# Patient Record
Sex: FEMALE | Race: WHITE | NOT HISPANIC OR LATINO | Employment: OTHER | URBAN - METROPOLITAN AREA
[De-identification: names, ages, dates, MRNs, and addresses within clinical notes are randomized per-mention and may not be internally consistent; named-entity substitution may affect disease eponyms.]

---

## 2017-02-12 ENCOUNTER — TELEPHONE (OUTPATIENT)
Dept: URGENT CARE | Facility: URGENT CARE | Age: 72
End: 2017-02-12

## 2017-02-12 DIAGNOSIS — Z12.11 SPECIAL SCREENING FOR MALIGNANT NEOPLASMS, COLON: Primary | ICD-10-CM

## 2017-02-12 NOTE — TELEPHONE ENCOUNTER
Due for mammogram (please help her schedule) and fit test (mail to her.)     I believe we have consent to communicate with Jinny Flores please let her know.     Joel Wegener,MD

## 2017-02-21 ENCOUNTER — CARE COORDINATION (OUTPATIENT)
Dept: GERIATRIC MEDICINE | Facility: CLINIC | Age: 72
End: 2017-02-21

## 2017-02-21 NOTE — PROGRESS NOTES
Dr. Wegener,       KEERTHI:  I just found out today that Ban Petersen was admitted to [facility] on 02/18 with influenza A. I spoke with her nurse and Ban is planning on discharging home later today.     Hillary Romano RN  [facility]   [phone]

## 2017-02-22 ENCOUNTER — CARE COORDINATION (OUTPATIENT)
Dept: GERIATRIC MEDICINE | Facility: CLINIC | Age: 72
End: 2017-02-22

## 2017-02-22 NOTE — PROGRESS NOTES
Client was discharged home from the hospital on 02/21/17 with PCA services. CM called client's brother. They do not have a follow up appointment scheduled.   Hillary Romano RN  [facility]   [phone]

## 2017-02-23 ENCOUNTER — TELEPHONE (OUTPATIENT)
Dept: FAMILY MEDICINE | Facility: CLINIC | Age: 72
End: 2017-02-23

## 2017-02-23 NOTE — TELEPHONE ENCOUNTER
See 2/22/17 care coordination encounter.    Triage to contact patient for hospital follow up.    Writer unable to locate discharge summary from hospital.    Per 10/5/16 telephone encounter, it appears patient is non-verbal.    Called patient's home number and her brother, Pavel, answered phone.    Per Pavel:  1. Patient is doing okay, but they are happy with her progress  2. Some chest congestion  3. When patient's caretaker, Sandra, gets there today, will call clinic to schedule hospital follow up appt    Reviewed clinic address and phone number with Pavel.    Informed Pavel anyone who answers clinic phone can schedule hospital follow up appt for patient.    Pavel verbalized understanding and in agreement with plan.      BONITA TorresN, RN

## 2017-02-25 PROCEDURE — G0180 MD CERTIFICATION HHA PATIENT: HCPCS | Performed by: FAMILY MEDICINE

## 2017-03-02 ENCOUNTER — TELEPHONE (OUTPATIENT)
Dept: FAMILY MEDICINE | Facility: CLINIC | Age: 72
End: 2017-03-02

## 2017-03-02 NOTE — TELEPHONE ENCOUNTER
Reason for Call:  Other FYI    Detailed comments: Toño, from [facility] would like to let Dr. Wegener know that Ban is getting physical therapy done 2 times a week for 2 weeks. If you have any questions please call him at [phone], thank you!      Phone Number Patient can be reached at: Home number on file [phone] (home)    Best Time: Anytime    Can we leave a detailed message on this number? Not Applicable    Call taken on 3/2/2017 at 1:18 PM by Ashely Smith

## 2017-04-03 ENCOUNTER — TELEPHONE (OUTPATIENT)
Dept: FAMILY MEDICINE | Facility: CLINIC | Age: 72
End: 2017-04-03

## 2017-04-03 NOTE — TELEPHONE ENCOUNTER
Reason for Call:  Other Update    Detailed comments: Shital jaquez is Kvng's OT with [facility] and wanted to inform Dr Wegener of a recent fall that Kvng had on 3/31 - indicated that her right knee is quite sore and red and that on a scale from 1-10; she says its at a 2. If Dr wegener has some concerns regarding any future visits or about kvng in general please give Shital a call back.    Phone Number Patient can be reached at: Other phone number:  [phone]    Best Time: anytime    Can we leave a detailed message on this number? YES    Call taken on 4/3/2017 at 10:10 AM by Teja Massey

## 2017-04-03 NOTE — TELEPHONE ENCOUNTER
In general Ban is well cared for by family.  I presume this was just a notification and if Shital Adams feels more evaluation needed would have told us.      Joel Wegener,MD

## 2017-04-03 NOTE — TELEPHONE ENCOUNTER
Dr. Wegener do you have any concerns of her recent fall?...Please address if need any. Thanks.    Mabel Roca MA

## 2017-05-04 ENCOUNTER — CARE COORDINATION (OUTPATIENT)
Dept: GERIATRIC MEDICINE | Facility: CLINIC | Age: 72
End: 2017-05-04

## 2017-05-04 NOTE — PROGRESS NOTES
Dr. Wegener,     I was notified today that Ban was admitted to [facility] on 5/2/17 with UTI. I left a message today with the nurse to notify me with discharge plans.  Hillary Romano RN  [facility]   [phone]

## 2017-05-08 ENCOUNTER — CARE COORDINATION (OUTPATIENT)
Dept: GERIATRIC MEDICINE | Facility: CLINIC | Age: 72
End: 2017-05-08

## 2017-05-08 NOTE — PROGRESS NOTES
Per Agustín, arranged STS thru [facility] (phone: [phone]) for the below appt:  Appt Date: 5/09/17 @ 3:00 pm  Clinic Name:  [facility]   time:  2:00 pm    Notified CM and family of  time.      Caty Whitt  Case Management Specialist  [facility]   [phone]

## 2017-05-08 NOTE — PROGRESS NOTES
CM spoke with client's brother today. He stated client is home from the hospital and is doing fair. CM informed client's brother of the appointment she has to see her PCP on 5/9/17. He was not aware of the appointment. He asked if CM could help arrange transportation for the appointment. CM will have CMS contact transportation company to see if they will be able to transport client.   Hillary Romano RN  [facility]   [phone]

## 2017-05-09 ENCOUNTER — OFFICE VISIT (OUTPATIENT)
Dept: FAMILY MEDICINE | Facility: CLINIC | Age: 72
End: 2017-05-09
Payer: MEDICARE

## 2017-05-09 VITALS
TEMPERATURE: 96.3 F | DIASTOLIC BLOOD PRESSURE: 78 MMHG | SYSTOLIC BLOOD PRESSURE: 130 MMHG | OXYGEN SATURATION: 94 % | HEART RATE: 79 BPM

## 2017-05-09 DIAGNOSIS — M47.816 SPONDYLOSIS OF LUMBAR REGION WITHOUT MYELOPATHY OR RADICULOPATHY: ICD-10-CM

## 2017-05-09 DIAGNOSIS — K04.7 TOOTH ABSCESS: Primary | ICD-10-CM

## 2017-05-09 DIAGNOSIS — F40.298 SPECIFIC PHOBIA: ICD-10-CM

## 2017-05-09 DIAGNOSIS — H35.30 MACULAR DEGENERATION (SENILE) OF RETINA: ICD-10-CM

## 2017-05-09 PROCEDURE — 99495 TRANSJ CARE MGMT MOD F2F 14D: CPT | Performed by: FAMILY MEDICINE

## 2017-05-09 RX ORDER — LORAZEPAM 0.5 MG/1
TABLET ORAL
Qty: 30 TABLET | Refills: 1 | Status: SHIPPED | OUTPATIENT
Start: 2017-05-09 | End: 2017-09-26

## 2017-05-09 RX ORDER — ACETAMINOPHEN AND CODEINE PHOSPHATE 300; 30 MG/1; MG/1
TABLET ORAL
Qty: 150 TABLET | Refills: 5 | Status: SHIPPED | OUTPATIENT
Start: 2017-05-09 | End: 2017-09-26
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
## 2017-05-09 NOTE — NURSING NOTE
"Chief Complaint   Patient presents with     Recheck Medication     Epilepsy     [facility] F/U       Initial /78 (BP Location: Left arm, Patient Position: Chair, Cuff Size: Adult Regular)  Pulse 79  Temp 96.3  F (35.7  C) (Tympanic)  SpO2 94% Estimated body mass index is 26.63 kg/(m^2) as calculated from the following:    Height as of 12/28/12: 5' 5\" (1.651 m).    Weight as of 8/5/15: 160 lb (72.6 kg).     Pt was not weight and height check in, pt was in wheel chair    Medication Reconciliation: complete     Doreen Babb MA    "

## 2017-05-09 NOTE — PROGRESS NOTES
[facility] Six-Month Telephone Assessment    6 month telephone assessment completed on  05/08/2017. CM spoke with client's marilyn Flores for the assessment. She stated was aware of client's appointment with PCP on 5/9 and was planning on going with client.     ER visits: Yes -  [facility]  Hospitalizations: Yes -  [facility]  TCU stays: No  Significant health status changes: Client is a little weaker. She was just discharged from the hospital.   Falls/Injuries: Yes: No injuries  ADL/IADL changes: No  Changes in services: No    Caregiver Assessment follow up:  N/A    Goals: See POC in chart for goal progress documentation.    Hillary Romano RN  [facility]   [phone]      Will see client in 6 months for an annual health risk assessment.   Encouraged client to call CM with any questions or concerns in the meantime.

## 2017-05-26 DIAGNOSIS — Z53.9 DIAGNOSIS NOT YET DEFINED: Primary | ICD-10-CM

## 2017-05-26 PROCEDURE — G0180 MD CERTIFICATION HHA PATIENT: HCPCS | Performed by: FAMILY MEDICINE

## 2017-08-16 DIAGNOSIS — G89.4 CHRONIC PAIN SYNDROME: ICD-10-CM

## 2017-08-16 NOTE — TELEPHONE ENCOUNTER
Medication Detail      Disp Refills Start End RAAD   gabapentin (NEURONTIN) 400 MG capsule 180 capsule 3 6/20/2016  No   Sig: Take 1 capsule (400 mg) by mouth 2 times daily       Last Office Visit with [facility] or [facility] prescribing provider: 5/9/17  Future Office visit:       Routing refill request to provider for review/approval because:  Drug not on the [facility], [facility] or [facility] refill protocol or controlled substance

## 2017-08-16 NOTE — TELEPHONE ENCOUNTER
Problem List Complete:  No     PROVIDER TO CONSIDER COMPLETION OF PROBLEM LIST AND OVERVIEW/CONTROLLED SUBSTANCE AGREEMENT    Controlled substance agreement on file: Yes:  Date 8/6/2015.     Processing:  called, faxed, or e-scribed     checked in past 6 months?  No, route to RN - chronic pain - [place]  review 8/16/2017 12:54 PM - no concerns  Last refill 3/30/2017      Thank you,  Patrizia Chavarria RN

## 2017-08-20 RX ORDER — GABAPENTIN 400 MG/1
CAPSULE ORAL
Qty: 180 CAPSULE | Refills: 3 | Status: SHIPPED | OUTPATIENT
Start: 2017-08-20 | End: 2018-04-10

## 2017-08-22 ENCOUNTER — TELEPHONE (OUTPATIENT)
Dept: FAMILY MEDICINE | Facility: CLINIC | Age: 72
End: 2017-08-22

## 2017-08-22 NOTE — LETTER
[facility]   [address] [place] [address]  [phone]    August 22, 2017      Ban Petersen  [address] [place] [address]              [facility] is committed to making sure our patients get the best care, including preventative care.  Part of that commitment includes making sure you are getting your screening tests, like mammograms, colonoscopy, and pap smears on time.  The [facility]  team has noted that you are currently overdue for your mammogram.      If you think you have already had a mammogram in the past 2 years, please let us know so that we can update our records by giving us information such as when and where you had it done and if the results were normal.  You can send us a message on [facility] or call the clinic at [phone] to give us an update.     If you have not had a mammogram, you can call and schedule one at either:      Central Scheduling [phone]  [facility] offering Sliding-scale or free mammograms for the under/uninsured: [phone]     You may have already discussed this with your provider but [facility] has an additional healthcare team specifically designed to help you remember to complete your regular screening tests.  We appologize in advance for any duplicate messages you may receive; we hope you understand that our primary goal is your health.    Your Healthcare Team

## 2017-09-19 ENCOUNTER — CARE COORDINATION (OUTPATIENT)
Dept: GERIATRIC MEDICINE | Facility: CLINIC | Age: 72
End: 2017-09-19

## 2017-09-19 NOTE — PROGRESS NOTES
Call placed to client to schedule a home visit appointment to complete the annual HRA.  A home visit has been scheduled for 09/27/2017 at 3:00.    Hillary Romano RN  [facility]   [phone]

## 2017-09-26 ENCOUNTER — OFFICE VISIT (OUTPATIENT)
Dept: FAMILY MEDICINE | Facility: CLINIC | Age: 72
End: 2017-09-26
Payer: MEDICARE

## 2017-09-26 VITALS
HEART RATE: 74 BPM | SYSTOLIC BLOOD PRESSURE: 102 MMHG | TEMPERATURE: 95.2 F | RESPIRATION RATE: 14 BRPM | DIASTOLIC BLOOD PRESSURE: 68 MMHG

## 2017-09-26 DIAGNOSIS — Z23 NEED FOR PROPHYLACTIC VACCINATION AND INOCULATION AGAINST INFLUENZA: ICD-10-CM

## 2017-09-26 DIAGNOSIS — Z13.6 CARDIOVASCULAR SCREENING; LDL GOAL LESS THAN 160: ICD-10-CM

## 2017-09-26 DIAGNOSIS — M47.816 SPONDYLOSIS OF LUMBAR REGION WITHOUT MYELOPATHY OR RADICULOPATHY: ICD-10-CM

## 2017-09-26 DIAGNOSIS — G89.4 CHRONIC PAIN SYNDROME: ICD-10-CM

## 2017-09-26 DIAGNOSIS — S06.9X6S: Primary | ICD-10-CM

## 2017-09-26 DIAGNOSIS — R13.12 OROPHARYNGEAL DYSPHAGIA: ICD-10-CM

## 2017-09-26 DIAGNOSIS — F40.298 SPECIFIC PHOBIA: ICD-10-CM

## 2017-09-26 DIAGNOSIS — H35.30 MACULAR DEGENERATION (SENILE) OF RETINA: ICD-10-CM

## 2017-09-26 DIAGNOSIS — G81.90 HEMIPARESIS (H): ICD-10-CM

## 2017-09-26 DIAGNOSIS — K58.2 IRRITABLE BOWEL SYNDROME WITH BOTH CONSTIPATION AND DIARRHEA: ICD-10-CM

## 2017-09-26 DIAGNOSIS — G40.822 NONINTRACTABLE EPILEPTIC SPASMS WITHOUT STATUS EPILEPTICUS (H): ICD-10-CM

## 2017-09-26 DIAGNOSIS — F09 COGNITIVE DYSFUNCTION: ICD-10-CM

## 2017-09-26 DIAGNOSIS — R47.01 APHASIA: ICD-10-CM

## 2017-09-26 LAB
ERYTHROCYTE [DISTWIDTH] IN BLOOD BY AUTOMATED COUNT: 12.7 % (ref 10–15)
HCT VFR BLD AUTO: 41.9 % (ref 35–47)
HGB BLD-MCNC: 13.7 G/DL (ref 11.7–15.7)
MCH RBC QN AUTO: 32.5 PG (ref 26.5–33)
MCHC RBC AUTO-ENTMCNC: 32.7 G/DL (ref 31.5–36.5)
MCV RBC AUTO: 100 FL (ref 78–100)
PLATELET # BLD AUTO: 229 10E9/L (ref 150–450)
RBC # BLD AUTO: 4.21 10E12/L (ref 3.8–5.2)
WBC # BLD AUTO: 6.9 10E9/L (ref 4–11)

## 2017-09-26 PROCEDURE — 85027 COMPLETE CBC AUTOMATED: CPT | Performed by: FAMILY MEDICINE

## 2017-09-26 PROCEDURE — 80185 ASSAY OF PHENYTOIN TOTAL: CPT | Performed by: FAMILY MEDICINE

## 2017-09-26 PROCEDURE — 90662 IIV NO PRSV INCREASED AG IM: CPT | Performed by: FAMILY MEDICINE

## 2017-09-26 PROCEDURE — 80061 LIPID PANEL: CPT | Performed by: FAMILY MEDICINE

## 2017-09-26 PROCEDURE — 99215 OFFICE O/P EST HI 40 MIN: CPT | Mod: 25 | Performed by: FAMILY MEDICINE

## 2017-09-26 PROCEDURE — 80053 COMPREHEN METABOLIC PANEL: CPT | Performed by: FAMILY MEDICINE

## 2017-09-26 PROCEDURE — 36415 COLL VENOUS BLD VENIPUNCTURE: CPT | Performed by: FAMILY MEDICINE

## 2017-09-26 PROCEDURE — G0008 ADMIN INFLUENZA VIRUS VAC: HCPCS | Performed by: FAMILY MEDICINE

## 2017-09-26 RX ORDER — ACETAMINOPHEN AND CODEINE PHOSPHATE 300; 30 MG/1; MG/1
TABLET ORAL
Qty: 150 TABLET | Refills: 5 | Status: SHIPPED | OUTPATIENT
Start: 2017-09-26 | End: 2018-04-10

## 2017-09-26 RX ORDER — LORAZEPAM 0.5 MG/1
TABLET ORAL
Qty: 30 TABLET | Refills: 1 | Status: SHIPPED | OUTPATIENT
Start: 2017-09-26 | End: 2018-04-10

## 2017-09-26 ASSESSMENT — PATIENT HEALTH QUESTIONNAIRE - PHQ9: SUM OF ALL RESPONSES TO PHQ QUESTIONS 1-9: 0

## 2017-09-26 NOTE — LETTER
September 28, 2017      Ban [place]  [address]  [place] [facility] [place] [facility] [address]        Dear ,    We are writing to inform you of your test results.    The phenytoin level was on the low side now so it looks safe to re-start the extra 30mg dose as well.  Please let me know if you need another prescription    Resulted Orders   CBC with platelets   Result Value Ref Range    WBC 6.9 4.0 - 11.0 10e9/L    RBC Count 4.21 3.8 - 5.2 10e12/L    Hemoglobin 13.7 11.7 - 15.7 g/dL    Hematocrit 41.9 35.0 - 47.0 %     78 - 100 fl    MCH 32.5 26.5 - 33.0 pg    MCHC 32.7 31.5 - 36.5 g/dL    RDW 12.7 10.0 - 15.0 %    Platelet Count 229 150 - 450 10e9/L   Phenytoin level   Result Value Ref Range    Phenytoin Level 10.1 10 - 20 mg/L       If you have any questions or concerns, please call the clinic at the number listed above.     Sincerely,    Joel Daniel Wegener, MD/nr

## 2017-09-26 NOTE — NURSING NOTE
Ban Petersen      1.  Has the patient received the information for the influenza vaccine? YES    2.  Does the patient have any of the following contraindications?     Allergy to eggs? No     Allergic reaction to previous influenza vaccines? No     Any other problems to previous influenza vaccines? No     Paralyzed by Guillain-[place] syndrome? No     Currently pregnant? NO     Current moderate or severe illness? No     Allergy to contact lens solution? No    3.  The vaccine has been administered in the usual fashion and the patient was instructed to wait 20 minutes before leaving the building in the event of an allergic reaction: YES    Vaccination given by Doreen Babb MA  .  Recorded by Doreen Babb
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
## 2017-09-26 NOTE — NURSING NOTE
"Chief Complaint   Patient presents with     Recheck Medication     Forms     Annual Comprehensive Nursing Home       Initial /68 (BP Location: Left arm, Patient Position: Chair, Cuff Size: Adult Regular)  Pulse 74  Temp 95.2  F (35.1  C) (Tympanic)  Resp 14 Estimated body mass index is 26.63 kg/(m^2) as calculated from the following:    Height as of 12/28/12: 5' 5\" (1.651 m).    Weight as of 8/5/15: 160 lb (72.6 kg).  Medication Reconciliation: complete Doreen Babb MA      "
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
## 2017-09-26 NOTE — LETTER
September 29, 2017      Ban Petersen  [address]  [facility] [address]        Dear ,    We are writing to inform you of your test results.    The 10-year ASCVD risk score (Jim FRYE Jr, et al., 2013) is: 7.2%    Values used to calculate the score:      Age: 72 years      Sex: Female      Is Non- : No      Diabetic: No      Tobacco smoker: No      Systolic Blood Pressure: 102 mmHg      Is BP treated: No      HDL Cholesterol: 54 mg/dL      Total Cholesterol: 234 mg/dL    Wen's cholesterol score was good (see above).  The liver/kidney labs were normal.    Resulted Orders   Comprehensive metabolic panel   Result Value Ref Range    Sodium 143 133 - 144 mmol/L    Potassium 4.6 3.4 - 5.3 mmol/L    Chloride 110 (H) 94 - 109 mmol/L    Carbon Dioxide 22 20 - 32 mmol/L    Anion Gap 11 3 - 14 mmol/L    Glucose 75 70 - 99 mg/dL      Comment:      Fasting specimen    Urea Nitrogen 18 7 - 30 mg/dL    Creatinine 0.60 0.52 - 1.04 mg/dL    GFR Estimate >90 >60 mL/min/1.7m2      Comment:      Non  GFR Calc    GFR Estimate If Black >90 >60 mL/min/1.7m2      Comment:       GFR Calc    Calcium 9.6 8.5 - 10.1 mg/dL    Bilirubin Total 0.2 0.2 - 1.3 mg/dL    Albumin 3.7 3.4 - 5.0 g/dL    Protein Total 7.3 6.8 - 8.8 g/dL    Alkaline Phosphatase 85 40 - 150 U/L    ALT 19 0 - 50 U/L    AST 17 0 - 45 U/L   Lipid panel reflex to direct LDL   Result Value Ref Range    Cholesterol 234 (H) <200 mg/dL      Comment:      Desirable:       <200 mg/dl    Triglycerides 248 (H) <150 mg/dL      Comment:      Borderline high:  150-199 mg/dl  High:             200-499 mg/dl  Very high:       >499 mg/dl  Fasting specimen      HDL Cholesterol 54 >49 mg/dL    LDL Cholesterol Calculated 130 (H) <100 mg/dL      Comment:      Above desirable:  100-129 mg/dl  Borderline High:  130-159 mg/dL  High:             160-189 mg/dL  Very high:       >189 mg/dl      Non HDL Cholesterol 180 (H)  <130 mg/dL      Comment:      Above Desirable:  130-159 mg/dl  Borderline high:  160-189 mg/dl  High:             190-219 mg/dl  Very high:       >219 mg/dl     CBC with platelets   Result Value Ref Range    WBC 6.9 4.0 - 11.0 10e9/L    RBC Count 4.21 3.8 - 5.2 10e12/L    Hemoglobin 13.7 11.7 - 15.7 g/dL    Hematocrit 41.9 35.0 - 47.0 %     78 - 100 fl    MCH 32.5 26.5 - 33.0 pg    MCHC 32.7 31.5 - 36.5 g/dL    RDW 12.7 10.0 - 15.0 %    Platelet Count 229 150 - 450 10e9/L   Phenytoin level   Result Value Ref Range    Phenytoin Level 10.1 10 - 20 mg/L   If you have any questions or concerns, please call the clinic at the number listed above.   Sincerely,  Joel Daniel Wegener, MD/nr
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
## 2017-09-27 ENCOUNTER — CARE COORDINATION (OUTPATIENT)
Dept: GERIATRIC MEDICINE | Facility: CLINIC | Age: 72
End: 2017-09-27

## 2017-09-27 LAB — PHENYTOIN SERPL-MCNC: 10.1 MG/L (ref 10–20)

## 2017-09-28 ENCOUNTER — CARE COORDINATION (OUTPATIENT)
Dept: GERIATRIC MEDICINE | Facility: CLINIC | Age: 72
End: 2017-09-28

## 2017-09-28 LAB
ALBUMIN SERPL-MCNC: 3.7 G/DL (ref 3.4–5)
ALP SERPL-CCNC: 85 U/L (ref 40–150)
ALT SERPL W P-5'-P-CCNC: 19 U/L (ref 0–50)
ANION GAP SERPL CALCULATED.3IONS-SCNC: 11 MMOL/L (ref 3–14)
AST SERPL W P-5'-P-CCNC: 17 U/L (ref 0–45)
BILIRUB SERPL-MCNC: 0.2 MG/DL (ref 0.2–1.3)
BUN SERPL-MCNC: 18 MG/DL (ref 7–30)
CALCIUM SERPL-MCNC: 9.6 MG/DL (ref 8.5–10.1)
CHLORIDE SERPL-SCNC: 110 MMOL/L (ref 94–109)
CHOLEST SERPL-MCNC: 234 MG/DL
CO2 SERPL-SCNC: 22 MMOL/L (ref 20–32)
CREAT SERPL-MCNC: 0.6 MG/DL (ref 0.52–1.04)
GFR SERPL CREATININE-BSD FRML MDRD: >90 ML/MIN/1.7M2
GLUCOSE SERPL-MCNC: 75 MG/DL (ref 70–99)
HDLC SERPL-MCNC: 54 MG/DL
LDLC SERPL CALC-MCNC: 130 MG/DL
NONHDLC SERPL-MCNC: 180 MG/DL
POTASSIUM SERPL-SCNC: 4.6 MMOL/L (ref 3.4–5.3)
PROT SERPL-MCNC: 7.3 G/DL (ref 6.8–8.8)
SODIUM SERPL-SCNC: 143 MMOL/L (ref 133–144)
TRIGL SERPL-MCNC: 248 MG/DL

## 2017-09-28 NOTE — PROGRESS NOTES
Per CC, notified [employer] to increase monthly pad order.  Melita Lange  Case Management Specialist  [facility]  [phone]

## 2017-09-28 NOTE — PROGRESS NOTES
Per Carli at [facility] member's order hasn't shipped since May.  Jinny has not been approving orders.  CC notified.  Carli will ship the 120 pads authorized.  Melita Lange  Case Management Specialist  [facility]  [phone]

## 2017-10-09 ENCOUNTER — CARE COORDINATION (OUTPATIENT)
Dept: GERIATRIC MEDICINE | Facility: CLINIC | Age: 72
End: 2017-10-09

## 2017-10-09 NOTE — PROGRESS NOTES
Received after visit chart from care coordinator.  Completed following tasks: Mailed copy of care plan to client  Updated services in access  [facility]:  Emailed completed PCA assessment to [facility].  Faxed copy of PCA assessment to [facility] Agency and mailed copy to member.  Faxed MD Communication to PCP.   Chart was returned to CM.   Melita Lange  Case Management Specialist  [facility]  [phone]

## 2017-10-19 ENCOUNTER — TELEPHONE (OUTPATIENT)
Dept: FAMILY MEDICINE | Facility: CLINIC | Age: 72
End: 2017-10-19
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
## 2017-10-19 NOTE — TELEPHONE ENCOUNTER
Letter pended.    Consent to communicate with Sandra on file.    Dr. Wegener-Please review and sign if agree.    Thank you!  BONITA TorresN, RN
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
## 2017-10-20 NOTE — TELEPHONE ENCOUNTER
Lm on  letter caller know letter is done and can be picked up at the  and to make sure has photo ID when picking up

## 2017-11-08 DIAGNOSIS — G40.909 SEIZURE DISORDER (H): ICD-10-CM

## 2017-11-10 RX ORDER — LEVETIRACETAM 750 MG/1
TABLET, FILM COATED, EXTENDED RELEASE ORAL
Qty: 90 TABLET | Refills: 0 | Status: SHIPPED | OUTPATIENT
Start: 2017-11-10 | End: 2018-03-02

## 2017-11-21 ENCOUNTER — CARE COORDINATION (OUTPATIENT)
Dept: GERIATRIC MEDICINE | Facility: CLINIC | Age: 72
End: 2017-11-21

## 2017-11-21 NOTE — PROGRESS NOTES
CM received notification that member had health plan product change from [employer] to [employer] effective 11/01/17.  CM contacted client and her niece Sandra and discussed product change and face to face visit was offered. Client and niece declined need for home visit. CM reviewed Health Risk Assessment/LTCC and POC with client and no changes noted. CMS instructed to enter product change in MMIS.    Follow up Plan: CM to f/u with client at either annual HRA visit and/or 6 month telephone assessment.  Contact information shared with client and family, and encouraged client to call with any questions or concerns.   Hillary Romano RN  [facility]   [phone]

## 2017-11-28 ENCOUNTER — CARE COORDINATION (OUTPATIENT)
Dept: GERIATRIC MEDICINE | Facility: CLINIC | Age: 72
End: 2017-11-28

## 2017-11-28 DIAGNOSIS — G40.909 SEIZURE DISORDER (H): ICD-10-CM

## 2017-11-28 RX ORDER — PHENYTOIN SODIUM 200 MG/1
200 CAPSULE, EXTENDED RELEASE ORAL DAILY
Qty: 90 CAPSULE | Refills: 0 | Status: SHIPPED | OUTPATIENT
Start: 2017-11-28 | End: 2018-03-26

## 2017-11-28 NOTE — PROGRESS NOTES
Per CC request, CMS checked with [employer] on pad order.  Per Yuliet, member hasn't received an order since September.  Yuliet would get an order started and reach out to niece to confirm.  Will contact CC or myself if issue getting in touch with niece.  CC notified.  Melita Lange  Case Management Specialist  [facility]  [phone]

## 2017-11-28 NOTE — TELEPHONE ENCOUNTER
Reason for Call:  Medication or medication refill:    Do you use a [facility]?  Name of the pharmacy and phone number for the current request:  Pharmacy: [facility] [id number] - [address] AT [address] [Patient Preferred]  [phone]    Name of the medication requested: Phenytoin Sodium Extended 200 MG CAPS    Other request: pt's niece is calling to refill this med-has been w/o for over a week. Must have been miscommunication with pharmacy. Can this get filled ASAP?    Can we leave a detailed message on this number? YES    Phone number patient can be reached at: Cell number on file:    Telephone Information:   Mobile [phone]       Best Time: anytime    Call taken on 11/28/2017 at 3:48 PM by Belen Kevin

## 2017-12-12 ENCOUNTER — CARE COORDINATION (OUTPATIENT)
Dept: GERIATRIC MEDICINE | Facility: CLINIC | Age: 72
End: 2017-12-12

## 2017-12-12 NOTE — PROGRESS NOTES
CM received a call from Jeffrey at [facility]  ([phone]) regarding client's bill for charges at [facility]. CM called [facility] and spoke with Neris ([phone]) and Brenda regarding the bill and was told the bill should have been paid because it was covered under client's health plan. They were going to mail out an appeal form to client to fill out so the claim would be filed again. CATRACHITO called client's family and notified them that they should be receiving a letter from [facility] soon regarding this and requested they fill out the form for [facility].   Hillary Romano RN  [facility]   [phone]

## 2018-03-02 DIAGNOSIS — G40.909 SEIZURE DISORDER (H): ICD-10-CM

## 2018-03-02 NOTE — TELEPHONE ENCOUNTER
"Requested Prescriptions   Pending Prescriptions Disp Refills     levETIRAcetam (KEPPRA XR) 750 MG 24 hr tablet [Pharmacy Med Name: LEVETIRACETAM ER 750MG TABLETS]  Last Written Prescription Date:  11/10/2017  Last Fill Quantity: 90 tabs,  # refills: 0   Last office visit: 9/26/2017 with prescribing provider:  wegener   Future Office Visit:     90 tablet 0     Sig: TAKE 1 TABLET BY MOUTH DAILY    Anticonvulsants Protocol  Failed    3/2/2018  1:38 PM       Failed - Review Authorizing provider's last note.     Refer to last progress notes: confirm request is for original authorizing provider (cannot be through other providers).         Passed - No active pregnancy on record       Passed - No positive pregnancy test in last 12 months       Passed - Recent (6 mo) or future (30 days) visit within the authorizing provider's specialty    Patient had office visit in the last 6 months or has a visit in the next 30 days with authorizing provider.  See \"Patient Info\" tab in inbasket, or \"Choose Columns\" in Meds & Orders section of the refill encounter.              "

## 2018-03-02 NOTE — TELEPHONE ENCOUNTER
Level not done for keppra.-future lab still in chart  Last office visit 9/17  Please advise based on last not if ok to fill.     Thanks!     Miriam Hou RN

## 2018-03-05 RX ORDER — LEVETIRACETAM 750 MG/1
TABLET, FILM COATED, EXTENDED RELEASE ORAL
Qty: 90 TABLET | Refills: 1 | Status: SHIPPED | OUTPATIENT
Start: 2018-03-05 | End: 2019-09-03

## 2018-03-13 ENCOUNTER — CARE COORDINATION (OUTPATIENT)
Dept: GERIATRIC MEDICINE | Facility: CLINIC | Age: 73
End: 2018-03-13

## 2018-03-13 NOTE — TELEPHONE ENCOUNTER
Spoke to male person at patients home number and he said that we need to give information to person that takes care of all of this so he said to call back after 3:30 to speak with Sandra Rivero she is only there on Tuesday's, Wednesday's, and Thursday's after 3:30 and to let her know about medication being filled and that patient needs to come in and have labs done to have keppra level checked

## 2018-03-14 ENCOUNTER — TELEPHONE (OUTPATIENT)
Dept: FAMILY MEDICINE | Facility: CLINIC | Age: 73
End: 2018-03-14

## 2018-03-14 NOTE — PROGRESS NOTES
[facility] Six-Month Telephone Assessment    6 month telephone assessment completed on 03/13/18. CM spoke with client's marilyn Flores.    ER visits: No  Hospitalizations: No  TCU stays: No  Significant health status changes: Client is having more problems with incontinence. She needs larger pads. Sandra is working with [employer] to get these.   Falls/Injuries: No  ADL/IADL changes: No  Changes in services: No    Caregiver Assessment follow up:  N/A    Goals: See POC in chart for goal progress documentation.  Sandra states client was measured for a wheelchair by [employer] almost a year ago and they still haven't received the wheelchair. She states they are working on getting paperwork signed by PCP. CM called [employer] today and left a message with Vazquez at ext [phone] requesting a call back regarding this and to see if CM could assist with getting the wheelchair for client.     Will see member in 6 months for an annual health risk assessment.   Encouraged member to call CC with any questions or concerns in the meantime.   Hillary Romano RN  [facility]   [phone]

## 2018-03-14 NOTE — TELEPHONE ENCOUNTER
Reason for Call:  Other- new wheel chair from [facility] & changed order for bladder control supplies from [employer]    Detailed comments: Sandra states that they have been trying to get a new wheelchair for pt since last OV (4-5 months ago). [facility] has faxed over forms for Dr. Wegener to fill out but has not heard back. Please follow up. Thank you!    [facility] will be faxing orders for bladder control supplies also.     Phone Number Patient can be reached at: Home number on file [phone] (home)    Best Time: anytime    Can we leave a detailed message on this number? Not Applicable    Call taken on 3/14/2018 at 1:07 PM by Ashely Smith

## 2018-03-26 DIAGNOSIS — G40.909 SEIZURE DISORDER (H): ICD-10-CM

## 2018-03-26 DIAGNOSIS — Z51.81 MEDICATION MONITORING ENCOUNTER: Primary | ICD-10-CM

## 2018-03-26 NOTE — TELEPHONE ENCOUNTER
Reason for Call:  Medication or medication refill:    Do you use a [facility]?  Name of the pharmacy and phone number for the current request:  [facility] on [address] - [phone]    Name of the medication requested: Phenytoin Sodium Extended 200 MG CAPS    Other request: Pt will be out by this Friday.    Can we leave a detailed message on this number? YES    Phone number patient can be reached at: Cell number on file:    Telephone Information:   Mobile [phone]       Best Time: Anytime    Call taken on 3/26/2018 at 1:06 PM by Brittany oByd

## 2018-03-26 NOTE — TELEPHONE ENCOUNTER
"Requested Prescriptions   Pending Prescriptions Disp Refills     Phenytoin Sodium Extended 200 MG CAPS  Last Written Prescription Date:  11/28/2017  Last Fill Quantity: 90 capsule,  # refills: 0   Last Office Visit: 9/26/2017   Future Office Visit:    Next 5 appointments (look out 90 days)     Apr 10, 2018  3:20 PM CDT   Office Visit with Joel Daniel Irwin Wegener, MD   [facility] ([facility])    [address] [place] [id number] [phone]                90 capsule 0     Sig: Take 200 mg by mouth daily    Anticonvulsants Protocol  Failed    3/26/2018  1:17 PM       Failed - Dilantin level within therapeutic range in past 6 months    Recent Labs   Lab Test  09/26/17   1603   DILANTIN  10.1       Dilantin level must be checked 2-4 weeks after dosage change.         Failed - Review Authorizing provider's last note.     Refer to last progress notes: confirm request is for original authorizing provider (cannot be through other providers).       Failed - Normal ALT on file in past 6 months    Recent Labs   Lab Test  09/26/17   1603   ALT  19          Failed - Normal CBC on file in past 6 months    Recent Labs   Lab Test  09/26/17   1603   WBC  6.9   RBC  4.21   HGB  13.7   HCT  41.9   PLT  229          Passed - No active pregnancy on record       Passed - No positive pregnancy test in last 12 months       Passed - Recent (6 mo) or future (30 days) visit within the authorizing provider's specialty    Patient had office visit in the last 6 months or has a visit in the next 30 days with authorizing provider or within the authorizing provider's specialty.  See \"Patient Info\" tab in inbasket, or \"Choose Columns\" in Meds & Orders section of the refill encounter.              "

## 2018-03-29 RX ORDER — PHENYTOIN SODIUM 200 MG/1
200 CAPSULE, EXTENDED RELEASE ORAL DAILY
Qty: 90 CAPSULE | Refills: 0 | Status: SHIPPED | OUTPATIENT
Start: 2018-03-29 | End: 2018-04-10

## 2018-03-29 NOTE — TELEPHONE ENCOUNTER
Routing refill request to provider for review/approval because:  Labs not current:  CBC, ALT and Keppra level    -Writer notes Keppra lab already ordered in chart.  CBC and ALT also pended.    Dr. Wegener-Please sign if agree.    Thank you!  MARAL Sanderson, BSN, RN

## 2018-04-10 ENCOUNTER — OFFICE VISIT (OUTPATIENT)
Dept: FAMILY MEDICINE | Facility: CLINIC | Age: 73
End: 2018-04-10
Payer: COMMERCIAL

## 2018-04-10 VITALS
TEMPERATURE: 97.9 F | DIASTOLIC BLOOD PRESSURE: 72 MMHG | OXYGEN SATURATION: 99 % | SYSTOLIC BLOOD PRESSURE: 120 MMHG | HEART RATE: 69 BPM

## 2018-04-10 DIAGNOSIS — Z23 NEED FOR VACCINATION: ICD-10-CM

## 2018-04-10 DIAGNOSIS — F40.298 SPECIFIC PHOBIA: ICD-10-CM

## 2018-04-10 DIAGNOSIS — G40.909 SEIZURE DISORDER (H): Primary | ICD-10-CM

## 2018-04-10 DIAGNOSIS — M47.816 SPONDYLOSIS OF LUMBAR REGION WITHOUT MYELOPATHY OR RADICULOPATHY: ICD-10-CM

## 2018-04-10 DIAGNOSIS — H35.30 MACULAR DEGENERATION (SENILE) OF RETINA: ICD-10-CM

## 2018-04-10 DIAGNOSIS — G89.4 CHRONIC PAIN SYNDROME: ICD-10-CM

## 2018-04-10 LAB
ERYTHROCYTE [DISTWIDTH] IN BLOOD BY AUTOMATED COUNT: 12.6 % (ref 10–15)
HCT VFR BLD AUTO: 42.6 % (ref 35–47)
HGB BLD-MCNC: 13.8 G/DL (ref 11.7–15.7)
MCH RBC QN AUTO: 32.5 PG (ref 26.5–33)
MCHC RBC AUTO-ENTMCNC: 32.4 G/DL (ref 31.5–36.5)
MCV RBC AUTO: 100 FL (ref 78–100)
PLATELET # BLD AUTO: 210 10E9/L (ref 150–450)
RBC # BLD AUTO: 4.25 10E12/L (ref 3.8–5.2)
WBC # BLD AUTO: 6.1 10E9/L (ref 4–11)

## 2018-04-10 PROCEDURE — 90670 PCV13 VACCINE IM: CPT | Performed by: FAMILY MEDICINE

## 2018-04-10 PROCEDURE — 99000 SPECIMEN HANDLING OFFICE-LAB: CPT | Performed by: FAMILY MEDICINE

## 2018-04-10 PROCEDURE — 80185 ASSAY OF PHENYTOIN TOTAL: CPT | Performed by: FAMILY MEDICINE

## 2018-04-10 PROCEDURE — 36415 COLL VENOUS BLD VENIPUNCTURE: CPT | Performed by: FAMILY MEDICINE

## 2018-04-10 PROCEDURE — 90715 TDAP VACCINE 7 YRS/> IM: CPT | Performed by: FAMILY MEDICINE

## 2018-04-10 PROCEDURE — 80177 DRUG SCRN QUAN LEVETIRACETAM: CPT | Mod: 90 | Performed by: FAMILY MEDICINE

## 2018-04-10 PROCEDURE — 80053 COMPREHEN METABOLIC PANEL: CPT | Performed by: FAMILY MEDICINE

## 2018-04-10 PROCEDURE — G0009 ADMIN PNEUMOCOCCAL VACCINE: HCPCS | Mod: 59 | Performed by: FAMILY MEDICINE

## 2018-04-10 PROCEDURE — 99214 OFFICE O/P EST MOD 30 MIN: CPT | Mod: 25 | Performed by: FAMILY MEDICINE

## 2018-04-10 PROCEDURE — 85027 COMPLETE CBC AUTOMATED: CPT | Performed by: FAMILY MEDICINE

## 2018-04-10 PROCEDURE — 90471 IMMUNIZATION ADMIN: CPT | Performed by: FAMILY MEDICINE

## 2018-04-10 RX ORDER — LORAZEPAM 0.5 MG/1
TABLET ORAL
Qty: 30 TABLET | Refills: 1 | Status: SHIPPED | OUTPATIENT
Start: 2018-04-10 | End: 2018-10-09

## 2018-04-10 RX ORDER — GABAPENTIN 400 MG/1
CAPSULE ORAL
Qty: 180 CAPSULE | Refills: 3 | Status: SHIPPED | OUTPATIENT
Start: 2018-04-10 | End: 2019-05-08

## 2018-04-10 RX ORDER — ACETAMINOPHEN AND CODEINE PHOSPHATE 300; 30 MG/1; MG/1
TABLET ORAL
Qty: 150 TABLET | Refills: 5 | Status: SHIPPED | OUTPATIENT
Start: 2018-04-10 | End: 2018-06-04

## 2018-04-10 RX ORDER — PHENYTOIN SODIUM 200 MG/1
200 CAPSULE, EXTENDED RELEASE ORAL DAILY
Qty: 90 CAPSULE | Refills: 3 | Status: SHIPPED | OUTPATIENT
Start: 2018-04-10 | End: 2019-10-03

## 2018-04-10 ASSESSMENT — ANXIETY QUESTIONNAIRES
7. FEELING AFRAID AS IF SOMETHING AWFUL MIGHT HAPPEN: NOT AT ALL
IF YOU CHECKED OFF ANY PROBLEMS ON THIS QUESTIONNAIRE, HOW DIFFICULT HAVE THESE PROBLEMS MADE IT FOR YOU TO DO YOUR WORK, TAKE CARE OF THINGS AT HOME, OR GET ALONG WITH OTHER PEOPLE: NOT DIFFICULT AT ALL
1. FEELING NERVOUS, ANXIOUS, OR ON EDGE: NOT AT ALL
3. WORRYING TOO MUCH ABOUT DIFFERENT THINGS: NOT AT ALL
2. NOT BEING ABLE TO STOP OR CONTROL WORRYING: NOT AT ALL
6. BECOMING EASILY ANNOYED OR IRRITABLE: NOT AT ALL

## 2018-04-10 ASSESSMENT — PATIENT HEALTH QUESTIONNAIRE - PHQ9: 5. POOR APPETITE OR OVEREATING: NOT AT ALL

## 2018-04-10 NOTE — LETTER
April 16, 2018      Ban Petersen  [address]  [facility] [place] [facility] [address]        Dear ,    We are writing to inform you of your test results.    Results stable/normal, no further med changes needed.    Resulted Orders   Comprehensive metabolic panel   Result Value Ref Range    Sodium 143 133 - 144 mmol/L    Potassium 4.9 3.4 - 5.3 mmol/L    Chloride 112 (H) 94 - 109 mmol/L    Carbon Dioxide 22 20 - 32 mmol/L    Anion Gap 9 3 - 14 mmol/L    Glucose 84 70 - 99 mg/dL      Comment:      Non Fasting    Urea Nitrogen 19 7 - 30 mg/dL    Creatinine 0.71 0.52 - 1.04 mg/dL    GFR Estimate 81 >60 mL/min/1.7m2      Comment:      Non  GFR Calc    GFR Estimate If Black >90 >60 mL/min/1.7m2      Comment:       GFR Calc    Calcium 9.7 8.5 - 10.1 mg/dL    Bilirubin Total 0.2 0.2 - 1.3 mg/dL    Albumin 3.8 3.4 - 5.0 g/dL    Protein Total 7.5 6.8 - 8.8 g/dL    Alkaline Phosphatase 83 40 - 150 U/L    ALT 12 0 - 50 U/L    AST 15 0 - 45 U/L   CBC with platelets   Result Value Ref Range    WBC 6.1 4.0 - 11.0 10e9/L    RBC Count 4.25 3.8 - 5.2 10e12/L    Hemoglobin 13.8 11.7 - 15.7 g/dL    Hematocrit 42.6 35.0 - 47.0 %     78 - 100 fl    MCH 32.5 26.5 - 33.0 pg    MCHC 32.4 31.5 - 36.5 g/dL    RDW 12.6 10.0 - 15.0 %    Platelet Count 210 150 - 450 10e9/L   Phenytoin level   Result Value Ref Range    Phenytoin Level 10.4 10 - 20 mg/L   Keppra (Levetiracetam) Level   Result Value Ref Range    Keppra (Levetiracetam) Level 12 12 - 46 ug/mL      Comment:      (Note)  INTERPRETIVE INFORMATION: Keppra (Levetiracetam)  Therapeutic Range:  12-46 ug/mL             Toxic:  Not well Established  Pharmacokinetics of levetiracetam are affected by renal   function. Adverse effects may include somnolence, weakness,   headache and vomiting.  Performed by "[facility]  [address] [place] [address] [phone]  [email or web address], Ernesto Mehta MD, Lab. Director         If you have any questions  or concerns, please call the clinic at the number listed above.       Sincerely,        Joel Daniel Wegener, MD/nr

## 2018-04-10 NOTE — PROGRESS NOTES
SUBJECTIVE:   Ban Petersen is a 73 year old female who presents to clinic today for the following health issues:      Patient here to discuss medication and refills.           Seizure disorder (H)  Macular degeneration (senile) of retina  Specific phobia  Chronic pain syndrome  Spondylosis of lumbar region without myelopathy or radiculopathy  Need for vaccination :living with niece here with her today.  Reviewed status at last note, no change.  No major health events since last visit.  No seizures.  Continues to use low dose pain medications supervised by niece.  Overall she reports herself as feeling happy.  Has all needs cared for.      Continue to not want to do preventive cancer screening given other health issues/age.         Problem list, Medication list, Allergies, and Medical/Social/Surgical histories reviewed in [facility] and updated as appropriate.  Labs reviewed in [facility]  BP Readings from Last 3 Encounters:   04/10/18 120/72   09/26/17 102/68   05/09/17 130/78    Wt Readings from Last 3 Encounters:   08/05/15 160 lb (72.6 kg)   01/30/13 139 lb (63 kg)   12/28/12 145 lb (65.8 kg)                  Patient Active Problem List   Diagnosis     Epilepsy (H)     Flaccid hemiplegia (H)     Family history of osteoporosis     Hemiparesis (H)     IBS (irritable bowel syndrome)     CARDIOVASCULAR SCREENING; LDL GOAL LESS THAN 160     Abdominal pain, unspecified abdominal location     Cholelithiasis with obstruction     Health Care Home     Advanced directives, counseling/discussion     Hip pain     Physical deconditioning     Seizure disorder (H)     Spondylosis of lumbar region without myelopathy or radiculopathy     Chronic pain syndrome     Brain injury, with LOC > 24 hr without return to prior conscious level, patient surviving, sequela     Oropharyngeal dysphagia     Cognitive dysfunction     Aphasia     Past Surgical History:   Procedure Laterality Date     C NONSPECIFIC PROCEDURE      Cholecystectomy      CHOLECYSTECTOMY       ENDOSCOPIC RETROGRADE CHOLANGIOPANCREATOGRAM WITH DIRECT VISUALIZATION SYSTEM AND GENERATOR  5/2/2011    Procedure:ENDOSCOPIC RETROGRADE CHOLANGIOPANCREATOGRAM WITH DIRECT VISUALIZATION SYSTEM AND GENERATOR; with Spyglass, Exchange of Bile Duct Stent, Removal of Bile Duct Stone ; Surgeon:PAIGE MAHER; Location:[facility] OR     ENDOSCOPIC RETROGRADE CHOLANGIOPANCREATOGRAM WITH SPYGLASS  4/1/2011    Procedure:ENDOSCOPIC RETROGRADE CHOLANGIOPANCREATOGRAM WITH SPYGLASS; Biliary Spincterotomy, Endo retrograde insertion of stent into bile duct, Endo retrograde balloon dilation of bilary ducts, Electryhydraulic Lithotripsy; Surgeon:PAIGE MAHER; Location:[facility] OR     ESOPHAGOSCOPY, GASTROSCOPY, DUODENOSCOPY (EGD), COMBINED  6/2/2011    Procedure:COMBINED ESOPHAGOSCOPY, GASTROSCOPY, DUODENOSCOPY (EGD), REMOVE FOREIGN BODY; Surgeon:PAIGE MAHER; Location:[facility] GI     gall stone  5/2011    only took out gallstone, not gallbladder       Social History   Substance Use Topics     Smoking status: Former Smoker     Packs/day: 1.50     Years: 19.00     Types: Cigarettes     Quit date: 10/22/1985     Smokeless tobacco: Never Used     Alcohol use No     No family history on file.      Current Outpatient Prescriptions   Medication Sig Dispense Refill     LORazepam (ATIVAN) 0.5 MG tablet Take 30 minutes prior to eye visits with injections as needed, may repeat in 1-4 hours as needed. 30 tablet 1     gabapentin (NEURONTIN) 400 MG capsule TAKE 1 CAPSULE(400 MG) BY MOUTH TWICE DAILY 180 capsule 3     Phenytoin Sodium Extended 200 MG CAPS Take 200 mg by mouth daily 90 capsule 3     acetaminophen-codeine (TYLENOL WITH CODEINE #3) 300-30 MG per tablet Two tablet in morning and two in evening and one other at noon as needed, #150 for one month supply. See dr. Wegener in person for refills. 150 tablet 5     levETIRAcetam (KEPPRA XR) 750 MG 24 hr tablet TAKE 1 TABLET BY MOUTH DAILY 90 tablet 1     calcium carb 1250 mg, 500 mg  [place],/vitamin D 200 units (OSCAL WITH D) 500-200 MG-UNIT per tablet Take 2 tablets by mouth daily 180 tablet 3     senna-docusate (SENNA PLUS) 8.6-50 MG per tablet 1 TABLET  AT BEDTIME 90 tablet 1     multivitamin, therapeutic with minerals (MULTI-VITAMIN) TABS Take 1 tablet by mouth 2 times daily Preservision AREDS       aspirin 81 MG EC tablet Take 1 tablet (81 mg) by mouth daily 90 tablet 3     ORDER FOR DME One pair of knee high compression stockings of medium compression.  Mid-calf circumference is 30cm. 1 each 0     Pediatric Multivitamins-Fl (MULTI VIT/FL PO) Take  by mouth.       Allergies   Allergen Reactions     No Known Drug Allergies      Recent Labs   Lab Test  04/10/18   1647  09/26/17   1603  09/29/16   1620  08/05/15   1610   11/03/12   0633   03/30/11   0648  03/29/11   1514   A1C   --    --    --    --    --   5.0   --    --    --    LDL   --   130*  128*  120   --   100   --    --    --    HDL   --   54  59  54   --   55   --    --    --    TRIG   --   248*  165*  163*   --   174*   --    --    --    ALT  12  19  15  20   < >   --    < >  319*  447*   CR  0.71  0.60  0.57  0.69   < >   --    < >  0.62  0.67   GFRESTIMATED  81  >90  >90  Non African American GFR Calc    84   < >   --    < >  >90  88   GFRESTBLACK  >90  >90  >90  African American GFR Calc    >90   GFR Calc     < >   --    < >  >90  >90   POTASSIUM  4.9  4.6  4.5  4.3   < >   --    < >  4.0  4.3   TSH   --    --    --    --    --    --    --   0.77  1.09    < > = values in this interval not displayed.        ROS:  Constitutional, HEENT, cardiovascular, pulmonary, GI, , musculoskeletal, neuro, skin, endocrine and psych systems are negative, except as otherwise noted.        OBJECTIVE:  /72  Pulse 69  Temp 97.9  F (36.6  C) (Oral)  SpO2 99%    EXAM:  GENERAL APPEARANCE: healthy, alert and no distress  In wheelchair today.   Can stand with assistance but cannot walk or transfer on her own.  Cannot push the  wheelchair due to low arm strength.   Slightly dysarthric speech, unchanged from past.   RESP: lungs clear to auscultation - no rales, rhonchi or wheezes  CV: regular rates and rhythm, normal S1 S2, no S3 or S4 and no murmur, click or rub -  ABDOMEN:  soft, nontender, no HSM or masses and bowel sounds normal      ASSESSMENT AND PLAN  Patient Instructions   ASSESSMENT AND PLAN  1. Seizure disorder (H)  No big changes since last visit.  Living with daughter.  No recent seizures.  Check levels and toxicity mponitoring labs today.   - Phenytoin Sodium Extended 200 MG CAPS; Take 200 mg by mouth daily  Dispense: 90 capsule; Refill: 3  - Comprehensive metabolic panel  - CBC with platelets  - Phenytoin level  - Keppra (Levetiracetam) Level    2. Macular degeneration (senile) of retina  Continues frequent treatments needing anti-anxiety med for procedures.   - LORazepam (ATIVAN) 0.5 MG tablet; Take 30 minutes prior to eye visits with injections as needed, may repeat in 1-4 hours as needed.  Dispense: 30 tablet; Refill: 1    3. Specific phobia    - LORazepam (ATIVAN) 0.5 MG tablet; Take 30 minutes prior to eye visits with injections as needed, may repeat in 1-4 hours as needed.  Dispense: 30 tablet; Refill: 1    4. Chronic pain syndrome  Refilled.   - gabapentin (NEURONTIN) 400 MG capsule; TAKE 1 CAPSULE(400 MG) BY MOUTH TWICE DAILY  Dispense: 180 capsule; Refill: 3    5. Spondylosis of lumbar region without myelopathy or radiculopathy  Refilled chronic contract pain medications, signed contract.  Supervised by family.     - acetaminophen-codeine (TYLENOL WITH CODEINE #3) 300-30 MG per tablet; Two tablet in morning and two in evening and one other at noon as needed, #150 for one month supply. See dr. Wegener in person for refills.  Dispense: 150 tablet; Refill: 5    6.  Immunizations:  tdap and pcv 13 today, will wait on new shingles vaccine.     7. Confirmed again with pt and family continues to no longer desire preventive  "cancer screening.         MYCHART FOR ON-LINE CARE(VISITS), LABS, REFILLS, MESSAGING, ETC [email or web address] [phone]    E-VISIT: click \"on-line care, then request e-visit\".  E-visits work well for following up on issues we have discussed in clinic previously which may need new prescriptions, new prescriptions or substantial discussion. These are always done by me (Dr. Wegener).     ONCARE VISIT:  [email or web address]  - we treat nearly 50 common conditions through on-care.  These are done in an hour by on-call staff.     RADIOLOGY:  [facility]:  [phone]   [facility]: [phone]    Mammogram and Colonoscopy [phone]    Smoking Cessation: [email or web address], 8-170-887-PLAN ([phone])      CONSUMER PRICE LINE for estimates of test costs:  [phone]       Joel Wegener,MD   and edited the medical student s documentation acting as scribe and verify that the history, exam and medical decision making reflect the history, exam and decision making performed by myself today.        "

## 2018-04-10 NOTE — MR AVS SNAPSHOT
After Visit Summary   4/10/2018    Ban Petersen    MRN: [id number]           Patient Information     Date Of Birth          1945        Visit Information        Provider Department      4/10/2018 3:20 PM Wegener, Joel Daniel Irwin, MD [facility]        Today's Diagnoses     Seizure disorder (H)    -  1    Macular degeneration (senile) of retina        Specific phobia        Chronic pain syndrome        Spondylosis of lumbar region without myelopathy or radiculopathy          Care Instructions    ASSESSMENT AND PLAN  1. Seizure disorder (H)  No big changes since last visit.  Living with daughter.  No recent seizures.  Check levels and toxicity mponitoring labs today.   - Phenytoin Sodium Extended 200 MG CAPS; Take 200 mg by mouth daily  Dispense: 90 capsule; Refill: 3  - Comprehensive metabolic panel  - CBC with platelets  - Phenytoin level  - Keppra (Levetiracetam) Level    2. Macular degeneration (senile) of retina  Continues frequent treatments needing anti-anxiety med for procedures.   - LORazepam (ATIVAN) 0.5 MG tablet; Take 30 minutes prior to eye visits with injections as needed, may repeat in 1-4 hours as needed.  Dispense: 30 tablet; Refill: 1    3. Specific phobia    - LORazepam (ATIVAN) 0.5 MG tablet; Take 30 minutes prior to eye visits with injections as needed, may repeat in 1-4 hours as needed.  Dispense: 30 tablet; Refill: 1    4. Chronic pain syndrome  Refilled.   - gabapentin (NEURONTIN) 400 MG capsule; TAKE 1 CAPSULE(400 MG) BY MOUTH TWICE DAILY  Dispense: 180 capsule; Refill: 3    5. Spondylosis of lumbar region without myelopathy or radiculopathy  Refilled chronic contract pain medications, signed contract.  Supervised by family.     - acetaminophen-codeine (TYLENOL WITH CODEINE #3) 300-30 MG per tablet; Two tablet in morning and two in evening and one other at noon as needed, #150 for one month supply. See dr. Wegener in person for refills.  Dispense: 150  "tablet; Refill: 5    6.  Immunizations:  tdap and pcv 13 today, will wait on new shingles vaccine.     7. Confirmed again with pt and family continues to no longer desire preventive cancer screening.         get2play FOR ON-LINE CARE(VISITS), LABS, REFILLS, MESSAGING, ETC [email or web address] , [phone]    E-VISIT: click \"on-line care, then request e-visit\".  E-visits work well for following up on issues we have discussed in clinic previously which may need new prescriptions, new prescriptions or substantial discussion. These are always done by me (Dr. Wegener).     ONCARE VISIT:  [email or web address]  - we treat nearly 50 common conditions through on-care.  These are done in an hour by on-call staff.     RADIOLOGY:  [facility]:  [phone]   [facility]: [phone]    Mammogram and Colonoscopy [phone]    Smoking Cessation: www.[email or web address], 8-126-618-PLAN ([phone])      CONSUMER PRICE LINE for estimates of test costs:  [phone]               Follow-ups after your visit        Who to contact     If you have questions or need follow up information about today's clinic visit or your schedule please contact [facility] directly at [phone].  Normal or non-critical lab and imaging results will be communicated to you by CREOpointhart, letter or phone within 4 business days after the clinic has received the results. If you do not hear from us within 7 days, please contact the clinic through Activehourst or phone. If you have a critical or abnormal lab result, we will notify you by phone as soon as possible.  Submit refill requests through Proxible or call your pharmacy and they will forward the refill request to us. Please allow 3 business days for your refill to be completed.          Additional Information About Your Visit        CREOpointharreBounces Information     Proxible lets you send messages to your doctor, view your test results, renew your prescriptions, schedule " "appointments and more. To sign up, go to [email or web address] . Click on \"Log in\" on the left side of the screen, which will take you to the Welcome page. Then click on \"Sign up Now\" on the right side of the page.     You will be asked to enter the access code listed below, as well as some personal information. Please follow the directions to create your username and password.     Your access code is: [email or web address]  Expires: 2018  4:12 PM     Your access code will  in 90 days. If you need help or a new code, please call your [facility] clinic or [phone].        Care EveryWhere ID     This is your Care EveryWhere ID. This could be used by other organizations to access your [facility] medical records  [id number]        Your Vitals Were     Pulse Temperature Pulse Oximetry             69 97.9  F (36.6  C) (Oral) 99%          Blood Pressure from Last 3 Encounters:   04/10/18 120/72   17 102/68   17 130/78    Weight from Last 3 Encounters:   08/05/15 160 lb (72.6 kg)   13 139 lb (63 kg)   12 145 lb (65.8 kg)              We Performed the Following     CBC with platelets     Comprehensive metabolic panel     Keppra (Levetiracetam) Level     Phenytoin level          Today's Medication Changes          These changes are accurate as of 4/10/18  4:12 PM.  If you have any questions, ask your nurse or doctor.               These medicines have changed or have updated prescriptions.        Dose/Directions    gabapentin 400 MG capsule   Commonly known as:  NEURONTIN   This may have changed:  See the new instructions.   Used for:  Chronic pain syndrome   Changed by:  Wegener, Joel Daniel Irwin, MD        TAKE 1 CAPSULE(400 MG) BY MOUTH TWICE DAILY   Quantity:  180 capsule   Refills:  3            Where to get your medicines      These medications were sent to [facility] [address] AT [address] [address]     Phone:  " [phone]     gabapentin 400 MG capsule    Phenytoin Sodium Extended 200 MG Caps         Some of these will need a paper prescription and others can be bought over the counter.  Ask your nurse if you have questions.     Bring a paper prescription for each of these medications     acetaminophen-codeine 300-30 MG per tablet    LORazepam 0.5 MG tablet                Primary Care Provider Office Phone # Fax #    Joel Daniel Irwin Wegener, [phone] [id number]  [facility] [address]        Equal Access to Services     JUSTICE [facility] Center[facility] : Hadii aad ku hadasho Soomaali, waaxda luqadaha, qaybta kaalmada adeegyada, waxay idiin hayaan adeeg kharash lalainey . So [facility] [phone].    ATENCIÓN: Si habla español, tiene a beltran disposición servicios gratuitos de asistencia lingüística. [facility] [phone].    We comply with applicable federal civil rights laws and [place] laws. We do not discriminate on the basis of race, color, national origin, age, disability, sex, sexual orientation, or gender identity.            Thank you!     Thank you for choosing [facility]  for your care. Our goal is always to provide you with excellent care. Hearing back from our patients is one way we can continue to improve our services. Please take a few minutes to complete the written survey that you may receive in the mail after your visit with us. Thank you!             Your Updated Medication List - Protect others around you: Learn how to safely use, store and throw away your medicines at www.disposemymeds.org.          This list is accurate as of 4/10/18  4:12 PM.  Always use your most recent med list.                   Brand Name Dispense Instructions for use Diagnosis    acetaminophen-codeine 300-30 MG per tablet    TYLENOL WITH CODEINE #3    150 tablet    Two tablet in morning and two in evening and one other at noon as needed, #150 for one month supply. See dr. Wegener in person for refills.     Spondylosis of lumbar region without myelopathy or radiculopathy       aspirin 81 MG EC tablet     90 tablet    Take 1 tablet (81 mg) by mouth daily    Hx of long term use of blood thinners       Calcium carb-Vitamin D 500 mg [place]-200 units 500-200 MG-UNIT per tablet    OSCAL with D;Oyster Shell Calcium    180 tablet    Take 2 tablets by mouth daily    Osteoporosis       gabapentin 400 MG capsule    NEURONTIN    180 capsule    TAKE 1 CAPSULE(400 MG) BY MOUTH TWICE DAILY    Chronic pain syndrome       levETIRAcetam 750 MG 24 hr tablet    KEPPRA XR    90 tablet    TAKE 1 TABLET BY MOUTH DAILY    Seizure disorder (H)       LORazepam 0.5 MG tablet    ATIVAN    30 tablet    Take 30 minutes prior to eye visits with injections as needed, may repeat in 1-4 hours as needed.    Macular degeneration (senile) of retina, Specific phobia       MULTI VIT/FL PO      Take  by mouth.        Multi-vitamin Tabs tablet      Take 1 tablet by mouth 2 times daily Preservision AREDS        order for DME     1 each    One pair of knee high compression stockings of medium compression.  Mid-calf circumference is 30cm.    Leg edema       Phenytoin Sodium Extended 200 MG Caps     90 capsule    Take 200 mg by mouth daily    Seizure disorder (H)       senna-docusate 8.6-50 MG per tablet    SENNA PLUS    90 tablet    1 TABLET  AT BEDTIME    IBS (irritable bowel syndrome)

## 2018-04-10 NOTE — LETTER
[facility]    04/10/18    Patient: Ban Petersen  YOB: 1945  Medical Record Number: [id number]                                                                  Controlled Substance Agreement  I understand that my care provider has prescribed controlled substances (narcotics, tranquilizers, and/or stimulants) to help manage my condition(s).  I am taking this medicine to help me function or work.  I know that this is strong medicine.  It could have serious side effects and even cause a dependency on the drug.  If I stop these medicines suddenly, I could have severe withdrawal symptoms.    The risks, benefits, and side effects of these medication(s) were explained to me.  I agree that:  1. I will take part in other treatments as advised by my provider.  This may be psychiatry or counseling, physical therapy, behavioral therapy, group treatment, or a referral to a pain clinic.  I will reduce or stop my medicine when my provider tells me to do so.   2. I will take my medicines as prescribed.  I will not change the dose or schedule unless my provider tells me to.  There will be no refills if I  run out early.   I may be contacted at any time without warning and asked to complete a drug test or pill count.   3. I will keep all my appointments at the clinic.  If I miss appointments or fail to follow instructions, my provider may stop my medicine.  4. I will not ask other providers to prescribe controlled substances. And I will not accept controlled substances from other people. If I need another prescribed controlled substance for a new reason, I will notify my provider within one business day.  5. If I enroll in the [place] Medical Marijuana program, I will tell my provider.  I will also sign an agreement to share my medical records with my provider.  6. I will use one pharmacy to fill all of my controlled substance prescriptions.  If my prescription is mailed to my pharmacy, it may take 5  to 7 days for my medicine to be ready.  7. I understand that my provider, clinic care team, and pharmacy can track controlled substance prescriptions from other providers through a central database (prescription monitoring program).  8. I will bring in my list of medications (or my medicine bottles) each time I come to the clinic.  REV- 04/2016                                                                                                                                            Page 1 of 2      [facility]    04/10/18    Patient: Ban Petersen  YOB: 1945  Medical Record Number: [id number]    9. Refills of controlled substances will be made only during office hours.  It is up to me to make sure that I do not run out of my medicines on weekends or holidays.    10. I am responsible for my prescriptions.  If the medicine is lost or stolen, it will not be replaced.   I also agree not to share these medicines with anyone.  11. I agree to not use ANY illegal or recreational drugs.  This includes marijuana, cocaine, bath salts or other drugs.  I agree not to use alcohol unless my provider says I may.  I agree to give urine samples whenever asked.  If I fail to give a urine sample, the provider may stop my medicine.     12. I will tell my nurse or provider right away if I become pregnant or have a new medical problem treated outside of [facility].  13. I understand that this medicine can affect my thinking and judgment.  It may be unsafe for me to drive, use machinery and do dangerous tasks.  I will not do any of these things until I know how the medicine affects me.  If my dose changes, I will wait to see how it affects me.  I will contact my provider if I have concerns about medicine side effects.  I understand that if I do not follow any of the conditions above, my prescriptions or treatment may be stopped.    I agree that my provider, clinic care team, and pharmacy may work with any  city, state or federal law enforcement agency that investigates the misuse, sale, or other diversion of my controlled medicine. I will allow my provider to discuss my care with or share a copy of this agreement with any other treating provider, pharmacy or emergency room where I receive care.  I agree to give up (waive) any right of privacy or confidentiality with respect to these authorizations.   I have read this agreement and have asked questions about anything I did not understand.   ___________________________________    ___________________________  Patient Signature                                                           Date and Time  ___________________________________     ____________________________  Witness                                                                            Date and Time  ___________________________________  Joel Daniel Wegener, MD  REV-  04/2016                                                                                                                                                                 Page 2 of 2

## 2018-04-10 NOTE — PATIENT INSTRUCTIONS
"ASSESSMENT AND PLAN  1. Seizure disorder (H)  No big changes since last visit.  Living with daughter.  No recent seizures.  Check levels and toxicity mponitoring labs today.   - Phenytoin Sodium Extended 200 MG CAPS; Take 200 mg by mouth daily  Dispense: 90 capsule; Refill: 3  - Comprehensive metabolic panel  - CBC with platelets  - Phenytoin level  - Keppra (Levetiracetam) Level    2. Macular degeneration (senile) of retina  Continues frequent treatments needing anti-anxiety med for procedures.   - LORazepam (ATIVAN) 0.5 MG tablet; Take 30 minutes prior to eye visits with injections as needed, may repeat in 1-4 hours as needed.  Dispense: 30 tablet; Refill: 1    3. Specific phobia    - LORazepam (ATIVAN) 0.5 MG tablet; Take 30 minutes prior to eye visits with injections as needed, may repeat in 1-4 hours as needed.  Dispense: 30 tablet; Refill: 1    4. Chronic pain syndrome  Refilled.   - gabapentin (NEURONTIN) 400 MG capsule; TAKE 1 CAPSULE(400 MG) BY MOUTH TWICE DAILY  Dispense: 180 capsule; Refill: 3    5. Spondylosis of lumbar region without myelopathy or radiculopathy  Refilled chronic contract pain medications, signed contract.  Supervised by family.     - acetaminophen-codeine (TYLENOL WITH CODEINE #3) 300-30 MG per tablet; Two tablet in morning and two in evening and one other at noon as needed, #150 for one month supply. See dr. Wegener in person for refills.  Dispense: 150 tablet; Refill: 5    6.  Immunizations:  tdap and pcv 13 today, will wait on new shingles vaccine.     7. Confirmed again with pt and family continues to no longer desire preventive cancer screening.         MYCHART FOR ON-LINE CARE(VISITS), LABS, REFILLS, MESSAGING, ETC [email or web address] , [phone]    E-VISIT: click \"on-line care, then request e-visit\".  E-visits work well for following up on issues we have discussed in clinic previously which may need new prescriptions, new prescriptions or substantial discussion. " These are always done by me (Dr. Wegener).     ONCARE VISIT:  [email or web address]  - we treat nearly 50 common conditions through on-care.  These are done in an hour by on-call staff.     RADIOLOGY:  [facility]:  [phone]   [facility]: [phone]    Mammogram and Colonoscopy [phone]    Smoking Cessation: www.quitplan.org, 0-726-637-PLAN ([phone])      CONSUMER PRICE LINE for estimates of test costs:  [phone]

## 2018-04-10 NOTE — NURSING NOTE
Screening Questionnaire for Adult Immunization    Are you sick today?   No   Do you have allergies to medications, food, a vaccine component or latex?   No   Have you ever had a serious reaction after receiving a vaccination?   No   Do you have a long-term health problem with heart disease, lung disease, asthma, kidney disease, metabolic disease (e.g. diabetes), anemia, or other blood disorder?   No   Do you have cancer, leukemia, HIV/AIDS, or any other immune system problem?   No   In the past 3 months, have you taken medications that affect  your immune system, such as prednisone, other steroids, or anticancer drugs; drugs for the treatment of rheumatoid arthritis, Crohn s disease, or psoriasis; or have you had radiation treatments?   No   Have you had a seizure, or a brain or other nervous system problem?   No   During the past year, have you received a transfusion of blood or blood     products, or been given immune (gamma) globulin or antiviral drug?   No   For women: Are you pregnant or is there a chance you could become        pregnant during the next month?   No   Have you received any vaccinations in the past 4 weeks?   No     Immunization questionnaire answers were all negative.        Per orders of Dr. Wegener, injection of Adacel, Prevnar 13 given by Neto Glaser. Patient instructed to remain in clinic for 15 minutes afterwards, and to report any adverse reaction to me immediately.       Screening performed by Neto Glaser on 4/10/2018 at 5:22 PM.  Neto Glaser [facility]

## 2018-04-11 LAB
ALBUMIN SERPL-MCNC: 3.8 G/DL (ref 3.4–5)
ALP SERPL-CCNC: 83 U/L (ref 40–150)
ALT SERPL W P-5'-P-CCNC: 12 U/L (ref 0–50)
ANION GAP SERPL CALCULATED.3IONS-SCNC: 9 MMOL/L (ref 3–14)
AST SERPL W P-5'-P-CCNC: 15 U/L (ref 0–45)
BILIRUB SERPL-MCNC: 0.2 MG/DL (ref 0.2–1.3)
BUN SERPL-MCNC: 19 MG/DL (ref 7–30)
CALCIUM SERPL-MCNC: 9.7 MG/DL (ref 8.5–10.1)
CHLORIDE SERPL-SCNC: 112 MMOL/L (ref 94–109)
CO2 SERPL-SCNC: 22 MMOL/L (ref 20–32)
CREAT SERPL-MCNC: 0.71 MG/DL (ref 0.52–1.04)
GFR SERPL CREATININE-BSD FRML MDRD: 81 ML/MIN/1.7M2
GLUCOSE SERPL-MCNC: 84 MG/DL (ref 70–99)
PHENYTOIN SERPL-MCNC: 10.4 MG/L (ref 10–20)
POTASSIUM SERPL-SCNC: 4.9 MMOL/L (ref 3.4–5.3)
PROT SERPL-MCNC: 7.5 G/DL (ref 6.8–8.8)
SODIUM SERPL-SCNC: 143 MMOL/L (ref 133–144)

## 2018-04-12 LAB — LEVETIRACETAM SERPL-MCNC: 12 UG/ML (ref 12–46)

## 2018-05-16 ENCOUNTER — TELEPHONE (OUTPATIENT)
Dept: LAB | Facility: CLINIC | Age: 73
End: 2018-05-16

## 2018-05-16 DIAGNOSIS — Z12.11 SPECIAL SCREENING FOR MALIGNANT NEOPLASMS, COLON: Primary | ICD-10-CM

## 2018-05-17 NOTE — TELEPHONE ENCOUNTER
Received fit test request from lab (not patient generated) however this patient no longer doing fit tests appropriately.     Joel Wegener,MD

## 2018-06-01 ENCOUNTER — PATIENT OUTREACH (OUTPATIENT)
Dept: GERIATRIC MEDICINE | Facility: CLINIC | Age: 73
End: 2018-06-01

## 2018-06-01 NOTE — PROGRESS NOTES
[facility] Care Coordination Contact  CM notified today client was admitted to [facility] on 5/30 with a fractured humerus. CM spoke with her nurse at the hospital and was informed [facility].   Hillary Romano RN  [facility]   [phone]

## 2018-06-04 ENCOUNTER — NURSING HOME VISIT (OUTPATIENT)
Dept: GERIATRICS | Facility: CLINIC | Age: 73
End: 2018-06-04
Payer: COMMERCIAL

## 2018-06-04 ENCOUNTER — PATIENT OUTREACH (OUTPATIENT)
Dept: GERIATRIC MEDICINE | Facility: CLINIC | Age: 73
End: 2018-06-04

## 2018-06-04 VITALS
SYSTOLIC BLOOD PRESSURE: 116 MMHG | RESPIRATION RATE: 20 BRPM | HEART RATE: 108 BPM | BODY MASS INDEX: 25.44 KG/M2 | OXYGEN SATURATION: 93 % | TEMPERATURE: 98.8 F | WEIGHT: 152.9 LBS | DIASTOLIC BLOOD PRESSURE: 78 MMHG

## 2018-06-04 DIAGNOSIS — G40.909 SEIZURE DISORDER (H): ICD-10-CM

## 2018-06-04 DIAGNOSIS — K59.01 SLOW TRANSIT CONSTIPATION: ICD-10-CM

## 2018-06-04 DIAGNOSIS — Z87.820 H/O TRAUMATIC BRAIN INJURY: ICD-10-CM

## 2018-06-04 DIAGNOSIS — G83.9 PARALYSIS (H): ICD-10-CM

## 2018-06-04 DIAGNOSIS — R41.89 COGNITIVE IMPAIRMENT: ICD-10-CM

## 2018-06-04 DIAGNOSIS — S42.201S CLOSED FRACTURE OF PROXIMAL END OF RIGHT HUMERUS, UNSPECIFIED FRACTURE MORPHOLOGY, SEQUELA: Primary | ICD-10-CM

## 2018-06-04 PROCEDURE — 99310 SBSQ NF CARE HIGH MDM 45: CPT | Performed by: NURSE PRACTITIONER

## 2018-06-04 RX ORDER — POLYETHYLENE GLYCOL 3350 17 G/17G
17 POWDER, FOR SOLUTION ORAL DAILY PRN
COMMUNITY
End: 2020-02-17
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
## 2018-06-05 NOTE — PROGRESS NOTES
[facility] Care Coordination Contact  CM contacted the SW at [facility] to inform her of the services client receives in her home and to give CM's contact information. She states client's brother Pavel (client's guardian) is a resident there with dementia and the SW is working on changing guardian for client because he is no longer capable of making decisions for client. CM to continue to follow.  Hillary Romano RN  [facility]   [phone]

## 2018-06-07 ENCOUNTER — NURSING HOME VISIT (OUTPATIENT)
Dept: GERIATRICS | Facility: CLINIC | Age: 73
End: 2018-06-07
Payer: COMMERCIAL

## 2018-06-07 VITALS
HEART RATE: 95 BPM | RESPIRATION RATE: 16 BRPM | BODY MASS INDEX: 25.39 KG/M2 | SYSTOLIC BLOOD PRESSURE: 137 MMHG | TEMPERATURE: 97.7 F | WEIGHT: 152.6 LBS | OXYGEN SATURATION: 93 % | DIASTOLIC BLOOD PRESSURE: 86 MMHG

## 2018-06-07 DIAGNOSIS — K59.01 SLOW TRANSIT CONSTIPATION: ICD-10-CM

## 2018-06-07 DIAGNOSIS — R41.89 COGNITIVE IMPAIRMENT: ICD-10-CM

## 2018-06-07 DIAGNOSIS — G40.909 SEIZURE DISORDER (H): ICD-10-CM

## 2018-06-07 DIAGNOSIS — Z87.820 H/O TRAUMATIC BRAIN INJURY: ICD-10-CM

## 2018-06-07 DIAGNOSIS — S42.201S CLOSED FRACTURE OF PROXIMAL END OF RIGHT HUMERUS, UNSPECIFIED FRACTURE MORPHOLOGY, SEQUELA: Primary | ICD-10-CM

## 2018-06-07 DIAGNOSIS — G83.9 PARALYSIS (H): ICD-10-CM

## 2018-06-07 PROCEDURE — 99309 SBSQ NF CARE MODERATE MDM 30: CPT | Performed by: NURSE PRACTITIONER

## 2018-06-07 NOTE — PROGRESS NOTES
[facility]    Chief Complaint   Patient presents with     ELIZ       [facility] Medical Record Number:  [id number]    HPI:    Ban Petersen is a 73 year old  (1945), who is being seen today for an episodic care visit at [facility].  Hospital stay was at [facility] [place] from 5/30/18 through 6/1/18. PMH TBI, cognitive impairment and right sided paralysis admitted on 5/30/18 after mechanical fall at home, found to have right proximal humerus fracture . Fracture was dicussed with ortho service who recommend conservative non-operative management, sling for comfort, and ortho follow up.     Admitted to this facility for  rehab, medical management and nursing care.     HPI information obtained from: facility chart records, facility staff, patient report and [facility] chart review.    Today's concern is:  HPI is challenging, patient is rambling. She is trying to talk about her eyes/vision, but cannot say what is bothering her or if she is having vision changes. She later asks if she can have her hair washed    Closed fracture of proximal end of right humerus, unspecified fracture morphology, sequela  She indicates that she does have pain in her arm. She thinks that the sling is not fitting her properly, her hand hangs down off the end of it and this causes increased pain    H/O traumatic brain injury  Cognitive impairment  Paralysis (H)  Lives with her two brothers who are very involved. They have had several conversations with staff about how to care for Wen. She is currently max assist for transfers, sat up at the edge of the bed for 1 minute. BIMS 7/15. Legally blind.    Seizure disorder (H)  Chronic    Slow transit constipation  Patient unable to say if bowels are moving. Nursing has no acute concerns        ALLERGIES: No known drug allergies  Past Medical, Surgical, Family and Social History reviewed and [facility] in [facility].    Current Outpatient Prescriptions   Medication Sig Dispense Refill      Acetaminophen (TYLENOL PO) Take 975 mg by mouth every 6 hours as needed for mild pain or fever       Calcium Carb-Cholecalciferol 600-200 MG-UNIT TABS Take 1 tablet by mouth daily       gabapentin (NEURONTIN) 400 MG capsule TAKE 1 CAPSULE(400 MG) BY MOUTH TWICE DAILY 180 capsule 3     levETIRAcetam (KEPPRA XR) 750 MG 24 hr tablet TAKE 1 TABLET BY MOUTH DAILY 90 tablet 1     LORazepam (ATIVAN) 0.5 MG tablet Take 30 minutes prior to eye visits with injections as needed, may repeat in 1-4 hours as needed. 30 tablet 1     multivitamin, therapeutic with minerals (MULTI-VITAMIN) TABS Take 1 tablet by mouth 2 times daily Preservision AREDS       ORDER FOR DME One pair of knee high compression stockings of medium compression.  Mid-calf circumference is 30cm. 1 each 0     OXYCODONE HCL PO Take 5 mg by mouth every 4 hours as needed       Phenytoin Sodium Extended 200 MG CAPS Take 200 mg by mouth daily 90 capsule 3     polyethylene glycol (MIRALAX/GLYCOLAX) Packet Take 17 g by mouth daily as needed for constipation       Medications reviewed:  Medications reconciled to facility chart and changes were made to reflect current medications as identified as above med list.    REVIEW OF SYSTEMS:  Unobtainable secondary to cognitive impairment.     Physical Exam:  /86  Pulse 95  Temp 97.7  F (36.5  C)  Resp 16  Wt 152 lb 9.6 oz (69.2 kg)  SpO2 93%  BMI 25.39 kg/m2   GENERAL APPEARANCE:  Drowsy, no apparent distress  ENT:  Mouth and posterior oropharynx normal, moist mucous membranes  EYES:  EOM normal, Conjunctiva and lids normal, difficulty opening R eye  NECK:  No adenopathy,masses or thyromegaly  RESP:  respiratory effort and palpation of chest normal, lungs clear to auscultation , no respiratory distress  CV:  Palpation and auscultation of heart done , regular rate and rhythm, no murmur, rub, or gallop  ABDOMEN:  normal bowel sounds, soft, nontender, no hepatosplenomegaly or other masses  SKIN:  Resolving bruise R  temple/forehead, R arm  PSYCH:  oriented to self, insight and judgement impaired, memory impaired       Recent Labs:       CBC RESULTS:   Recent Labs   Lab Test  04/10/18   1647  09/26/17   1603   WBC  6.1  6.9   RBC  4.25  4.21   HGB  13.8  13.7   HCT  42.6  41.9   MCV  100  100   MCH  32.5  32.5   MCHC  32.4  32.7   RDW  12.6  12.7   PLT  210  229       Last Basic Metabolic Panel:  Recent Labs   Lab Test  04/10/18   1647  09/26/17   1603   NA  143  143   POTASSIUM  4.9  4.6   CHLORIDE  112*  110*   MILA  9.7  9.6   CO2  22  22   BUN  19  18   CR  0.71  0.60   GLC  84  75       Liver Function Studies -   Recent Labs   Lab Test  04/10/18   1647  09/26/17   1603   PROTTOTAL  7.5  7.3   ALBUMIN  3.8  3.7   BILITOTAL  0.2  0.2   ALKPHOS  83  85   AST  15  17   ALT  12  19       TSH   Date Value Ref Range Status   03/30/2011 0.77 0.4 - 5.0 mU/L Final   03/29/2011 1.09 0.4 - 5.0 mU/L Final       Lab Results   Component Value Date    A1C 5.0 11/03/2012         Assessment/Plan:  (S42.201S) Closed fracture of proximal end of right humerus, unspecified fracture morphology, sequela  (primary encounter diagnosis)  Comment: Non-surgical treatment. Pain is difficult to assess at this point. It is unknown if she will need to remain in TCU while her fracture heals or if she could go home.  Plan: PT/OT eval and treat, discharge planning per their recommendation. Continue oxycodone prn for pain, monitor pain severity. F/u ortho as scheduled.    (Z87.820) H/O traumatic brain injury  (R41.89) Cognitive impairment  Comment: Mental status currently appears declined from baseline. This may be due to fatigue, medication side effect. She does not appear acutely ill.   Plan: Nursing to monitor mental status. 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 Wheelchair bound. Family provides ADL assistance.  Plan: Continue current POC with no changes at this time and adjustments as  needed.    (G40.909) Seizure disorder (H)  Comment: Chronic condition being managed with medications.  Plan: Continue current POC with no changes at this time and adjustments as needed.    (K59.01) Slow transit constipation  Comment: No acute concerns, will need to monitor while on oxycodone  Plan: Cont Miralax prn. Monitor bowel function. Adjust medication as clinically indicated.      Electronically signed by  MILES Kumar [facility]  Pager: [phone]

## 2018-06-07 NOTE — LETTER
6/7/2018        RE: Ban Petersen  [address]  [facility] [address]        [facility]    Chief Complaint   Patient presents with     RECHECK       [facility] Medical Record Number:  [id number]    HPI:    Ban Petersen is a 73 year old  (1945), who is being seen today for an episodic care visit at [facility].  Hospital stay was at [facility] from 5/30/18 through 6/1/18. PMH TBI, cognitive impairment and right sided paralysis admitted on 5/30/18 after mechanical fall at home, found to have right proximal humerus fracture . Fracture was dicussed with ortho service who recommend conservative non-operative management, sling for comfort, and ortho follow up.     Admitted to this facility for  rehab, medical management and nursing care.     HPI information obtained from: facility chart records, facility staff, patient report and [facility] chart review.    Today's concern is:  HPI is challenging, patient is rambling. She is trying to talk about her eyes/vision, but cannot say what is bothering her or if she is having vision changes. She later asks if she can have her hair washed    Closed fracture of proximal end of right humerus, unspecified fracture morphology, sequela  She indicates that she does have pain in her arm. She thinks that the sling is not fitting her properly, her hand hangs down off the end of it and this causes increased pain    H/O traumatic brain injury  Cognitive impairment  Paralysis (H)  Lives with her two brothers who are very involved. They have had several conversations with staff about how to care for Wen. She is currently max assist for transfers, sat up at the edge of the bed for 1 minute. BIMS 7/15. Legally blind.    Seizure disorder (H)  Chronic    Slow transit constipation  Patient unable to say if bowels are moving. Nursing has no acute concerns        ALLERGIES: No known drug allergies  Past Medical, Surgical, Family and Social History reviewed  and updated in [facility].    Current Outpatient Prescriptions   Medication Sig Dispense Refill     Acetaminophen (TYLENOL PO) Take 975 mg by mouth every 6 hours as needed for mild pain or fever       Calcium Carb-Cholecalciferol 600-200 MG-UNIT TABS Take 1 tablet by mouth daily       gabapentin (NEURONTIN) 400 MG capsule TAKE 1 CAPSULE(400 MG) BY MOUTH TWICE DAILY 180 capsule 3     levETIRAcetam (KEPPRA XR) 750 MG 24 hr tablet TAKE 1 TABLET BY MOUTH DAILY 90 tablet 1     LORazepam (ATIVAN) 0.5 MG tablet Take 30 minutes prior to eye visits with injections as needed, may repeat in 1-4 hours as needed. 30 tablet 1     multivitamin, therapeutic with minerals (MULTI-VITAMIN) TABS Take 1 tablet by mouth 2 times daily Preservision AREDS       ORDER FOR DME One pair of knee high compression stockings of medium compression.  Mid-calf circumference is 30cm. 1 each 0     OXYCODONE HCL PO Take 5 mg by mouth every 4 hours as needed       Phenytoin Sodium Extended 200 MG CAPS Take 200 mg by mouth daily 90 capsule 3     polyethylene glycol (MIRALAX/GLYCOLAX) Packet Take 17 g by mouth daily as needed for constipation       Medications reviewed:  Medications reconciled to facility chart and changes were made to reflect current medications as identified as above med list.    REVIEW OF SYSTEMS:  Unobtainable secondary to cognitive impairment.     Physical Exam:  /86  Pulse 95  Temp 97.7  F (36.5  C)  Resp 16  Wt 152 lb 9.6 oz (69.2 kg)  SpO2 93%  BMI 25.39 kg/m2   GENERAL APPEARANCE:  Drowsy, no apparent distress  ENT:  Mouth and posterior oropharynx normal, moist mucous membranes  EYES:  EOM normal, Conjunctiva and lids normal, difficulty opening R eye  NECK:  No adenopathy,masses or thyromegaly  RESP:  respiratory effort and palpation of chest normal, lungs clear to auscultation , no respiratory distress  CV:  Palpation and auscultation of heart done , regular rate and rhythm, no murmur, rub, or gallop  ABDOMEN:  normal bowel  sounds, soft, nontender, no hepatosplenomegaly or other masses  SKIN:  Resolving bruise R temple/forehead, R arm  PSYCH:  oriented to self, insight and judgement impaired, memory impaired       Recent Labs:       CBC RESULTS:   Recent Labs   Lab Test  04/10/18   1647  09/26/17   1603   WBC  6.1  6.9   RBC  4.25  4.21   HGB  13.8  13.7   HCT  42.6  41.9   MCV  100  100   MCH  32.5  32.5   MCHC  32.4  32.7   RDW  12.6  12.7   PLT  210  229       Last Basic Metabolic Panel:  Recent Labs   Lab Test  04/10/18   1647  09/26/17   1603   NA  143  143   POTASSIUM  4.9  4.6   CHLORIDE  112*  110*   MILA  9.7  9.6   CO2  22  22   BUN  19  18   CR  0.71  0.60   GLC  84  75       Liver Function Studies -   Recent Labs   Lab Test  04/10/18   1647  09/26/17   1603   PROTTOTAL  7.5  7.3   ALBUMIN  3.8  3.7   BILITOTAL  0.2  0.2   ALKPHOS  83  85   AST  15  17   ALT  12  19       TSH   Date Value Ref Range Status   03/30/2011 0.77 0.4 - 5.0 mU/L Final   03/29/2011 1.09 0.4 - 5.0 mU/L Final       Lab Results   Component Value Date    A1C 5.0 11/03/2012         Assessment/Plan:  (S42.201S) Closed fracture of proximal end of right humerus, unspecified fracture morphology, sequela  (primary encounter diagnosis)  Comment: Non-surgical treatment. Pain is difficult to assess at this point. It is unknown if she will need to remain in TCU while her fracture heals or if she could go home.  Plan: PT/OT eval and treat, discharge planning per their recommendation. Continue oxycodone prn for pain, monitor pain severity. F/u ortho as scheduled.    (Z87.820) H/O traumatic brain injury  (R41.89) Cognitive impairment  Comment: Mental status currently appears declined from baseline. This may be due to fatigue, medication side effect. She does not appear acutely ill.   Plan: Nursing to monitor mental status. 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 Wheelchair bound. Family provides ADL  assistance.  Plan: Continue current POC with no changes at this time and adjustments as needed.    (G40.299) Seizure disorder (H)  Comment: Chronic condition being managed with medications.  Plan: Continue current POC with no changes at this time and adjustments as needed.    (K59.01) Slow transit constipation  Comment: No acute concerns, will need to monitor while on oxycodone  Plan: Cont Miralax prn. Monitor bowel function. Adjust medication as clinically indicated.      Electronically signed by  MILES Kumar [facility]  Pager: [phone]

## 2018-06-07 NOTE — Clinical Note
I'm sure this could wait until next week, can you check on her sling? She came from [facility], but is a [facility] patient. Do you think you could do the follow up for her fracture??

## 2018-06-10 VITALS
HEART RATE: 89 BPM | BODY MASS INDEX: 25.43 KG/M2 | OXYGEN SATURATION: 94 % | WEIGHT: 152.6 LBS | HEIGHT: 65 IN | TEMPERATURE: 97.5 F | SYSTOLIC BLOOD PRESSURE: 141 MMHG | RESPIRATION RATE: 16 BRPM | DIASTOLIC BLOOD PRESSURE: 70 MMHG

## 2018-06-10 NOTE — PROGRESS NOTES
[facility]    Chief Complaint   Patient presents with     LEIZ       [place] Medical Record Number:  [id number]    HPI:    Ban Petersen is a 73 year old  (1945), who is being seen today for an episodic care visit at [facility].  Hospital [place] [facility] [place] through 6/1/18. PMH TBI, cognitive impairment and right sided paralysis admitted on 5/30/18 after mechanical fall at home, found to have right proximal humerus fracture . Fracture was dicussed with ortho service who recommend conservative non-operative management, sling for comfort, and ortho follow up.     Admitted to this facility for  rehab, medical management and nursing care.     HPI information obtained from: facility chart records, facility staff, patient report and [facility] chart review.    Today's concern is:  HPI is challenging, patient is rambling, very drowsy    Closed fracture of proximal end of right humerus, unspecified fracture morphology, sequela  She indicates that she does have pain in her arm. This is her flaccid arm s/p TBI. Ortho plans to evaluate today due to reports of sling not fitting well.    H/O traumatic brain injury  Cognitive impairment  Paralysis (H)  Lives with her two brothers who are very involved. They have had several conversations with staff about how to care for Wen. She is currently max assist for transfers, sat up at the edge of the bed for 1 minute. BIMS 7/15. Legally blind.    Seizure disorder (H)  Chronic. No witnessed seizures since admission    Slow transit constipation  Patient unable to say if bowels are moving. Nursing has no acute concerns      ALLERGIES: No known drug allergies  Past Medical, Surgical, Family and Social History reviewed and updated in [facility].    Current Outpatient Prescriptions   Medication Sig Dispense Refill     Acetaminophen (TYLENOL PO) Take 975 mg by mouth every 6 hours as needed for mild pain or fever       Calcium Carb-Cholecalciferol  "600-200 MG-UNIT TABS Take 1 tablet by mouth daily       gabapentin (NEURONTIN) 400 MG capsule TAKE 1 CAPSULE(400 MG) BY MOUTH TWICE DAILY 180 capsule 3     levETIRAcetam (KEPPRA XR) 750 MG 24 hr tablet TAKE 1 TABLET BY MOUTH DAILY 90 tablet 1     LORazepam (ATIVAN) 0.5 MG tablet Take 30 minutes prior to eye visits with injections as needed, may repeat in 1-4 hours as needed. 30 tablet 1     multivitamin, therapeutic with minerals (MULTI-VITAMIN) TABS Take 1 tablet by mouth 2 times daily Preservision AREDS       ORDER FOR DME One pair of knee high compression stockings of medium compression.  Mid-calf circumference is 30cm. 1 each 0     OXYCODONE HCL PO Take 5 mg by mouth every 4 hours as needed       Phenytoin Sodium Extended 200 MG CAPS Take 200 mg by mouth daily 90 capsule 3     polyethylene glycol (MIRALAX/GLYCOLAX) Packet Take 17 g by mouth daily as needed for constipation       Medications reviewed:  Medications reconciled to facility chart and changes were made to reflect current medications as identified as above med list.    REVIEW OF SYSTEMS:  Unobtainable secondary to cognitive impairment.     Physical Exam:  /70  Pulse 89  Temp 97.5  F (36.4  C)  Resp 16  Ht 5' 5\" (1.651 m)  Wt 152 lb 9.6 oz (69.2 kg)  SpO2 94%  BMI 25.39 kg/m2     BP Readin/70  132/63  137/86  136/76  116/78  122/72  130/86  127/76    GENERAL APPEARANCE:  Drowsy, no apparent distress  ENT:  Mouth and posterior oropharynx normal, moist mucous membranes  EYES:  EOM normal, Conjunctiva and lids normal, difficulty opening R eye  NECK:  No adenopathy,masses or thyromegaly  RESP:  respiratory effort and palpation of chest normal, lungs clear to auscultation , no respiratory distress  CV:  Palpation and auscultation of heart done , regular rate and rhythm, no murmur, rub, or gallop  ABDOMEN:  normal bowel sounds, soft, nontender, no hepatosplenomegaly or other masses  SKIN:  Resolving bruise R temple/forehead, R arm  PSYCH:  " oriented to self, insight and judgement impaired, memory impaired       Recent Labs:   CBC RESULTS:   Recent Labs   Lab Test  04/10/18   1647  09/26/17   1603   WBC  6.1  6.9   RBC  4.25  4.21   HGB  13.8  13.7   HCT  42.6  41.9   MCV  100  100   MCH  32.5  32.5   MCHC  32.4  32.7   RDW  12.6  12.7   PLT  210  229       Last Basic Metabolic Panel:  Recent Labs   Lab Test  04/10/18   1647  09/26/17   1603   NA  143  143   POTASSIUM  4.9  4.6   CHLORIDE  112*  110*   MILA  9.7  9.6   CO2  22  22   BUN  19  18   CR  0.71  0.60   GLC  84  75       Liver Function Studies -   Recent Labs   Lab Test  04/10/18   1647  09/26/17   1603   PROTTOTAL  7.5  7.3   ALBUMIN  3.8  3.7   BILITOTAL  0.2  0.2   ALKPHOS  83  85   AST  15  17   ALT  12  19       TSH   Date Value Ref Range Status   03/30/2011 0.77 0.4 - 5.0 mU/L Final   03/29/2011 1.09 0.4 - 5.0 mU/L Final       Lab Results   Component Value Date    A1C 5.0 11/03/2012         Assessment/Plan:  (S42.201S) Closed fracture of proximal end of right humerus, unspecified fracture morphology, sequela  (primary encounter diagnosis)  Comment: Non-surgical treatment. Pain is difficult to assess. It is unknown if she will need to remain in TCU while her fracture heals or if she could go home.  Plan: PT/OT eval and treat, discharge planning per their recommendation. Continue oxycodone prn for pain, monitor pain severity. F/u ortho as scheduled.    (Z87.820) H/O traumatic brain injury  (R41.89) Cognitive impairment  Comment: Mental status currently appears declined from baseline. This may be due to fatigue, medication side effect. She does not appear acutely ill.   Plan: Nursing to monitor mental status. 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 Wheelchair bound. Family provides ADL assistance.  Plan: Continue current POC with no changes at this time and adjustments as needed.    (G40.909) Seizure disorder (H)  Comment:  Chronic condition being managed with medications.  Plan: Continue current POC with no changes at this time and adjustments as needed.    (K59.01) Slow transit constipation  Comment: No acute concerns, will need to monitor while on oxycodone  Plan: Cont Miralax prn. Monitor bowel function. Adjust medication as clinically indicated.      Electronically signed by  MILES Kumar [facility]  Pager: [phone]

## 2018-06-11 ENCOUNTER — NURSING HOME VISIT (OUTPATIENT)
Dept: GERIATRICS | Facility: CLINIC | Age: 73
End: 2018-06-11
Payer: COMMERCIAL

## 2018-06-11 ENCOUNTER — NURSING HOME VISIT (OUTPATIENT)
Dept: GERIATRICS | Facility: CLINIC | Age: 73
End: 2018-06-11

## 2018-06-11 DIAGNOSIS — Z87.820 H/O TRAUMATIC BRAIN INJURY: ICD-10-CM

## 2018-06-11 DIAGNOSIS — K59.01 SLOW TRANSIT CONSTIPATION: ICD-10-CM

## 2018-06-11 DIAGNOSIS — S42.201S CLOSED FRACTURE OF PROXIMAL END OF RIGHT HUMERUS, UNSPECIFIED FRACTURE MORPHOLOGY, SEQUELA: Primary | ICD-10-CM

## 2018-06-11 DIAGNOSIS — G83.9 PARALYSIS (H): ICD-10-CM

## 2018-06-11 DIAGNOSIS — R41.89 COGNITIVE IMPAIRMENT: ICD-10-CM

## 2018-06-11 DIAGNOSIS — G40.909 SEIZURE DISORDER (H): ICD-10-CM

## 2018-06-11 PROCEDURE — 99309 SBSQ NF CARE MODERATE MDM 30: CPT | Performed by: NURSE PRACTITIONER

## 2018-06-11 NOTE — MR AVS SNAPSHOT
"              After Visit Summary   2018    Ban Petersen    MRN: [id number]           Patient Information     Date Of Birth          1945        Visit Information        Provider Department      2018 1:03 PM SELENA Jane PA-C [facility]        Today's Diagnoses     Closed fracture of proximal end of right humerus, unspecified fracture morphology, sequela    -  1       Follow-ups after your visit        Who to contact     If you have questions or need follow up information about today's clinic visit or your schedule please contact [facility] directly at [phone].  Normal or non-critical lab and imaging results will be communicated to you by Arkmicrohart, letter or phone within 4 business days after the clinic has received the results. If you do not hear from us within 7 days, please contact the clinic through Arkmicrohart or phone. If you have a critical or abnormal lab result, we will notify you by phone as soon as possible.  Submit refill requests through Sonnedix or call your pharmacy and they will forward the refill request to us. Please allow 3 business days for your refill to be completed.          Additional Information About Your Visit        MyChart Information     Sonnedix lets you send messages to your doctor, view your test results, renew your prescriptions, schedule appointments and more. To sign up, go to [email or web address] . Click on \"Log in\" on the left side of the screen, which will take you to the Welcome page. Then click on \"Sign up Now\" on the right side of the page.     You will be asked to enter the access code listed below, as well as some personal information. Please follow the directions to create your username and password.     Your access code is: [email or web address]  Expires: 2018  4:12 PM     Your access code will  in 90 days. If you need help or a new code, please call your [facility] or [phone].        Care EveryWhere ID  "    This is your Care EveryWhere ID. This could be used by other organizations to access your [place] medical records  [id number]         Blood Pressure from Last 3 Encounters:   06/10/18 141/70   06/07/18 137/86   06/04/18 116/78    Weight from Last 3 Encounters:   06/10/18 152 lb 9.6 oz (69.2 kg)   06/07/18 152 lb 9.6 oz (69.2 kg)   06/04/18 152 lb 14.4 oz (69.4 kg)              Today, you had the following     No orders found for display       Primary Care Provider Office Phone # Fax #    Guillermo Daniel Irwin Wegener, [phone] [address] [facility] [address]        Equal Access to Services     JUSTICE GARCIA : Hadii sedrick ware hadasho Soomaali, waaxda luqadaha, qaybta kaalmada adeegyada, frantz frank . So [facility] [phone].    ATENCIÓN: Si habla español, tiene a beltran disposición servicios gratuitos de asistencia lingüística. Llame al [phone].    We comply with applicable federal civil rights laws and [place] laws. We do not discriminate on the basis of race, color, national origin, age, disability, sex, sexual orientation, or gender identity.            Thank you!     Thank you for choosing [facility]  for your care. Our goal is always to provide you with excellent care. Hearing back from our patients is one way we can continue to improve our services. Please take a few minutes to complete the written survey that you may receive in the mail after your visit with us. Thank you!             Your Updated Medication List - Protect others around you: Learn how to safely use, store and throw away your medicines at www.disposemymeds.org.          This list is accurate as of 6/11/18  1:08 PM.  Always use your most recent med list.                   Brand Name Dispense Instructions for use Diagnosis    Calcium Carb-Cholecalciferol 600-200 MG-UNIT Tabs      Take 1 tablet by mouth daily        gabapentin 400 MG capsule    NEURONTIN    180 capsule     TAKE 1 CAPSULE(400 MG) BY MOUTH TWICE DAILY    Chronic pain syndrome       levETIRAcetam 750 MG 24 hr tablet    KEPPRA XR    90 tablet    TAKE 1 TABLET BY MOUTH DAILY    Seizure disorder (H)       LORazepam 0.5 MG tablet    ATIVAN    30 tablet    Take 30 minutes prior to eye visits with injections as needed, may repeat in 1-4 hours as needed.    Macular degeneration (senile) of retina, Specific phobia       Multi-vitamin Tabs tablet      Take 1 tablet by mouth 2 times daily Preservision AREDS        order for DME     1 each    One pair of knee high compression stockings of medium compression.  Mid-calf circumference is 30cm.    Leg edema       OXYCODONE HCL PO      Take 5 mg by mouth every 4 hours as needed        Phenytoin Sodium Extended 200 MG Caps     90 capsule    Take 200 mg by mouth daily    Seizure disorder (H)       polyethylene glycol Packet    MIRALAX/GLYCOLAX     Take 17 g by mouth daily as needed for constipation        TYLENOL PO      Take 975 mg by mouth every 6 hours as needed for mild pain or fever

## 2018-06-11 NOTE — PROGRESS NOTES
"Ortho Nursing home visit    Ban Petersen is a 73 year old female who resides at [facility]. With hs of recent proximal humerus fracture; treated closed. In sling.    Patient is seen today for on-site eval for above fracture:      Past Medical History:   Diagnosis Date     Hemiplegia, unspecified, affecting unspecified side      Other convulsions      Seizure (H)      Seizures (H)       Past Surgical History:   Procedure Laterality Date     C NONSPECIFIC PROCEDURE      Cholecystectomy     CHOLECYSTECTOMY       ENDOSCOPIC RETROGRADE CHOLANGIOPANCREATOGRAM WITH DIRECT VISUALIZATION SYSTEM AND GENERATOR  5/2/2011    Procedure:ENDOSCOPIC RETROGRADE CHOLANGIOPANCREATOGRAM WITH DIRECT VISUALIZATION SYSTEM AND GENERATOR; with Spyglass, Exchange of Bile Duct Stent, Removal of Bile Duct Stone ; Surgeon:PAIGE MAHER; Location:[facility]     ENDOSCOPIC RETROGRADE CHOLANGIOPANCREATOGRAM WITH SPYGLASS  4/1/2011    Procedure:ENDOSCOPIC RETROGRADE CHOLANGIOPANCREATOGRAM WITH SPYGLASS; Biliary Spincterotomy, Endo retrograde insertion of stent into bile duct, Endo retrograde balloon dilation of bilary ducts, Electryhydraulic Lithotripsy; Surgeon:PAIGE MAHER; Location:[facility]     ESOPHAGOSCOPY, GASTROSCOPY, DUODENOSCOPY (EGD), COMBINED  6/2/2011    Procedure:COMBINED ESOPHAGOSCOPY, GASTROSCOPY, DUODENOSCOPY (EGD), REMOVE FOREIGN BODY; Surgeon:PAIGE MAHER; Location:[facility]     gall stone  5/2011    only took out gallstone, not gallbladder        Allergies   Allergen Reactions     No Known Drug Allergies       There were no vitals taken for this visit.     Exam: Seen today with OT; sling not fitting well, removed re-applied; patient has noted contractures; in hand, unable to use it to , no sure of PPMH: is head trauma, or hs of CVA is in patients hs; but clearly not using RUE: for ADL\"s      ASSESSMENT / PLAN:  Discussed ability to have OT do, PROM wrist elbow, avoid  ER/ abduction and NO  WB RIght upper " ext;      [id number]    Marlon OPA-C  [phone]

## 2018-06-11 NOTE — LETTER
6/11/2018        RE: Ban Petersen  [address]  [facility] [address]        [facility]    Chief Complaint   Patient presents with     RECHECK       [facility] Medical Record Number:  [id number]    HPI:    Ban Petersen is a 73 year old  (1945), who is being seen today for an episodic care visit at [facility].  Hospital stay was at [facility] from 5/30/18 through 6/1/18. PMH TBI, cognitive impairment and right sided paralysis admitted on 5/30/18 after mechanical fall at home, found to have right proximal humerus fracture . Fracture was dicussed with ortho service who recommend conservative non-operative management, sling for comfort, and ortho follow up.     Admitted to this facility for  rehab, medical management and nursing care.     HPI information obtained from: facility chart records, facility staff, patient report and [facility] chart review.    Today's concern is:  HPI is challenging, patient is rambling, very drowsy    Closed fracture of proximal end of right humerus, unspecified fracture morphology, sequela  She indicates that she does have pain in her arm. This is her flaccid arm s/p TBI. Ortho plans to evaluate today due to reports of sling not fitting well.    H/O traumatic brain injury  Cognitive impairment  Paralysis (H)  Lives with her two brothers who are very involved. They have had several conversations with staff about how to care for Wen. She is currently max assist for transfers, sat up at the edge of the bed for 1 minute. BIMS 7/15. Legally blind.    Seizure disorder (H)  Chronic. No witnessed seizures since admission    Slow transit constipation  Patient unable to say if bowels are moving. Nursing has no acute concerns      ALLERGIES: No known drug allergies  Past Medical, Surgical, Family and Social History reviewed and updated in [facility].    Current Outpatient Prescriptions   Medication Sig Dispense Refill     Acetaminophen (TYLENOL PO) Take 975 mg by  "mouth every 6 hours as needed for mild pain or fever       Calcium Carb-Cholecalciferol 600-200 MG-UNIT TABS Take 1 tablet by mouth daily       gabapentin (NEURONTIN) 400 MG capsule TAKE 1 CAPSULE(400 MG) BY MOUTH TWICE DAILY 180 capsule 3     levETIRAcetam (KEPPRA XR) 750 MG 24 hr tablet TAKE 1 TABLET BY MOUTH DAILY 90 tablet 1     LORazepam (ATIVAN) 0.5 MG tablet Take 30 minutes prior to eye visits with injections as needed, may repeat in 1-4 hours as needed. 30 tablet 1     multivitamin, therapeutic with minerals (MULTI-VITAMIN) TABS Take 1 tablet by mouth 2 times daily Preservision AREDS       ORDER FOR DME One pair of knee high compression stockings of medium compression.  Mid-calf circumference is 30cm. 1 each 0     OXYCODONE HCL PO Take 5 mg by mouth every 4 hours as needed       Phenytoin Sodium Extended 200 MG CAPS Take 200 mg by mouth daily 90 capsule 3     polyethylene glycol (MIRALAX/GLYCOLAX) Packet Take 17 g by mouth daily as needed for constipation       Medications reviewed:  Medications reconciled to facility chart and changes were made to reflect current medications as identified as above med list.    REVIEW OF SYSTEMS:  Unobtainable secondary to cognitive impairment.     Physical Exam:  /70  Pulse 89  Temp 97.5  F (36.4  C)  Resp 16  Ht 5' 5\" (1.651 m)  Wt 152 lb 9.6 oz (69.2 kg)  SpO2 94%  BMI 25.39 kg/m2     BP Readin/70  132/63  137/86  136/76  116/78  122/72  130/86  127/76    GENERAL APPEARANCE:  Drowsy, no apparent distress  ENT:  Mouth and posterior oropharynx normal, moist mucous membranes  EYES:  EOM normal, Conjunctiva and lids normal, difficulty opening R eye  NECK:  No adenopathy,masses or thyromegaly  RESP:  respiratory effort and palpation of chest normal, lungs clear to auscultation , no respiratory distress  CV:  Palpation and auscultation of heart done , regular rate and rhythm, no murmur, rub, or gallop  ABDOMEN:  normal bowel sounds, soft, nontender, no " hepatosplenomegaly or other masses  SKIN:  Resolving bruise R temple/forehead, R arm  PSYCH:  oriented to self, insight and judgement impaired, memory impaired       Recent Labs:   CBC RESULTS:   Recent Labs   Lab Test  04/10/18   1647  09/26/17   1603   WBC  6.1  6.9   RBC  4.25  4.21   HGB  13.8  13.7   HCT  42.6  41.9   MCV  100  100   MCH  32.5  32.5   MCHC  32.4  32.7   RDW  12.6  12.7   PLT  210  229       Last Basic Metabolic Panel:  Recent Labs   Lab Test  04/10/18   1647  09/26/17   1603   NA  143  143   POTASSIUM  4.9  4.6   CHLORIDE  112*  110*   MILA  9.7  9.6   CO2  22  22   BUN  19  18   CR  0.71  0.60   GLC  84  75       Liver Function Studies -   Recent Labs   Lab Test  04/10/18   1647  09/26/17   1603   PROTTOTAL  7.5  7.3   ALBUMIN  3.8  3.7   BILITOTAL  0.2  0.2   ALKPHOS  83  85   AST  15  17   ALT  12  19       TSH   Date Value Ref Range Status   03/30/2011 0.77 0.4 - 5.0 mU/L Final   03/29/2011 1.09 0.4 - 5.0 mU/L Final       Lab Results   Component Value Date    A1C 5.0 11/03/2012         Assessment/Plan:  (S42.201S) Closed fracture of proximal end of right humerus, unspecified fracture morphology, sequela  (primary encounter diagnosis)  Comment: Non-surgical treatment. Pain is difficult to assess. It is unknown if she will need to remain in TCU while her fracture heals or if she could go home.  Plan: PT/OT eval and treat, discharge planning per their recommendation. Continue oxycodone prn for pain, monitor pain severity. F/u ortho as scheduled.    (Z87.820) H/O traumatic brain injury  (R41.89) Cognitive impairment  Comment: Mental status currently appears declined from baseline. This may be due to fatigue, medication side effect. She does not appear acutely ill.   Plan: Nursing to monitor mental status. 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 Wheelchair bound. Family provides ADL assistance.  Plan: Continue current POC with no  changes at this time and adjustments as needed.    (G40.909) Seizure disorder (H)  Comment: Chronic condition being managed with medications.  Plan: Continue current POC with no changes at this time and adjustments as needed.    (K59.01) Slow transit constipation  Comment: No acute concerns, will need to monitor while on oxycodone  Plan: Cont Miralax prn. Monitor bowel function. Adjust medication as clinically indicated.      Electronically signed by  MILES Kumar [facility]  Pager: [phone]

## 2018-06-12 ENCOUNTER — RECORDS - HEALTHEAST (OUTPATIENT)
Dept: LAB | Facility: CLINIC | Age: 73
End: 2018-06-12

## 2018-06-13 ENCOUNTER — TRANSFERRED RECORDS (OUTPATIENT)
Dept: HEALTH INFORMATION MANAGEMENT | Facility: CLINIC | Age: 73
End: 2018-06-13

## 2018-06-13 LAB
25(OH)D3 SERPL-MCNC: 28 NG/ML (ref 30–80)
LEVETIRACETAM (KEPPRA): 6.9 UG/ML (ref 6–46)
PHENYTOIN SERPL-MCNC: 5.4 UG/ML (ref 10–20)
TSH SERPL DL<=0.005 MIU/L-ACNC: 3.81 UIU/ML (ref 0.3–5)
TSH SERPL-ACNC: 3.81 UIU/ML (ref 0.3–5)

## 2018-06-14 ENCOUNTER — NURSING HOME VISIT (OUTPATIENT)
Dept: GERIATRICS | Facility: CLINIC | Age: 73
End: 2018-06-14
Payer: COMMERCIAL

## 2018-06-14 VITALS
HEART RATE: 94 BPM | HEIGHT: 64 IN | TEMPERATURE: 97.4 F | DIASTOLIC BLOOD PRESSURE: 67 MMHG | SYSTOLIC BLOOD PRESSURE: 97 MMHG | BODY MASS INDEX: 26.05 KG/M2 | RESPIRATION RATE: 18 BRPM | OXYGEN SATURATION: 94 % | WEIGHT: 152.6 LBS

## 2018-06-14 DIAGNOSIS — S42.201S CLOSED FRACTURE OF PROXIMAL END OF RIGHT HUMERUS, UNSPECIFIED FRACTURE MORPHOLOGY, SEQUELA: Primary | ICD-10-CM

## 2018-06-14 DIAGNOSIS — R41.89 COGNITIVE IMPAIRMENT: ICD-10-CM

## 2018-06-14 DIAGNOSIS — G83.9 PARALYSIS (H): ICD-10-CM

## 2018-06-14 DIAGNOSIS — G40.909 SEIZURE DISORDER (H): ICD-10-CM

## 2018-06-14 DIAGNOSIS — K59.01 SLOW TRANSIT CONSTIPATION: ICD-10-CM

## 2018-06-14 DIAGNOSIS — Z87.820 H/O TRAUMATIC BRAIN INJURY: ICD-10-CM

## 2018-06-14 PROCEDURE — 99309 SBSQ NF CARE MODERATE MDM 30: CPT | Performed by: NURSE PRACTITIONER

## 2018-06-14 NOTE — LETTER
6/14/2018        RE: Ban Petersen  [address]  [facility] [address]        [facility]    Chief Complaint   Patient presents with     RECHECK       [facility] Medical Record Number:  [id number]    HPI:    Ban Pteersen is a 73 year old  (1945), who is being seen today for an episodic care visit at [facility].  [facility] from 5/30/18 through 6/1/18. PMH TBI, cognitive impairment and right sided paralysis admitted on 5/30/18 after mechanical fall at home, found to have right proximal humerus fracture . Fracture was dicussed with ortho service who recommend conservative non-operative management, sling for comfort, and ortho follow up.     Admitted to this facility for  rehab, medical management and nursing care.     HPI information obtained from: facility chart records, facility staff, patient report and [facility] chart review.    Today's concern is:  HPI is challenging, patient is rambling    Closed fracture of proximal end of right humerus, unspecified fracture morphology, sequela  This is her flaccid arm s/p TBI. Taking oxycodone at times for pain. Bruising and swelling improved    H/O traumatic brain injury  Cognitive impairment  Paralysis (H)  Lives with her two brothers who are very involved. They have had several conversations with staff about how to care for Wen. She is currently max assist for transfers and ADLs. BIMS 7/15. Legally blind. She has been happier since her family brought in her large TV. Social work plans on having a care conference to determine if she is basically at baseline and whether she should return home.    Seizure disorder (H)  Chronic. No witnessed seizures since admission    Slow transit constipation  Patient unable to say if bowels are moving. Nursing has no acute concerns      ALLERGIES: No known drug allergies  Past Medical, Surgical, Family and Social History reviewed and updated in [facility].    Current Outpatient  "Prescriptions   Medication Sig Dispense Refill     Acetaminophen (TYLENOL PO) Take 975 mg by mouth every 6 hours as needed for mild pain or fever       Calcium Carb-Cholecalciferol 600-200 MG-UNIT TABS Take 1 tablet by mouth daily       gabapentin (NEURONTIN) 400 MG capsule TAKE 1 CAPSULE(400 MG) BY MOUTH TWICE DAILY 180 capsule 3     levETIRAcetam (KEPPRA XR) 750 MG 24 hr tablet TAKE 1 TABLET BY MOUTH DAILY 90 tablet 1     LORazepam (ATIVAN) 0.5 MG tablet Take 30 minutes prior to eye visits with injections as needed, may repeat in 1-4 hours as needed. 30 tablet 1     multivitamin, therapeutic with minerals (MULTI-VITAMIN) TABS Take 2 tablets by mouth daily Preservision AREDS        ORDER FOR DME One pair of knee high compression stockings of medium compression.  Mid-calf circumference is 30cm. 1 each 0     OXYCODONE HCL PO Take 5 mg by mouth every 4 hours as needed       Phenytoin Sodium Extended 200 MG CAPS Take 200 mg by mouth daily 90 capsule 3     polyethylene glycol (MIRALAX/GLYCOLAX) Packet Take 17 g by mouth daily as needed for constipation       Medications reviewed:  Medications reconciled to facility chart and changes were made to reflect current medications as identified as above med list.    REVIEW OF SYSTEMS:  Unobtainable secondary to cognitive impairment.     Physical Exam:  BP 97/67  Pulse 94  Temp 97.4  F (36.3  C)  Resp 18  Ht 5' 4\" (1.626 m)  Wt 152 lb 9.6 oz (69.2 kg)  SpO2 94%  BMI 26.19 kg/m2     GENERAL APPEARANCE:  Alert, watching TV, no apparent distress  ENT:  Mouth and posterior oropharynx normal, moist mucous membranes  EYES:  EOM normal, Conjunctiva and lids normal, difficulty opening R eye  NECK:  No adenopathy,masses or thyromegaly  RESP:  respiratory effort and palpation of chest normal, lungs clear to auscultation , no respiratory distress  CV:  Palpation and auscultation of heart done , regular rate and rhythm, no murmur, rub, or gallop  ABDOMEN:  normal bowel sounds, soft, " nontender, no hepatosplenomegaly or other masses  SKIN:  Resolving bruises R arm  PSYCH:  oriented to self, insight and judgement impaired, memory impaired       Recent Labs:     CBC RESULTS:   Recent Labs   Lab Test  04/10/18   1647  09/26/17   1603   WBC  6.1  6.9   RBC  4.25  4.21   HGB  13.8  13.7   HCT  42.6  41.9   MCV  100  100   MCH  32.5  32.5   MCHC  32.4  32.7   RDW  12.6  12.7   PLT  210  229       Last Basic Metabolic Panel:  Recent Labs   Lab Test  04/10/18   1647  09/26/17   1603   NA  143  143   POTASSIUM  4.9  4.6   CHLORIDE  112*  110*   MILA  9.7  9.6   CO2  22  22   BUN  19  18   CR  0.71  0.60   GLC  84  75       Liver Function Studies -   Recent Labs   Lab Test  04/10/18   1647  09/26/17   1603   PROTTOTAL  7.5  7.3   ALBUMIN  3.8  3.7   BILITOTAL  0.2  0.2   ALKPHOS  83  85   AST  15  17   ALT  12  19       TSH   Date Value Ref Range Status   06/13/2018 3.81 0.30 - 5.00 uIU/mL Final   03/30/2011 0.77 0.4 - 5.0 mU/L Final     Lab Results   Component Value Date    A1C 5.0 11/03/2012         Assessment/Plan:  (S42.201S) Closed fracture of proximal end of right humerus, unspecified fracture morphology, sequela  (primary encounter diagnosis)  Comment: Non-surgical treatment. Pain is difficult to assess. It is unknown if she will need to remain in TCU while her fracture heals or if she could go home.  Plan: PT/OT eval and treat, discharge planning per their recommendation. Continue oxycodone prn for pain, monitor pain severity. F/u ortho as scheduled. Care conference per social work    (Z87.820) H/O traumatic brain injury  (R41.89) Cognitive impairment  Comment: Mental status likely at baseline, suspect functional status is as well  Plan: Nursing to monitor mental status. 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 Wheelchair bound. Family provides ADL assistance.  Plan: Continue current POC with no changes at this time and adjustments as  needed.    (G40.909) Seizure disorder (H)  Comment: Chronic condition being managed with medications.  Plan: Continue current POC with no changes at this time and adjustments as needed.    (K59.01) Slow transit constipation  Comment: No acute concerns, will need to monitor while on oxycodone  Plan: Cont Miralax prn. Monitor bowel function. Adjust medication as clinically indicated.      Electronically signed by  MILES Kumar [facility]  Pager: [phone]

## 2018-06-19 VITALS
BODY MASS INDEX: 26.05 KG/M2 | HEIGHT: 64 IN | WEIGHT: 152.6 LBS | OXYGEN SATURATION: 94 % | RESPIRATION RATE: 18 BRPM | DIASTOLIC BLOOD PRESSURE: 72 MMHG | HEART RATE: 67 BPM | TEMPERATURE: 98 F | SYSTOLIC BLOOD PRESSURE: 113 MMHG

## 2018-06-20 ENCOUNTER — PATIENT OUTREACH (OUTPATIENT)
Dept: GERIATRIC MEDICINE | Facility: CLINIC | Age: 73
End: 2018-06-20

## 2018-06-20 ENCOUNTER — NURSING HOME VISIT (OUTPATIENT)
Dept: GERIATRICS | Facility: CLINIC | Age: 73
End: 2018-06-20
Payer: COMMERCIAL

## 2018-06-20 DIAGNOSIS — S06.9X9S TRAUMATIC BRAIN INJURY WITH LOSS OF CONSCIOUSNESS, SEQUELA (H): ICD-10-CM

## 2018-06-20 DIAGNOSIS — G40.909 SEIZURE DISORDER (H): ICD-10-CM

## 2018-06-20 DIAGNOSIS — R53.81 PHYSICAL DECONDITIONING: ICD-10-CM

## 2018-06-20 DIAGNOSIS — G62.9 PERIPHERAL POLYNEUROPATHY: ICD-10-CM

## 2018-06-20 DIAGNOSIS — H54.8 LEGAL BLINDNESS: ICD-10-CM

## 2018-06-20 DIAGNOSIS — S42.201S CLOSED FRACTURE OF PROXIMAL END OF RIGHT HUMERUS, UNSPECIFIED FRACTURE MORPHOLOGY, SEQUELA: Primary | ICD-10-CM

## 2018-06-20 PROCEDURE — 99306 1ST NF CARE HIGH MDM 50: CPT | Performed by: INTERNAL MEDICINE
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
## 2018-06-21 ENCOUNTER — NURSING HOME VISIT (OUTPATIENT)
Dept: GERIATRICS | Facility: CLINIC | Age: 73
End: 2018-06-21
Payer: COMMERCIAL

## 2018-06-21 VITALS
HEIGHT: 64 IN | SYSTOLIC BLOOD PRESSURE: 122 MMHG | HEART RATE: 82 BPM | OXYGEN SATURATION: 92 % | RESPIRATION RATE: 18 BRPM | DIASTOLIC BLOOD PRESSURE: 74 MMHG | WEIGHT: 152.6 LBS | BODY MASS INDEX: 26.05 KG/M2 | TEMPERATURE: 97.8 F

## 2018-06-21 DIAGNOSIS — R41.89 COGNITIVE IMPAIRMENT: ICD-10-CM

## 2018-06-21 DIAGNOSIS — G89.4 CHRONIC PAIN SYNDROME: ICD-10-CM

## 2018-06-21 DIAGNOSIS — S42.201S CLOSED FRACTURE OF PROXIMAL END OF RIGHT HUMERUS, UNSPECIFIED FRACTURE MORPHOLOGY, SEQUELA: Primary | ICD-10-CM

## 2018-06-21 DIAGNOSIS — Z87.820 H/O TRAUMATIC BRAIN INJURY: ICD-10-CM

## 2018-06-21 DIAGNOSIS — G83.9 PARALYSIS (H): ICD-10-CM

## 2018-06-21 DIAGNOSIS — K59.01 SLOW TRANSIT CONSTIPATION: ICD-10-CM

## 2018-06-21 DIAGNOSIS — G40.909 SEIZURE DISORDER (H): ICD-10-CM

## 2018-06-21 PROCEDURE — 99309 SBSQ NF CARE MODERATE MDM 30: CPT | Performed by: NURSE PRACTITIONER

## 2018-06-21 NOTE — PROGRESS NOTES
[place] [facility]    Chief Complaint   Patient presents with     ELIZ       [place] Medical Record Number:  [id number]    HPI:    Ban Petersen is a 73 year old  (1945), who is being seen today for an episodic care visit at [facility].  [place] [facility] [place] through 6/1/18. PMH TBI, cognitive impairment and right sided paralysis admitted on 5/30/18 after mechanical fall at home, found to have right proximal humerus fracture . Fracture was dicussed with ortho service who recommend conservative non-operative management, sling for comfort, and ortho follow up.     Admitted to this facility for  rehab, medical management and nursing care.     HPI information obtained from: facility chart records, facility staff, patient report and [facility] chart review.    Today's concern is:  HPI is challenging, patient is rambling    Closed fracture of proximal end of right humerus, unspecified fracture morphology, sequela  This is her flaccid arm s/p TBI. Taking oxycodone at times for pain. Family has requested scheduled pain medications as she was on at home. See chronic pain. Bruising and swelling improved    H/O traumatic brain injury  Cognitive impairment  Paralysis (H)  Lives with her two brothers who are very involved. They have had several conversations with staff about how to care for Wen. She is currently max assist for transfers and ADLs. BIMS 7/15. CPT 3.33. Legally blind. She has been happier since her family brought in her large TV. Social work held a care conference. Family says patient is not back to baseline, she apparently could transfer independently before.    Seizure disorder (H)  Chronic. No witnessed seizures since admission. Dilantin level checked here was 5.4, previous level in clinic was 10.2. She has been on current dilantin dose for some time it appears.    Slow transit constipation  Patient unable to say if bowels are moving. Nursing has no acute  "concerns    Chronic pain syndrome  Per chart review, patient was on scheduled Tylenol #3 at home. Currently her roommate says that patient yells out often. Unknown if this is due to pain.      ALLERGIES: No known drug allergies  Past Medical, Surgical, Family and Social History reviewed and updated in [facility].    Current Outpatient Prescriptions   Medication Sig Dispense Refill     Acetaminophen (TYLENOL PO) Take 975 mg by mouth every 6 hours as needed for mild pain or fever       Calcium Carb-Cholecalciferol 600-200 MG-UNIT TABS Take 1 tablet by mouth daily       gabapentin (NEURONTIN) 400 MG capsule TAKE 1 CAPSULE(400 MG) BY MOUTH TWICE DAILY 180 capsule 3     levETIRAcetam (KEPPRA XR) 750 MG 24 hr tablet TAKE 1 TABLET BY MOUTH DAILY 90 tablet 1     LORazepam (ATIVAN) 0.5 MG tablet Take 30 minutes prior to eye visits with injections as needed, may repeat in 1-4 hours as needed. 30 tablet 1     multivitamin, therapeutic with minerals (MULTI-VITAMIN) TABS Take 1 tablet by mouth 2 times daily Ocuvite       ORDER FOR DME One pair of knee high compression stockings of medium compression.  Mid-calf circumference is 30cm. 1 each 0     OXYCODONE HCL PO Take 5 mg by mouth every 4 hours as needed       Phenytoin Sodium Extended 200 MG CAPS Take 200 mg by mouth daily 90 capsule 3     polyethylene glycol (MIRALAX/GLYCOLAX) Packet Take 17 g by mouth daily as needed for constipation       Medications reviewed:  Medications reconciled to facility chart and changes were made to reflect current medications as identified as above med list.    REVIEW OF SYSTEMS:  Unobtainable secondary to cognitive impairment.     Physical Exam:  /74  Pulse 82  Temp 97.8  F (36.6  C)  Resp 18  Ht 5' 4\" (1.626 m)  Wt 152 lb 9.6 oz (69.2 kg)  SpO2 92%  BMI 26.19 kg/m2     GENERAL APPEARANCE:  Alert, watching TV, no apparent distress  ENT:  Mouth and posterior oropharynx normal, moist mucous membranes  EYES:  EOM normal, Conjunctiva and lids " normal, difficulty opening R eye  NECK:  No adenopathy,masses or thyromegaly  RESP:  respiratory effort and palpation of chest normal, lungs clear to auscultation , no respiratory distress  CV:  Palpation and auscultation of heart done , regular rate and rhythm, no murmur, rub, or gallop  ABDOMEN:  normal bowel sounds, soft, nontender, no hepatosplenomegaly or other masses  SKIN:  Resolving bruises R arm  PSYCH:  oriented to self, insight and judgement impaired, memory impaired       Recent Labs:     CBC RESULTS:   Recent Labs   Lab Test  04/10/18   1647  09/26/17   1603   WBC  6.1  6.9   RBC  4.25  4.21   HGB  13.8  13.7   HCT  42.6  41.9   MCV  100  100   MCH  32.5  32.5   MCHC  32.4  32.7   RDW  12.6  12.7   PLT  210  229       Last Basic Metabolic Panel:  Recent Labs   Lab Test  04/10/18   1647  09/26/17   1603   NA  143  143   POTASSIUM  4.9  4.6   CHLORIDE  112*  110*   MILA  9.7  9.6   CO2  22  22   BUN  19  18   CR  0.71  0.60   GLC  84  75       Liver Function Studies -   Recent Labs   Lab Test  04/10/18   1647  09/26/17   1603   PROTTOTAL  7.5  7.3   ALBUMIN  3.8  3.7   BILITOTAL  0.2  0.2   ALKPHOS  83  85   AST  15  17   ALT  12  19       TSH   Date Value Ref Range Status   06/13/2018 3.81 0.30 - 5.00 uIU/mL Final   03/30/2011 0.77 0.4 - 5.0 mU/L Final         Assessment/Plan:  (S42.201S) Closed fracture of proximal end of right humerus, unspecified fracture morphology, sequela  (primary encounter diagnosis)  Comment: Non-surgical treatment. Pain is difficult to assess. See chronic pain.  Plan: PT/OT eval and treat, discharge planning per their recommendation. F/u ortho as scheduled. Discharge planning per social work    (Z87.820) H/O traumatic brain injury  (R41.89) Cognitive impairment  Comment: Mental status likely at baseline. Goal is to increase independence with transfers then discharge home  Plan: PT/OT eval and treat, discharge planning per their recommendations.    (G83.9) Paralysis (H)  Comment:  Chronic R sided due to TBI. Wheelchair bound. Family provides ADL assistance.  Plan: Continue current POC with no changes at this time and adjustments as needed.    (G40.739) Seizure disorder (H)  Comment: Chronic condition being managed with medications. Will not adjust dose based on labs.  Plan: Continue current POC with no changes at this time and adjustments as needed. F/u PCP    (K59.01) Slow transit constipation  Comment: No acute concerns, suspect this will worsen with scheduled pain medication. Per chart review, at home she was on senna-s 1 tab qhs.  Plan: Senna-S 1 tab qhs. Cont Miralax prn. Monitor bowel function. Adjust medication as clinically indicated.    (G89.4) Chronic pain syndrome  Comment: Per chart review, patient has been on Tylenol #3 for some time. Family indicated that it was scheduled TID even though the noon dose was documented as prn, so would presume they were giving it TID. Agree that she cannot express a need for medication. Based on her previous orders and that it seemed to improve quality of life, will resume Tylenol #3. This will hopefully improve progress with therapy.  Plan: D/C oxycodone. Tylenol #3 2 tabs BID and 1 tab at noon. Monitor for AMS, sedation, constipation    Orders:  D/C oxycodone  Tylenol #3 2 tabs BID and 1 tab at noon  Senna-S 1 tab qhs      Electronically signed by  MILES [facility]  Pager: [phone]

## 2018-06-21 NOTE — LETTER
6/21/2018        RE: Ban Petersen  [address]  [facility] [address]        [place] [facility]    Chief Complaint   Patient presents with     RECHECK       [facility] Medical Record Number:  [id number]    HPI:    Ban Petersen is a 73 year old  (1945), who is being seen today for an episodic care visit at [facility].  Hospital stay was at [facility] from 5/30/18 through 6/1/18. PMH TBI, cognitive impairment and right sided paralysis admitted on 5/30/18 after mechanical fall at home, found to have right proximal humerus fracture . Fracture was dicussed with ortho service who recommend conservative non-operative management, sling for comfort, and ortho follow up.     Admitted to this facility for  rehab, medical management and nursing care.     HPI information obtained from: facility chart records, facility staff, patient report and [facility] chart review.    Today's concern is:  HPI is challenging, patient is rambling    Closed fracture of proximal end of right humerus, unspecified fracture morphology, sequela  This is her flaccid arm s/p TBI. Taking oxycodone at times for pain. Family has requested scheduled pain medications as she was on at home. See chronic pain. Bruising and swelling improved    H/O traumatic brain injury  Cognitive impairment  Paralysis (H)  Lives with her two brothers who are very involved. They have had several conversations with staff about how to care for Wen. She is currently max assist for transfers and ADLs. BIMS 7/15. CPT 3.33. Legally blind. She has been happier since her family brought in her large TV. Social work held a care conference. Family says patient is not back to baseline, she apparently could transfer independently before.    Seizure disorder (H)  Chronic. No witnessed seizures since admission. Dilantin level checked here was 5.4, previous level in clinic was 10.2. She has been on current dilantin dose for some time it appears.    Slow  "transit constipation  Patient unable to say if bowels are moving. Nursing has no acute concerns    Chronic pain syndrome  Per chart review, patient was on scheduled Tylenol #3 at home. Currently her roommate says that patient yells out often. Unknown if this is due to pain.      ALLERGIES: No known drug allergies  Past Medical, Surgical, Family and Social History reviewed and updated in [facility].    Current Outpatient Prescriptions   Medication Sig Dispense Refill     Acetaminophen (TYLENOL PO) Take 975 mg by mouth every 6 hours as needed for mild pain or fever       Calcium Carb-Cholecalciferol 600-200 MG-UNIT TABS Take 1 tablet by mouth daily       gabapentin (NEURONTIN) 400 MG capsule TAKE 1 CAPSULE(400 MG) BY MOUTH TWICE DAILY 180 capsule 3     levETIRAcetam (KEPPRA XR) 750 MG 24 hr tablet TAKE 1 TABLET BY MOUTH DAILY 90 tablet 1     LORazepam (ATIVAN) 0.5 MG tablet Take 30 minutes prior to eye visits with injections as needed, may repeat in 1-4 hours as needed. 30 tablet 1     multivitamin, therapeutic with minerals (MULTI-VITAMIN) TABS Take 1 tablet by mouth 2 times daily Ocuvite       ORDER FOR DME One pair of knee high compression stockings of medium compression.  Mid-calf circumference is 30cm. 1 each 0     OXYCODONE HCL PO Take 5 mg by mouth every 4 hours as needed       Phenytoin Sodium Extended 200 MG CAPS Take 200 mg by mouth daily 90 capsule 3     polyethylene glycol (MIRALAX/GLYCOLAX) Packet Take 17 g by mouth daily as needed for constipation       Medications reviewed:  Medications reconciled to facility chart and changes were made to reflect current medications as identified as above med list.    REVIEW OF SYSTEMS:  Unobtainable secondary to cognitive impairment.     Physical Exam:  /74  Pulse 82  Temp 97.8  F (36.6  C)  Resp 18  Ht 5' 4\" (1.626 m)  Wt 152 lb 9.6 oz (69.2 kg)  SpO2 92%  BMI 26.19 kg/m2     GENERAL APPEARANCE:  Alert, watching TV, no apparent distress  ENT:  Mouth and " posterior oropharynx normal, moist mucous membranes  EYES:  EOM normal, Conjunctiva and lids normal, difficulty opening R eye  NECK:  No adenopathy,masses or thyromegaly  RESP:  respiratory effort and palpation of chest normal, lungs clear to auscultation , no respiratory distress  CV:  Palpation and auscultation of heart done , regular rate and rhythm, no murmur, rub, or gallop  ABDOMEN:  normal bowel sounds, soft, nontender, no hepatosplenomegaly or other masses  SKIN:  Resolving bruises R arm  PSYCH:  oriented to self, insight and judgement impaired, memory impaired       Recent Labs:     CBC RESULTS:   Recent Labs   Lab Test  04/10/18   1647  09/26/17   1603   WBC  6.1  6.9   RBC  4.25  4.21   HGB  13.8  13.7   HCT  42.6  41.9   MCV  100  100   MCH  32.5  32.5   MCHC  32.4  32.7   RDW  12.6  12.7   PLT  210  229       Last Basic Metabolic Panel:  Recent Labs   Lab Test  04/10/18   1647  09/26/17   1603   NA  143  143   POTASSIUM  4.9  4.6   CHLORIDE  112*  110*   MILA  9.7  9.6   CO2  22  22   BUN  19  18   CR  0.71  0.60   GLC  84  75       Liver Function Studies -   Recent Labs   Lab Test  04/10/18   1647  09/26/17   1603   PROTTOTAL  7.5  7.3   ALBUMIN  3.8  3.7   BILITOTAL  0.2  0.2   ALKPHOS  83  85   AST  15  17   ALT  12  19       TSH   Date Value Ref Range Status   06/13/2018 3.81 0.30 - 5.00 uIU/mL Final   03/30/2011 0.77 0.4 - 5.0 mU/L Final         Assessment/Plan:  (S42.201S) Closed fracture of proximal end of right humerus, unspecified fracture morphology, sequela  (primary encounter diagnosis)  Comment: Non-surgical treatment. Pain is difficult to assess. See chronic pain.  Plan: PT/OT eval and treat, discharge planning per their recommendation. F/u ortho as scheduled. Discharge planning per social work    (Z87.820) H/O traumatic brain injury  (R41.89) Cognitive impairment  Comment: Mental status likely at baseline. Goal is to increase independence with transfers then discharge home  Plan: PT/OT  eval and treat, discharge planning per their recommendations.    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 Wheelchair bound. Family provides ADL assistance.  Plan: Continue current POC with no changes at this time and adjustments as needed.    (G40.999) Seizure disorder (H)  Comment: Chronic condition being managed with medications. Will not adjust dose based on labs.  Plan: Continue current POC with no changes at this time and adjustments as needed. F/u PCP    (K59.01) Slow transit constipation  Comment: No acute concerns, suspect this will worsen with scheduled pain medication. Per chart review, at home she was on senna-s 1 tab qhs.  Plan: Senna-S 1 tab qhs. Cont Miralax prn. Monitor bowel function. Adjust medication as clinically indicated.    (G89.4) Chronic pain syndrome  Comment: Per chart review, patient has been on Tylenol #3 for some time. Family indicated that it was scheduled TID even though the noon dose was documented as prn, so would presume they were giving it TID. Agree that she cannot express a need for medication. Based on her previous orders and that it seemed to improve quality of life, will resume Tylenol #3. This will hopefully improve progress with therapy.  Plan: D/C oxycodone. Tylenol #3 2 tabs BID and 1 tab at noon. Monitor for AMS, sedation, constipation    Orders:  D/C oxycodone  Tylenol #3 2 tabs BID and 1 tab at noon  Senna-S 1 tab qhs      Electronically signed by  MILES Kumar [facility]  Pager: [phone]

## 2018-06-21 NOTE — PROGRESS NOTES
[facility] Care Coordination Contact  CM attended care conference at [facility] for client. OT, SW and RN present at the CC. Client's niece Sandra, two nephews and brother also present. Client's brother Johnson stated he will be her new private guardian her brother Pavel is no longer able to act as guardian. In the CC, many concerns of care for client were brought up by client's niece. Staff will address these concerns. Client still needs 2-3 weeks of therapy in order to gain strength in order to go home with PCA care. CM to continue to follow.   Hillary Romano RN  [facility]   [phone]

## 2018-07-02 ENCOUNTER — NURSING HOME VISIT (OUTPATIENT)
Dept: GERIATRICS | Facility: CLINIC | Age: 73
End: 2018-07-02
Payer: COMMERCIAL

## 2018-07-02 VITALS
SYSTOLIC BLOOD PRESSURE: 131 MMHG | HEART RATE: 76 BPM | DIASTOLIC BLOOD PRESSURE: 65 MMHG | BODY MASS INDEX: 26.19 KG/M2 | OXYGEN SATURATION: 95 % | TEMPERATURE: 98.3 F | WEIGHT: 152.6 LBS | RESPIRATION RATE: 18 BRPM

## 2018-07-02 DIAGNOSIS — G40.909 SEIZURE DISORDER (H): ICD-10-CM

## 2018-07-02 DIAGNOSIS — R41.89 COGNITIVE IMPAIRMENT: ICD-10-CM

## 2018-07-02 DIAGNOSIS — S06.9X9S TRAUMATIC BRAIN INJURY WITH LOSS OF CONSCIOUSNESS, SEQUELA (H): ICD-10-CM

## 2018-07-02 DIAGNOSIS — S42.201D CLOSED FRACTURE OF PROXIMAL END OF RIGHT HUMERUS WITH ROUTINE HEALING, UNSPECIFIED FRACTURE MORPHOLOGY, SUBSEQUENT ENCOUNTER: Primary | ICD-10-CM

## 2018-07-02 PROCEDURE — 99309 SBSQ NF CARE MODERATE MDM 30: CPT | Performed by: INTERNAL MEDICINE

## 2018-07-02 RX ORDER — AMOXICILLIN 250 MG
1 CAPSULE ORAL DAILY
COMMUNITY
End: 2020-02-11 | Stop reason: CLARIF

## 2018-07-02 NOTE — PROGRESS NOTES
[place] [facility]  July 2, 2018      Chief Complaint   Patient presents with     RECHECK       HPI:    Ban Petersen is a 73 year old  (1945), who is being seen today for an episodic care visit at [facility] where she is in the TCU after a R humerus fracture secondary to a fall. She has a history of a remote TBI with resultant cognitive impairment and R sided hemiparesis. She lives with her brothers and is at TCU to with goal to become strong enough to return home. History is limited due to her TBI; however, she denies pain in her R arm (this is a change from my last visit). No concerns per nursing. Working with therapies.     ALLERGIES: No known drug allergies    Past Medical, Surgical, Family and Social History reviewed and updated in [employer].    Current Outpatient Prescriptions   Medication Sig Dispense Refill     Acetaminophen (TYLENOL PO) Take 975 mg by mouth every 6 hours as needed for mild pain or fever       acetaminophen-codeine (TYLENOL #3) 300-30 MG per tablet Take 2 tablets by mouth 2 times daily And 1 tab at noon 10 tablet 0     Calcium Carb-Cholecalciferol 600-200 MG-UNIT TABS Take 1 tablet by mouth daily       gabapentin (NEURONTIN) 400 MG capsule TAKE 1 CAPSULE(400 MG) BY MOUTH TWICE DAILY 180 capsule 3     levETIRAcetam (KEPPRA XR) 750 MG 24 hr tablet TAKE 1 TABLET BY MOUTH DAILY 90 tablet 1     LORazepam (ATIVAN) 0.5 MG tablet Take 30 minutes prior to eye visits with injections as needed, may repeat in 1-4 hours as needed. 30 tablet 1     multivitamin, therapeutic with minerals (MULTI-VITAMIN) TABS Take 1 tablet by mouth 2 times daily Ocuvite       ORDER FOR DME One pair of knee high compression stockings of medium compression.  Mid-calf circumference is 30cm. 1 each 0     Phenytoin Sodium Extended 200 MG CAPS Take 200 mg by mouth daily 90 capsule 3     polyethylene glycol (MIRALAX/GLYCOLAX) Packet Take 17 g by mouth daily as needed for constipation       senna-docusate  (SENOKOT-S;PERICOLACE) 8.6-50 MG per tablet Take 1 tablet by mouth daily       Medications reviewed:  Medications reconciled to facility chart and changes were made to reflect current medications as identified as above med list. Below are the changes that were made:   Medications stopped since last EPIC medication reconciliation:   There are no discontinued medications.  Medications started since last [facility] medication reconciliation:  Orders Placed This Encounter   Medications     senna-docusate (SENOKOT-S;PERICOLACE) 8.6-50 MG per tablet     Sig: Take 1 tablet by mouth daily     REVIEW OF SYSTEMS:  Unable to be obtained due to cognitive impairment.     PHYSICAL EXAM:  /65  Pulse 76  Temp 98.3  F (36.8  C)  Resp 18  Wt 152 lb 9.6 oz (69.2 kg)  SpO2 95%  BMI 26.19 kg/m2  Gen: sitting up in bed watching TV, alert, cooperative and in no acute distress  Resp: breathing non-labored  GI: abdomen soft, not-tender  Ext: no LE edema; LUE in sling  Neuro: CX II-XII grossly in tact; R sided hemiparesis   Psych: memory, judgement and insight impaired.     Recent Labs:  Reviewed as per Epic    ASSESSMENT/PLAN:    Proximal Right Humerus Fracture (5/30/18)   Secondary to a fall. Non-op management. This arm is flaccid after TBI. Pain control has been difficult to achieve given underlying chronic pain, but now appears to be well managed  -- sling to JOSE MANUEL, NWB  -- continues on APAP-codine 300-30 mg 2 tabs BID and 1 tab at noon  -- PT/OT  -- follow up with ortho as scheduled    TBI / Cognitive Impairment   Pleasant. Oriented to self. Lives with her brothers. BIMS 7/15. CPT 3.3/5.6. Goal is to increase independence with transfers so she can return home with them.   -- ongoing supportive cares    Seizure Disorder  Secondary to TBI. No seizures at TCU.  -- continues on levetiracetam 750 mg daily and phenytoin 200 mg daily    Electronically signed by  Gina Durham MD

## 2018-07-02 NOTE — LETTER
7/2/2018        RE: Ban Petersen  [address]  [facility] [address]        [place] [facility]  July 2, 2018      Chief Complaint   Patient presents with     RECHECK       HPI:    Ban Petersen is a 73 year old  (1945), who is being seen today for an episodic care visit at [facility] where she is in the TCU after a R humerus fracture secondary to a fall. She has a history of a remote TBI with resultant cognitive impairment and R sided hemiparesis. She lives with her brothers and is at TCU to with goal to become strong enough to return home. History is limited due to her TBI; however, she denies pain in her R arm (this is a change from my last visit). No concerns per nursing. Working with therapies.     ALLERGIES: No known drug allergies    Past Medical, Surgical, Family and Social History reviewed and updated in [facility].    Current Outpatient Prescriptions   Medication Sig Dispense Refill     Acetaminophen (TYLENOL PO) Take 975 mg by mouth every 6 hours as needed for mild pain or fever       acetaminophen-codeine (TYLENOL #3) 300-30 MG per tablet Take 2 tablets by mouth 2 times daily And 1 tab at noon 10 tablet 0     Calcium Carb-Cholecalciferol 600-200 MG-UNIT TABS Take 1 tablet by mouth daily       gabapentin (NEURONTIN) 400 MG capsule TAKE 1 CAPSULE(400 MG) BY MOUTH TWICE DAILY 180 capsule 3     levETIRAcetam (KEPPRA XR) 750 MG 24 hr tablet TAKE 1 TABLET BY MOUTH DAILY 90 tablet 1     LORazepam (ATIVAN) 0.5 MG tablet Take 30 minutes prior to eye visits with injections as needed, may repeat in 1-4 hours as needed. 30 tablet 1     multivitamin, therapeutic with minerals (MULTI-VITAMIN) TABS Take 1 tablet by mouth 2 times daily Ocuvite       ORDER FOR DME One pair of knee high compression stockings of medium compression.  Mid-calf circumference is 30cm. 1 each 0     Phenytoin Sodium Extended 200 MG CAPS Take 200 mg by mouth daily 90 capsule 3     polyethylene glycol  (MIRALAX/GLYCOLAX) Packet Take 17 g by mouth daily as needed for constipation       senna-docusate (SENOKOT-S;PERICOLACE) 8.6-50 MG per tablet Take 1 tablet by mouth daily       Medications reviewed:  Medications reconciled to facility chart and changes were made to reflect current medications as identified as above med list. Below are the changes that were made:   Medications stopped since last EPIC medication reconciliation:   There are no discontinued medications.  Medications started since last [facility] medication reconciliation:  Orders Placed This Encounter   Medications     senna-docusate (SENOKOT-S;PERICOLACE) 8.6-50 MG per tablet     Sig: Take 1 tablet by mouth daily     REVIEW OF SYSTEMS:  Unable to be obtained due to cognitive impairment.     PHYSICAL EXAM:  /65  Pulse 76  Temp 98.3  F (36.8  C)  Resp 18  Wt 152 lb 9.6 oz (69.2 kg)  SpO2 95%  BMI 26.19 kg/m2  Gen: sitting up in bed watching TV, alert, cooperative and in no acute distress  Resp: breathing non-labored  GI: abdomen soft, not-tender  Ext: no LE edema; LUE in sling  Neuro: CX II-XII grossly in tact; R sided hemiparesis   Psych: memory, judgement and insight impaired.     Recent Labs:  Reviewed as per Epic    ASSESSMENT/PLAN:    Proximal Right Humerus Fracture (5/30/18)   Secondary to a fall. Non-op management. This arm is flaccid after TBI. Pain control has been difficult to achieve given underlying chronic pain, but now appears to be well managed  -- sling to JOSE MANUEL NWOLGA  -- continues on APAP-codine 300-30 mg 2 tabs BID and 1 tab at noon  -- PT/OT  -- follow up with ortho as scheduled    TBI / Cognitive Impairment   Pleasant. Oriented to self. Lives with her brothers. BIMS 7/15. CPT 3.3/5.6. Goal is to increase independence with transfers so she can return home with them.   -- ongoing supportive cares    Seizure Disorder  Secondary to TBI. No seizures at TCU.  -- continues on levetiracetam 750 mg daily and phenytoin 200 mg  daily    Electronically signed by  Gina Durham MD

## 2018-07-09 ENCOUNTER — NURSING HOME VISIT (OUTPATIENT)
Dept: GERIATRICS | Facility: CLINIC | Age: 73
End: 2018-07-09
Payer: COMMERCIAL

## 2018-07-09 VITALS
TEMPERATURE: 97.4 F | WEIGHT: 151 LBS | SYSTOLIC BLOOD PRESSURE: 100 MMHG | BODY MASS INDEX: 25.16 KG/M2 | RESPIRATION RATE: 20 BRPM | OXYGEN SATURATION: 94 % | HEIGHT: 65 IN | DIASTOLIC BLOOD PRESSURE: 68 MMHG | HEART RATE: 56 BPM

## 2018-07-09 DIAGNOSIS — G40.909 SEIZURE DISORDER (H): ICD-10-CM

## 2018-07-09 DIAGNOSIS — G89.4 CHRONIC PAIN SYNDROME: ICD-10-CM

## 2018-07-09 DIAGNOSIS — R41.89 COGNITIVE IMPAIRMENT: ICD-10-CM

## 2018-07-09 DIAGNOSIS — K59.01 SLOW TRANSIT CONSTIPATION: ICD-10-CM

## 2018-07-09 DIAGNOSIS — S06.9X9S TRAUMATIC BRAIN INJURY WITH LOSS OF CONSCIOUSNESS, SEQUELA (H): ICD-10-CM

## 2018-07-09 DIAGNOSIS — S42.201D CLOSED FRACTURE OF PROXIMAL END OF RIGHT HUMERUS WITH ROUTINE HEALING, UNSPECIFIED FRACTURE MORPHOLOGY, SUBSEQUENT ENCOUNTER: Primary | ICD-10-CM

## 2018-07-09 DIAGNOSIS — G83.9 PARALYSIS (H): ICD-10-CM

## 2018-07-09 PROCEDURE — 99309 SBSQ NF CARE MODERATE MDM 30: CPT | Performed by: NURSE PRACTITIONER

## 2018-07-09 NOTE — LETTER
7/9/2018        RE: Ban Petersen  [address]  [facility] [address]        [place] [facility]    Chief Complaint   Patient presents with     longterm Acute       [place] Medical Record Number:  [id number]    HPI:    Ban Petersen is a 73 year old  (1945), who is being seen today for an episodic care visit at [facility].  Hospital stay was at [facility] from 5/30/18 through 6/1/18. PMH TBI, cognitive impairment and right sided paralysis admitted on 5/30/18 after mechanical fall at home, found to have right proximal humerus fracture . Fracture was dicussed with ortho service who recommend conservative non-operative management, sling for comfort, and ortho follow up.     Admitted to this facility for  rehab, medical management and nursing care.     HPI information obtained from: facility chart records, facility staff, patient report and [facility] chart review.    Today's concern is:  HPI is challenging, patient is rambling    Closed fracture of proximal end of right humerus, unspecified fracture morphology, sequela  This is her flaccid arm s/p TBI. See chronic pain. Bruising and swelling improved    H/O traumatic brain injury  Cognitive impairment  Paralysis (H)  Lives with her two brothers who are very involved. They have had several conversations with staff about how to care for Wen. She is currently max assist for transfers and ADLs. BIMS 7/15. CPT 3.33. Legally blind. She has been happier since her family brought in her large TV. Social work held a care conference. Family says patient is not back to baseline, she apparently could transfer independently before.    Seizure disorder (H)  Chronic. No witnessed seizures since admission. Dilantin level checked here was 5.4, previous level in clinic was 10.2. She has been on current dilantin dose for some time it appears.    Slow transit constipation  Patient unable to say if bowels are moving. Nursing has no acute  concerns    Chronic pain syndrome  Family had requested scheduled pain medication. Per chart review, patient was on scheduled Tylenol #3 at home. This was restarted 6/21/18. Nursing reports improved pain, no complaints/yelling out from patient as before. Sleeping well. No sedation noted.      ALLERGIES: No known drug allergies  Past Medical, Surgical, Family and Social History reviewed and updated in [facility].    Current Outpatient Prescriptions   Medication Sig Dispense Refill     Acetaminophen (TYLENOL PO) Take 975 mg by mouth every 6 hours as needed for mild pain or fever       acetaminophen-codeine (TYLENOL #3) 300-30 MG per tablet Take 2 tablets by mouth 2 times daily And 1 tab at noon 10 tablet 0     Calcium Carb-Cholecalciferol 600-200 MG-UNIT TABS Take 1 tablet by mouth daily       gabapentin (NEURONTIN) 400 MG capsule TAKE 1 CAPSULE(400 MG) BY MOUTH TWICE DAILY 180 capsule 3     levETIRAcetam (KEPPRA XR) 750 MG 24 hr tablet TAKE 1 TABLET BY MOUTH DAILY 90 tablet 1     LORazepam (ATIVAN) 0.5 MG tablet Take 30 minutes prior to eye visits with injections as needed, may repeat in 1-4 hours as needed. 30 tablet 1     multivitamin, therapeutic with minerals (MULTI-VITAMIN) TABS Take 1 tablet by mouth 2 times daily Ocuvite       ORDER FOR DME One pair of knee high compression stockings of medium compression.  Mid-calf circumference is 30cm. 1 each 0     Phenytoin Sodium Extended 200 MG CAPS Take 200 mg by mouth daily 90 capsule 3     polyethylene glycol (MIRALAX/GLYCOLAX) Packet Take 17 g by mouth daily as needed for constipation       senna-docusate (SENOKOT-S;PERICOLACE) 8.6-50 MG per tablet Take 1 tablet by mouth daily       Medications reviewed:  Medications reconciled to facility chart and changes were made to reflect current medications as identified as above med list.     REVIEW OF SYSTEMS:  Unobtainable secondary to cognitive impairment.     Physical Exam:  /68  Pulse 56  Temp 97.4  F (36.3  C)  Resp  "20  Ht 5' 5\" (1.651 m)  Wt 151 lb (68.5 kg)  SpO2 94%  BMI 25.13 kg/m2   GENERAL APPEARANCE:  Alert, watching TV, no apparent distress  ENT:  Mouth and posterior oropharynx normal, moist mucous membranes  EYES:  EOM normal, Conjunctiva and lids normal, difficulty opening R eye  NECK:  No adenopathy,masses or thyromegaly  RESP:  respiratory effort and palpation of chest normal, lungs clear to auscultation , no respiratory distress  CV:  Palpation and auscultation of heart done , regular rate and rhythm, no murmur, rub, or gallop  ABDOMEN:  normal bowel sounds, soft, nontender, no hepatosplenomegaly or other masses  SKIN:  Resolving bruises R arm  PSYCH:  oriented to self, insight and judgement impaired, memory impaired       Recent Labs:     CBC RESULTS:   Recent Labs   Lab Test  04/10/18   1647  09/26/17   1603   WBC  6.1  6.9   RBC  4.25  4.21   HGB  13.8  13.7   HCT  42.6  41.9   MCV  100  100   MCH  32.5  32.5   MCHC  32.4  32.7   RDW  12.6  12.7   PLT  210  229       Last Basic Metabolic Panel:  Recent Labs   Lab Test  04/10/18   1647  09/26/17   1603   NA  143  143   POTASSIUM  4.9  4.6   CHLORIDE  112*  110*   MILA  9.7  9.6   CO2  22  22   BUN  19  18   CR  0.71  0.60   GLC  84  75       Liver Function Studies -   Recent Labs   Lab Test  04/10/18   1647  09/26/17   1603   PROTTOTAL  7.5  7.3   ALBUMIN  3.8  3.7   BILITOTAL  0.2  0.2   ALKPHOS  83  85   AST  15  17   ALT  12  19       TSH   Date Value Ref Range Status   06/13/2018 3.81 0.30 - 5.00 uIU/mL Final   03/30/2011 0.77 0.4 - 5.0 mU/L Final       Assessment/Plan:  (S42.201S) Closed fracture of proximal end of right humerus, unspecified fracture morphology, sequela  (primary encounter diagnosis)  Comment: Non-surgical treatment.   Plan: PT/OT eval and treat, discharge planning per their recommendation. F/u ortho as scheduled. Discharge planning per social work    (Z87.820) H/O traumatic brain injury  (R41.89) Cognitive impairment  Comment: Mental " status likely at baseline. Goal is to increase independence with transfers then discharge home  Plan: PT/OT eval and treat, discharge planning per their recommendations.    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 Wheelchair bound. Family provides ADL assistance.  Plan: Continue current POC with no changes at this time and adjustments as needed.    (G40.909) Seizure disorder (H)  Comment: Chronic condition being managed with medications.  Plan: Continue current POC with no changes at this time and adjustments as needed. F/u PCP    (K59.01) Slow transit constipation  Comment: Chronic condition being managed with medications.  Plan: Cont Senna-S 1 tab qhs. Cont Miralax prn. Monitor bowel function. Adjust medication as clinically indicated.    (G89.4) Chronic pain syndrome  Comment: Per chart review, patient has been on Tylenol #3 for some time. Family indicated that it was scheduled TID even though the noon dose was documented as prn, so would presume they were giving it TID. Agree that she cannot express a need for medication.   Plan: Cont Tylenol #3 2 tabs BID and 1 tab at noon. Monitor for AMS, sedation, constipation        Electronically signed by  MILES Kumar [facility]  Pager: [phone]

## 2018-07-12 ENCOUNTER — DISCHARGE SUMMARY NURSING HOME (OUTPATIENT)
Dept: GERIATRICS | Facility: CLINIC | Age: 73
End: 2018-07-12
Payer: COMMERCIAL

## 2018-07-12 VITALS
BODY MASS INDEX: 25.86 KG/M2 | HEART RATE: 62 BPM | WEIGHT: 151.5 LBS | DIASTOLIC BLOOD PRESSURE: 67 MMHG | TEMPERATURE: 96.9 F | SYSTOLIC BLOOD PRESSURE: 116 MMHG | OXYGEN SATURATION: 92 % | HEIGHT: 64 IN | RESPIRATION RATE: 16 BRPM

## 2018-07-12 DIAGNOSIS — G89.4 CHRONIC PAIN SYNDROME: ICD-10-CM

## 2018-07-12 DIAGNOSIS — G83.9 PARALYSIS (H): ICD-10-CM

## 2018-07-12 DIAGNOSIS — Z87.820 H/O TRAUMATIC BRAIN INJURY: ICD-10-CM

## 2018-07-12 DIAGNOSIS — K59.01 SLOW TRANSIT CONSTIPATION: ICD-10-CM

## 2018-07-12 DIAGNOSIS — S42.201S CLOSED FRACTURE OF PROXIMAL END OF RIGHT HUMERUS, UNSPECIFIED FRACTURE MORPHOLOGY, SEQUELA: Primary | ICD-10-CM

## 2018-07-12 DIAGNOSIS — G40.909 SEIZURE DISORDER (H): ICD-10-CM

## 2018-07-12 DIAGNOSIS — R41.89 COGNITIVE IMPAIRMENT: ICD-10-CM

## 2018-07-12 PROCEDURE — 99316 NF DSCHRG MGMT 30 MIN+: CPT | Performed by: NURSE PRACTITIONER

## 2018-07-12 NOTE — LETTER
7/12/2018        RE: Ban Petersen  [address]  [facility] [address]          [place] [facility] DISCHARGE SUMMARY    PATIENT'S NAME: Ban Petersen  YOB: 1945  MEDICAL RECORD NUMBER:  [id number]    PRIMARY CARE PROVIDER AND CLINIC RESPONSIBLE AFTER TRANSFER: Wegener, Joel Daniel Corbin [address] / [facility] [address]     CODE STATUS/ADVANCE DIRECTIVES DISCUSSION:   CPR/Full code        Allergies   Allergen Reactions     No Known Drug Allergies        TRANSFERRING PROVIDERS: MILES Kumar CNP, Gina Durham MD  DATE OF SNF ADMISSION:  June / 01 / 2018  DATE OF SNF (anticipated) DISCHARGE: July / 13 / 2018  [place] [facility] [place]   [facility]ursing Facility: [facility] stay 5/30/18 to 6/1/18.     Condition on Discharge:  Improving.  Function:  Supervision to min assist for ADLs, transfers  Cognitive Scores: BIMS 08/15 and PHQ9 00/27.    Equipment: none needed    DISCHARGE DIAGNOSIS:   1. Closed fracture of proximal end of right humerus, unspecified fracture morphology, sequela    2. H/O traumatic brain injury    3. Cognitive impairment    4. Paralysis (H)    5. Seizure disorder (H)    6. Slow transit constipation    7. Chronic pain syndrome        HPI Nursing Facility Course:  HPI information obtained from: facility chart records, facility staff and [facility] chart review.    Hospital stay was at [facility] from 5/30/18 through 6/1/18. PMH TBI, cognitive impairment and right sided paralysis admitted on 5/30/18 after mechanical fall at home, found to have right proximal humerus fracture . Fracture was dicussed with ortho service who recommend conservative non-operative management, sling for comfort, and ortho follow up.     HPI is challenging, patient is rambling    Closed fracture of proximal end of right humerus, unspecified fracture morphology, sequela  This is her flaccid arm s/p TBI. Bruising and swelling resolved.    H/O traumatic  brain injury  Cognitive impairment  Paralysis (H)  Lives with her two brothers who are very involved. She is currently supervision to min assist for transfers and ADLs. This depends on her willingness to participate, alertness level. BIMS 7/15. CPT 3.33. Legally blind.     Seizure disorder (H)  Chronic. No witnessed seizures since admission. Dilantin level checked here was 5.4, previous level in clinic was 10.2. She has been on current dilantin dose for some time it appears. No changes were made to her dose.    Slow transit constipation  Patient unable to say if bowels are moving. Nursing has no acute concerns    Chronic pain syndrome  Family had requested scheduled pain medication. Per chart review, patient was on scheduled Tylenol #3 at home. This was restarted 6/21/18. Nursing reports improved pain, no complaints/yelling out from patient as before. Sleeping well. No sedation noted.        PAST MEDICAL HISTORY:  has a past medical history of Hemiplegia, unspecified, affecting unspecified side; Other convulsions; Seizure (H); and Seizures (H). She also has no past medical history of Malignant hyperthermia or PONV (postoperative nausea and vomiting).    DISCHARGE MEDICATIONS:  Current Outpatient Prescriptions   Medication Sig Dispense Refill     Acetaminophen (TYLENOL PO) Take 975 mg by mouth every 6 hours as needed for mild pain or fever       acetaminophen-codeine (TYLENOL #3) 300-30 MG per tablet Take 2 tablets by mouth 2 times daily And 1 tab at noon 10 tablet 0     Calcium Carb-Cholecalciferol 600-200 MG-UNIT TABS Take 1 tablet by mouth daily       gabapentin (NEURONTIN) 400 MG capsule TAKE 1 CAPSULE(400 MG) BY MOUTH TWICE DAILY 180 capsule 3     levETIRAcetam (KEPPRA XR) 750 MG 24 hr tablet TAKE 1 TABLET BY MOUTH DAILY 90 tablet 1     LORazepam (ATIVAN) 0.5 MG tablet Take 30 minutes prior to eye visits with injections as needed, may repeat in 1-4 hours as needed. 30 tablet 1     multivitamin, therapeutic with  "minerals (MULTI-VITAMIN) TABS Take 1 tablet by mouth 2 times daily Ocuvite       ORDER FOR DME One pair of knee high compression stockings of medium compression.  Mid-calf circumference is 30cm. 1 each 0     Phenytoin Sodium Extended 200 MG CAPS Take 200 mg by mouth daily 90 capsule 3     polyethylene glycol (MIRALAX/GLYCOLAX) Packet Take 17 g by mouth daily as needed for constipation       senna-docusate (SENOKOT-S;PERICOLACE) 8.6-50 MG per tablet Take 1 tablet by mouth daily         MEDICATION CHANGES/RATIONALE:   Oxycodone stopped  Previous Tylenol #3 orders started    Controlled medications sent with patient:   Remaining tablets of Tylenol #3 to be sent     ROS:    Unobtainable secondary to cognitive impairment.     Physical Exam:   Vitals: /67  Pulse 62  Temp 96.9  F (36.1  C)  Resp 16  Ht 5' 4\" (1.626 m)  Wt 151 lb 8 oz (68.7 kg)  SpO2 92%  BMI 26 kg/m2  BMI= Body mass index is 26 kg/(m^2).  GENERAL APPEARANCE:  Alert, watching TV, no apparent distress  ENT:  Mouth and posterior oropharynx normal, moist mucous membranes  EYES:  EOM normal, Conjunctiva and lids normal, difficulty opening R eye  NECK:  No adenopathy,masses or thyromegaly  RESP:  respiratory effort and palpation of chest normal, lungs clear to auscultation , no respiratory distress  CV:  Palpation and auscultation of heart done , regular rate and rhythm, no murmur, rub, or gallop  ABDOMEN:  normal bowel sounds, soft, nontender, no hepatosplenomegaly or other masses  SKIN:  Bruises R arm resolved  PSYCH:  oriented to self, insight and judgement impaired, memory impaired       DISCHARGE PLAN:  Home Health Aide and From:  [facility]  Patient instructed to follow-up with:  PCP in 7 days      [facility] scheduled appointments:  Future Appointments  Date Time Provider Department Center   5/5/2050 12:15 AM [address] referral needed and placed by this provider: No    Pending labs: none    SNF labs "     CBC RESULTS:   Recent Labs   Lab Test  04/10/18   1647  09/26/17   1603   WBC  6.1  6.9   RBC  4.25  4.21   HGB  13.8  13.7   HCT  42.6  41.9   MCV  100  100   MCH  32.5  32.5   MCHC  32.4  32.7   RDW  12.6  12.7   PLT  210  229       Last Basic Metabolic Panel:  Recent Labs   Lab Test  04/10/18   1647  09/26/17   1603   NA  143  143   POTASSIUM  4.9  4.6   CHLORIDE  112*  110*   MILA  9.7  9.6   CO2  22  22   BUN  19  18   CR  0.71  0.60   GLC  84  75       Liver Function Studies -   Recent Labs   Lab Test  04/10/18   1647  09/26/17   1603   PROTTOTAL  7.5  7.3   ALBUMIN  3.8  3.7   BILITOTAL  0.2  0.2   ALKPHOS  83  85   AST  15  17   ALT  12  19       TSH   Date Value Ref Range Status   06/13/2018 3.81 0.30 - 5.00 uIU/mL Final   03/30/2011 0.77 0.4 - 5.0 mU/L Final       Lab Results   Component Value Date    A1C 5.0 11/03/2012       Discharge Treatments: none    TOTAL DISCHARGE TIME:   Greater than 30 minutes     Electronically signed by:  MILES Kumar [facility]  Pager: [phone]

## 2018-07-12 NOTE — PROGRESS NOTES
[place] [facility] DISCHARGE SUMMARY    PATIENT'S NAME: Ban Petersen  YOB: 1945  MEDICAL RECORD NUMBER:  [id number]    PRIMARY CARE PROVIDER AND CLINIC RESPONSIBLE AFTER TRANSFER: Wegener, Joel Daniel Irwin [address] [place] [id number]     CODE STATUS/ADVANCE DIRECTIVES DISCUSSION:   CPR/Full code        Allergies   Allergen Reactions     No Known Drug Allergies        TRANSFERRING PROVIDERS: MILES Kumar CNP, Gina Durham MD  DATE OF SNF ADMISSION:  June / 01 / 2018  DATE OF SNF (anticipated) DISCHARGE: July / 13 / 2018  DISCHARGE DISPOSITION: [facility] Provider   Nursing Facility: [facility] stay 5/30/18 to 6/1/18.     Condition on Discharge:  Improving.  Function:  Supervision to min assist for ADLs, transfers  Cognitive Scores: BIMS 08/15 and PHQ9 00/27.    Equipment: none needed    DISCHARGE DIAGNOSIS:   1. Closed fracture of proximal end of right humerus, unspecified fracture morphology, sequela    2. H/O traumatic brain injury    3. Cognitive impairment    4. Paralysis (H)    5. Seizure disorder (H)    6. Slow transit constipation    7. Chronic pain syndrome        HPI Nursing Facility Course:  HPI information obtained from: facility chart records, facility staff and [facility] chart review.    Hospital stay was at [facility] from 5/30/18 through 6/1/18. PMH TBI, cognitive impairment and right sided paralysis admitted on 5/30/18 after mechanical fall at home, found to have right proximal humerus fracture . Fracture was dicussed with ortho service who recommend conservative non-operative management, sling for comfort, and ortho follow up.     HPI is challenging, patient is rambling    Closed fracture of proximal end of right humerus, unspecified fracture morphology, sequela  This is her flaccid arm s/p TBI. Bruising and swelling resolved.    H/O traumatic brain injury  Cognitive impairment  Paralysis (H)  Lives with her two brothers who are very  involved. She is currently supervision to min assist for transfers and ADLs. This depends on her willingness to participate, alertness level. BIMS 7/15. CPT 3.33. Legally blind.     Seizure disorder (H)  Chronic. No witnessed seizures since admission. Dilantin level checked here was 5.4, previous level in clinic was 10.2. She has been on current dilantin dose for some time it appears. No changes were made to her dose.    Slow transit constipation  Patient unable to say if bowels are moving. Nursing has no acute concerns    Chronic pain syndrome  Family had requested scheduled pain medication. Per chart review, patient was on scheduled Tylenol #3 at home. This was restarted 6/21/18. Nursing reports improved pain, no complaints/yelling out from patient as before. Sleeping well. No sedation noted.        PAST MEDICAL HISTORY:  has a past medical history of Hemiplegia, unspecified, affecting unspecified side; Other convulsions; Seizure (H); and Seizures (H). She also has no past medical history of Malignant hyperthermia or PONV (postoperative nausea and vomiting).    DISCHARGE MEDICATIONS:  Current Outpatient Prescriptions   Medication Sig Dispense Refill     Acetaminophen (TYLENOL PO) Take 975 mg by mouth every 6 hours as needed for mild pain or fever       acetaminophen-codeine (TYLENOL #3) 300-30 MG per tablet Take 2 tablets by mouth 2 times daily And 1 tab at noon 10 tablet 0     Calcium Carb-Cholecalciferol 600-200 MG-UNIT TABS Take 1 tablet by mouth daily       gabapentin (NEURONTIN) 400 MG capsule TAKE 1 CAPSULE(400 MG) BY MOUTH TWICE DAILY 180 capsule 3     levETIRAcetam (KEPPRA XR) 750 MG 24 hr tablet TAKE 1 TABLET BY MOUTH DAILY 90 tablet 1     LORazepam (ATIVAN) 0.5 MG tablet Take 30 minutes prior to eye visits with injections as needed, may repeat in 1-4 hours as needed. 30 tablet 1     multivitamin, therapeutic with minerals (MULTI-VITAMIN) TABS Take 1 tablet by mouth 2 times daily Ocuvite       ORDER FOR  "DME One pair of knee high compression stockings of medium compression.  Mid-calf circumference is 30cm. 1 each 0     Phenytoin Sodium Extended 200 MG CAPS Take 200 mg by mouth daily 90 capsule 3     polyethylene glycol (MIRALAX/GLYCOLAX) Packet Take 17 g by mouth daily as needed for constipation       senna-docusate (SENOKOT-S;PERICOLACE) 8.6-50 MG per tablet Take 1 tablet by mouth daily         MEDICATION CHANGES/RATIONALE:   Oxycodone stopped  Previous Tylenol #3 orders started    Controlled medications sent with patient:   Remaining tablets of Tylenol #3 to be sent     ROS:    Unobtainable secondary to cognitive impairment.     Physical Exam:   Vitals: /67  Pulse 62  Temp 96.9  F (36.1  C)  Resp 16  Ht 5' 4\" (1.626 m)  Wt 151 lb 8 oz (68.7 kg)  SpO2 92%  BMI 26 kg/m2  BMI= Body mass index is 26 kg/(m^2).  GENERAL APPEARANCE:  Alert, watching TV, no apparent distress  ENT:  Mouth and posterior oropharynx normal, moist mucous membranes  EYES:  EOM normal, Conjunctiva and lids normal, difficulty opening R eye  NECK:  No adenopathy,masses or thyromegaly  RESP:  respiratory effort and palpation of chest normal, lungs clear to auscultation , no respiratory distress  CV:  Palpation and auscultation of heart done , regular rate and rhythm, no murmur, rub, or gallop  ABDOMEN:  normal bowel sounds, soft, nontender, no hepatosplenomegaly or other masses  SKIN:  Bruises R arm resolved  PSYCH:  oriented to self, insight and judgement impaired, memory impaired       DISCHARGE PLAN:  Home Health Aide and From:  [facility]  Patient instructed to follow-up with:  PCP in 7 days      [facility] scheduled appointments:  Future Appointments  Date Time Provider Department Center   5/5/2050 12:15 AM [address] referral needed and placed by this provider: No    Pending labs: none    SNF labs     CBC RESULTS:   Recent Labs   Lab Test  04/10/18   1647  09/26/17   1603   WBC  6.1  6.9 "   RBC  4.25  4.21   HGB  13.8  13.7   HCT  42.6  41.9   MCV  100  100   MCH  32.5  32.5   MCHC  32.4  32.7   RDW  12.6  12.7   PLT  210  229       Last Basic Metabolic Panel:  Recent Labs   Lab Test  04/10/18   1647  09/26/17   1603   NA  143  143   POTASSIUM  4.9  4.6   CHLORIDE  112*  110*   MILA  9.7  9.6   CO2  22  22   BUN  19  18   CR  0.71  0.60   GLC  84  75       Liver Function Studies -   Recent Labs   Lab Test  04/10/18   1647  09/26/17   1603   PROTTOTAL  7.5  7.3   ALBUMIN  3.8  3.7   BILITOTAL  0.2  0.2   ALKPHOS  83  85   AST  15  17   ALT  12  19       TSH   Date Value Ref Range Status   06/13/2018 3.81 0.30 - 5.00 uIU/mL Final   03/30/2011 0.77 0.4 - 5.0 mU/L Final       Lab Results   Component Value Date    A1C 5.0 11/03/2012       Discharge Treatments: none    TOTAL DISCHARGE TIME:   Greater than 30 minutes     Electronically signed by:  MILES Kumar [facility]  Pager: [phone]

## 2018-07-16 ENCOUNTER — PATIENT OUTREACH (OUTPATIENT)
Dept: GERIATRIC MEDICINE | Facility: CLINIC | Age: 73
End: 2018-07-16

## 2018-07-16 NOTE — PROGRESS NOTES
[facility] Care Coordination Contact  CC received notification of discharge to home. Discharge occurred on (Date07/16) from [facility] TCU.   CC contacted client's brother Toan Sanchez and reviewed the discharge summary.  Member has a follow-up appointment with PCP: Yes: scheduled on 7/18. It was scheduled today but client's brother called to change appointment.    Member has had a change in condition: No  Home visit needed: No  Care plan reviewed and updated.  The following home based services PCA were resumed.  Transition log completed.   PCP notified of transition back to home via EMR.  Hillary Romano RN  [facility]   [phone]

## 2018-07-17 NOTE — PROGRESS NOTES
SUBJECTIVE:   Ban Petersen is a 73 year old female who presents to clinic today for the following health issues:      Hospital Follow-up Visit:    Hospital/Nursing Home/IP Rehab Facility[place] [facility]  Date of Admission: 5/30/2018  Date of Discharge: 6/1/2018  Reason(s) for Admission: right prox humerus fx  From Discharge Summary:  HOSPITAL COURSE: Ban Petersen is a 73 y.o. woman with TBI, cognitive impairment and right-sided paralysis admitted on 5/30/2018 after a mechanical fall at home, found to have right proximal humerus fracture. Fracture was discussed with orthopedics service who recommended conservative non-operative management, sling for comfort, and orthopedics follow up. She worked with PT and OT and is discharged to [facility] where she has a brother residing.      Hospital/Nursing Home/IP Rehab Facility: [facility]  Date of Admission: 6/1/2018  Date of Discharge: 7/16/2018  Reason(s) for Admission: physical rehab  From Discharge Summary:  Closed fracture of proximal end of right humerus, unspecified fracture morphology, sequela  This is her flaccid arm s/p TBI. Bruising and swelling resolved.     H/O traumatic brain injury  Cognitive impairment  Paralysis (H)  Lives with her two brothers who are very involved. She is currently supervision to min assist for transfers and ADLs. This depends on her willingness to participate, alertness level. BIMS 7/15. CPT 3.33. Legally blind.      Seizure disorder (H)  Chronic. No witnessed seizures since admission. Dilantin level checked here was 5.4, previous level in clinic was 10.2. She has been on current dilantin dose for some time it appears. No changes were made to her dose.     Slow transit constipation  Patient unable to say if bowels are moving. Nursing has no acute concerns     Chronic pain syndrome  Family had requested scheduled pain medication. Per chart review, patient was on scheduled Tylenol #3 at home. This was restarted 6/21/18.  Nursing reports improved pain, no complaints/yelling out from patient as before. Sleeping well. No sedation noted.         Problems taking medications regularly:  None       Medication changes since discharge: None       Problems adhering to non-medication therapy:  None    Summary of hospitalization:  [facility] discharge summary reviewed  Diagnostic Tests/Treatments reviewed.  Follow up needed: none  Other Healthcare Providers Involved in Patient s Care:         None  Update since discharge: improved.     Post Discharge Medication Reconciliation: discharge medications reconciled, continue medications without change.  Plan of care communicated with patient, family/caregiver     Coding guidelines for this visit:  Type of Medical   Decision Making Face-to-Face Visit       within 7 Days of discharge Face-to-Face Visit        within 14 days of discharge   Moderate Complexity [id number]   High Complexity [id number]            Wen is here with her niece Sandra who is her PCA.  She has a complex medical history of TBI with hemiparesis, cognitive dysfunction and seizure disorder and fell and broke her right shoulder at the end of May.  She was treated conservatively with sling at [facility] and then went to TCU at [facility] for rehab.  Unfortunately she fell again the night before anticipated discharge from TCU so was kept an extra couple of days for observation.  (She discharged on Monday, 7/16 instead of Friday, 7/13.)  She had no subsequent falls.  She did sustain bruising to her face with that fall but denies other injury.    Medications are the same as prior to admit.  Sandra sets up her meds.    Sandra states her med supply is good - no need for refill authorizations today.    Wen denies pain or concerns today.   She has not had follow-up with Ortho.    Problem list and histories reviewed & adjusted, as indicated.  Additional history: as documented    BP Readings from Last 3 Encounters:   07/18/18 135/66    07/12/18 116/67   07/09/18 100/68    Wt Readings from Last 3 Encounters:   07/12/18 151 lb 8 oz (68.7 kg)   07/09/18 151 lb (68.5 kg)   07/02/18 152 lb 9.6 oz (69.2 kg)          Reviewed and updated as needed this visit by clinical staff  Tobacco  Allergies  Meds  Med Hx  Surg Hx  Fam Hx  Soc Hx            OBJECTIVE:     /66 (BP Location: Left arm, Patient Position: Other (comments))  Pulse 69  Temp 98.3  F (36.8  C) (Oral)  Resp 14  SpO2 95%  There is no height or weight on file to calculate BMI.  GEN:  no apparent distress, pleasant woman sitting in motorized wheel chair  HEAD/FACE: yellow bruising around left eye  LUNGS:  normal respiratory effort, and lungs clear to auscultation bilaterally - no rales, rhonchi or wheezes  CV: regular rate and rhythm, normal S1 S2, no S3 or S4 and no murmur, click or rub  EXTREM: right upper extremity rests flaccidly in her lap.  She is not wearing sling.      Diagnostic Test Results:  none     ASSESSMENT/PLAN:         ICD-10-CM    1. Closed fracture of proximal end of right humerus with routine healing, unspecified fracture morphology, subsequent encounter S42.201D    2. History of traumatic brain injury Z87.820    3. 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 Seizure disorder (H) G40.909       Reviewed that per their discharge summary Ortho had wanted to see her in their clinic for follow-up 2-4 weeks after hospital discharge.  It looks like an appointment was set up for Nenita 15 and she is listed as a No Show.  Of course she was at the TCU at the time so the appointment was not well-coordinated with the staff there.  I do still recommend Ortho follow-up and I recommend that be done at [facility] where her original care was.  Sandra will set that up.      She is otherwise stable and  will follow-up with PCP when next needing medication refills.      Winsome Johnston MD  [facility]

## 2018-07-18 ENCOUNTER — OFFICE VISIT (OUTPATIENT)
Dept: FAMILY MEDICINE | Facility: CLINIC | Age: 73
End: 2018-07-18
Payer: COMMERCIAL

## 2018-07-18 VITALS
TEMPERATURE: 98.3 F | OXYGEN SATURATION: 95 % | DIASTOLIC BLOOD PRESSURE: 66 MMHG | HEART RATE: 69 BPM | RESPIRATION RATE: 14 BRPM | SYSTOLIC BLOOD PRESSURE: 135 MMHG

## 2018-07-18 DIAGNOSIS — G81.01 FLACCID HEMIPLEGIA OF RIGHT DOMINANT SIDE DUE TO NONCEREBROVASCULAR ETIOLOGY (H): ICD-10-CM

## 2018-07-18 DIAGNOSIS — Z87.820 HISTORY OF TRAUMATIC BRAIN INJURY: ICD-10-CM

## 2018-07-18 DIAGNOSIS — G40.909 SEIZURE DISORDER (H): ICD-10-CM

## 2018-07-18 DIAGNOSIS — S42.201D CLOSED FRACTURE OF PROXIMAL END OF RIGHT HUMERUS WITH ROUTINE HEALING, UNSPECIFIED FRACTURE MORPHOLOGY, SUBSEQUENT ENCOUNTER: Primary | ICD-10-CM

## 2018-07-18 PROCEDURE — 99495 TRANSJ CARE MGMT MOD F2F 14D: CPT | Performed by: FAMILY MEDICINE

## 2018-07-18 NOTE — PATIENT INSTRUCTIONS
Contact [facility] and schedule follow-up appointment in their outpatient Orthopedic department.

## 2018-07-18 NOTE — MR AVS SNAPSHOT
After Visit Summary   7/18/2018    Ban Petersen    MRN: [id number]           Patient Information     Date Of Birth          1945        Visit Information        Provider Department      7/18/2018 3:40 PM Winsome Johnston MD [facility]        Today's Diagnoses     Closed fracture of proximal end of right humerus with routine healing, unspecified fracture morphology, subsequent encounter    -  1      Care Instructions    Contact [facility] and schedule follow-up appointment in their outpatient Orthopedic department.              Follow-ups after your visit        Who to contact     If you have questions or need follow up information about today's clinic visit or your schedule please contact [facility] directly at [phone].  Normal or non-critical lab and imaging results will be communicated to you by MyChart, letter or phone within 4 business days after the clinic has received the results. If you do not hear from us within 7 days, please contact the clinic through MyChart or phone. If you have a critical or abnormal lab result, we will notify you by phone as soon as possible.  Submit refill requests through [place] or call your pharmacy and they will forward the refill request to us. Please allow 3 business days for your refill to be completed.          Additional Information About Your Visit        Care EveryWhere ID     This is your Care EveryWhere ID. This could be used by other organizations to access your [facility] medical records  [id number]        Your Vitals Were     Pulse Temperature Respirations Pulse Oximetry          69 98.3  F (36.8  C) (Oral) 14 95%         Blood Pressure from Last 3 Encounters:   07/18/18 135/66   07/12/18 116/67   07/09/18 100/68    Weight from Last 3 Encounters:   07/12/18 151 lb 8 oz (68.7 kg)   07/09/18 151 lb (68.5 kg)   07/02/18 152 lb 9.6 oz (69.2 kg)              Today, you had the following     No orders found for display        Primary Care Provider Office Phone # Fax #    Joel Daniel Irwin Wegener, [phone] [address]  [facility] [address]        Equal Access to Services     MARIA TDARINEL JOSE : Hadii sedrick ku fabienneo Soestebanali, waaxda luqadaha, qaybta kaalmada ademary, frantz rameshshae melgar. So [facility] [phone].    ATENCIÓN: Si habla español, tiene a beltran disposición servicios gratuitos de asistencia lingüística. [facility] [phone].    We comply with applicable federal civil rights laws and [place] laws. We do not discriminate on the basis of race, color, national origin, age, disability, sex, sexual orientation, or gender identity.            Thank you!     Thank you for choosing [facility]  for your care. Our goal is always to provide you with excellent care. Hearing back from our patients is one way we can continue to improve our services. Please take a few minutes to complete the written survey that you may receive in the mail after your visit with us. Thank you!             Your Updated Medication List - Protect others around you: Learn how to safely use, store and throw away your medicines at www.disposemymeds.org.          This list is accurate as of 7/18/18  4:12 PM.  Always use your most recent med list.                   Brand Name Dispense Instructions for use Diagnosis    acetaminophen-codeine 300-30 MG per tablet    TYLENOL #3    10 tablet    Take 2 tablets by mouth 2 times daily And 1 tab at noon    Chronic pain syndrome       Calcium Carb-Cholecalciferol 600-200 MG-UNIT Tabs      Take 1 tablet by mouth daily        gabapentin 400 MG capsule    NEURONTIN    180 capsule    TAKE 1 CAPSULE(400 MG) BY MOUTH TWICE DAILY    Chronic pain syndrome       levETIRAcetam 750 MG 24 hr tablet    KEPPRA XR    90 tablet    TAKE 1 TABLET BY MOUTH DAILY    Seizure disorder (H)       LORazepam 0.5 MG tablet    ATIVAN    30 tablet    Take 30 minutes prior to eye visits with  injections as needed, may repeat in 1-4 hours as needed.    Macular degeneration (senile) of retina, Specific phobia       Multi-vitamin Tabs tablet      Take 1 tablet by mouth 2 times daily Ocuvite        order for DME     1 each    One pair of knee high compression stockings of medium compression.  Mid-calf circumference is 30cm.    Leg edema       Phenytoin Sodium Extended 200 MG Caps     90 capsule    Take 200 mg by mouth daily    Seizure disorder (H)       polyethylene glycol Packet    MIRALAX/GLYCOLAX     Take 17 g by mouth daily as needed for constipation        senna-docusate 8.6-50 MG per tablet    SENOKOT-S;PERICOLACE     Take 1 tablet by mouth daily        TYLENOL PO      Take 975 mg by mouth every 6 hours as needed for mild pain or fever

## 2018-08-28 ENCOUNTER — PATIENT OUTREACH (OUTPATIENT)
Dept: GERIATRIC MEDICINE | Facility: CLINIC | Age: 73
End: 2018-08-28

## 2018-08-28 NOTE — PROGRESS NOTES
[facility] Care Coordination Contact  CC spoke with client's marilyn Flores regarding setting up a home visit for annual assessment. She stated she will check her calendar and call CC back with dates for home visit.   Hillary Romano RN  [facility] Care Coordinator  [phone]

## 2018-08-31 NOTE — PROGRESS NOTES
[facility] Care Coordination Contact  Message left with client's niece Sandra requesting a call back to set up a home visit for annual assessment.   Hillary Romano RN  [facility] Care Coordinator  [phone]

## 2018-09-20 ENCOUNTER — PATIENT OUTREACH (OUTPATIENT)
Dept: GERIATRIC MEDICINE | Facility: CLINIC | Age: 73
End: 2018-09-20

## 2018-09-20 ASSESSMENT — ACTIVITIES OF DAILY LIVING (ADL)
DEPENDENT_IADLS:: CLEANING;COOKING;LAUNDRY;SHOPPING;MEAL PREPARATION;MEDICATION MANAGEMENT;MONEY MANAGEMENT;TRANSPORTATION

## 2018-09-21 ENCOUNTER — PATIENT OUTREACH (OUTPATIENT)
Dept: GERIATRIC MEDICINE | Facility: CLINIC | Age: 73
End: 2018-09-21

## 2018-09-21 NOTE — PROGRESS NOTES
[facility] Care Coordination Contact    [facility] Home Visit Assessment     Home visit for Health Risk Assessment with Ban Petersen completed on September 20, 2018    Type of residence:: Private home - no stairs  Current living arrangement:: I live in a private home with family          Current Care Plan  Member currently receiving the following home care services:   None  Member currently receiving the following community resources: DME, PCA    Medication Review  Medication reconciliation completed in Epic: Yes  Medication set-up & administration: Family/informal caregiver sets up weekly.  Family caregiver administers medications.  Medication understanding concerns (by member, family or CC): No  Medication adherence concerns (by member, family or CC): No    Mental/Behavioral Health   Depression Screening: See PHQ assessment flowsheet.   Mental health DX:: No      No current MH services-will place referral for N/A    Falls Assessment:   Fallen 2 or more times in the past year?: No   Any fall with injury in the past year?: Yes    ADL/IADL Dependencies:   Dependent ADLs:: Wheelchair-with assist, Transfers, Positioning, Toileting, Bathing, Dressing, Eating, Grooming  Dependent IADLs:: Cleaning, Cooking, Laundry, Shopping, Meal Preparation, Medication Management, Money Management, Transportation    [facility] sponsored benefits: Shared information re: Silver Sneakers/gym memberships, ASA, Calcium +D.    PCA Assessment completed at visit: Yes     Elderly Waiver Eligibility: Yes, but member declines EW services; will not open to EW    Care Plan & Recommendations: Continue PCA services and incontinence supplies.    See [facility] for detailed assessment information.    Follow-Up Plan: Member informed of future contact, plan to f/u with member with a 6 month telephone assessment.  Contact information shared with member and family, encouraged member to call with any questions or concerns at any  time.    [place] care continuum providers: Please refer to Health Care Home on the Epic Problem List to view this patient's [facility] Care Plan Summary.  Hillary Romano RN  [facility] Care Coordinator  [phone]

## 2018-09-21 NOTE — PROGRESS NOTES
[facility] Care Coordination Contact  [facility]:  Emailed completed PCA assessment to [facility].  Faxed copy of PCA assessment to PCA Agency and mailed copy to member.  Faxed MD Communication to PCP.   Melita Lange  Case Management Specialist  [facility]  [phone]

## 2018-09-21 NOTE — LETTER
[address], [address]  [place] [address]  Phone:  [phone]  Fax:  [phone]        September 21, 2018    ETIENNE GREWAL  [address] [place] [id number]    Dear Etienne,    Enclosed is a copy of your completed PCA Assessment and Service Plan.  This is for your records and no action is required by you.  If you have additional questions regarding your assessment please contact me at [phone]. If you feel that your needs are not being met, please contact the Clinical Supervisor at [phone].    Sincerely,      Hillary Romano RN, MA  Care Coordinator  [facility]  [phone]      Enclosure:  Completed PCA assessment

## 2018-10-09 ENCOUNTER — RADIANT APPOINTMENT (OUTPATIENT)
Dept: GENERAL RADIOLOGY | Facility: CLINIC | Age: 73
End: 2018-10-09
Attending: FAMILY MEDICINE
Payer: COMMERCIAL

## 2018-10-09 ENCOUNTER — OFFICE VISIT (OUTPATIENT)
Dept: FAMILY MEDICINE | Facility: CLINIC | Age: 73
End: 2018-10-09
Payer: COMMERCIAL

## 2018-10-09 VITALS
SYSTOLIC BLOOD PRESSURE: 135 MMHG | OXYGEN SATURATION: 92 % | WEIGHT: 151 LBS | HEART RATE: 71 BPM | TEMPERATURE: 98.1 F | DIASTOLIC BLOOD PRESSURE: 75 MMHG | BODY MASS INDEX: 25.78 KG/M2 | HEIGHT: 64 IN

## 2018-10-09 DIAGNOSIS — M25.552 HIP PAIN, LEFT: ICD-10-CM

## 2018-10-09 DIAGNOSIS — H35.30 MACULAR DEGENERATION (SENILE) OF RETINA: ICD-10-CM

## 2018-10-09 DIAGNOSIS — S42.201D CLOSED FRACTURE OF PROXIMAL END OF RIGHT HUMERUS WITH ROUTINE HEALING, UNSPECIFIED FRACTURE MORPHOLOGY, SUBSEQUENT ENCOUNTER: ICD-10-CM

## 2018-10-09 DIAGNOSIS — G89.4 CHRONIC PAIN SYNDROME: ICD-10-CM

## 2018-10-09 DIAGNOSIS — S42.201D CLOSED FRACTURE OF PROXIMAL END OF RIGHT HUMERUS WITH ROUTINE HEALING, UNSPECIFIED FRACTURE MORPHOLOGY, SUBSEQUENT ENCOUNTER: Primary | ICD-10-CM

## 2018-10-09 DIAGNOSIS — H61.22 IMPACTED CERUMEN OF LEFT EAR: ICD-10-CM

## 2018-10-09 DIAGNOSIS — H61.21 IMPACTED CERUMEN OF RIGHT EAR: ICD-10-CM

## 2018-10-09 DIAGNOSIS — F40.298 SPECIFIC PHOBIA: ICD-10-CM

## 2018-10-09 PROCEDURE — 20610 DRAIN/INJ JOINT/BURSA W/O US: CPT | Mod: LT | Performed by: FAMILY MEDICINE

## 2018-10-09 PROCEDURE — 73060 X-RAY EXAM OF HUMERUS: CPT | Mod: RT

## 2018-10-09 PROCEDURE — 73502 X-RAY EXAM HIP UNI 2-3 VIEWS: CPT

## 2018-10-09 PROCEDURE — 99214 OFFICE O/P EST MOD 30 MIN: CPT | Mod: 25 | Performed by: FAMILY MEDICINE

## 2018-10-09 PROCEDURE — 69209 REMOVE IMPACTED EAR WAX UNI: CPT | Mod: 59 | Performed by: FAMILY MEDICINE

## 2018-10-09 RX ORDER — LORAZEPAM 0.5 MG/1
TABLET ORAL
Qty: 30 TABLET | Refills: 1 | Status: SHIPPED | OUTPATIENT
Start: 2018-10-09 | End: 2018-11-14

## 2018-10-09 NOTE — PROGRESS NOTES
"  SUBJECTIVE:   Ban Petersen is a 73 year old female who presents to clinic today for the following health issues:     Left Hip Pain    Ban presents to clinic today with her niece Sandra with complaints of left hip pain, states that it \"feels like her hip is out.\" Both deny any recent falls or trauma that can explain her current symptoms.     Ear Wash  Has earwax cleaned out 1-2 times per year and would like to have this taken care of today. Has been making it hard to hear.     Advanced Healthcare Directive  Ban's younger brother will be assuming guardianship responsibilities and the family would like to have an updated code status completed.          Problem list, Medication list, Allergies, and Medical/Social/Surgical histories reviewed in [facility] and updated as appropriate.  Labs reviewed in EPIC  BP Readings from Last 3 Encounters:   10/09/18 135/75   07/18/18 135/66   07/12/18 116/67    Wt Readings from Last 3 Encounters:   10/09/18 151 lb (68.5 kg)   07/12/18 151 lb 8 oz (68.7 kg)   07/09/18 151 lb (68.5 kg)                  Patient Active Problem List   Diagnosis     Epilepsy (H)     Flaccid hemiplegia (H)     Family history of osteoporosis     Hemiparesis (H)     IBS (irritable bowel syndrome)     CARDIOVASCULAR SCREENING; LDL GOAL LESS THAN 160     Abdominal pain, unspecified abdominal location     Cholelithiasis with obstruction     Health Care Home     Advanced directives, counseling/discussion     Hip pain     Physical deconditioning     Seizure disorder (H)     Spondylosis of lumbar region without myelopathy or radiculopathy     Chronic pain syndrome     Brain injury, with LOC > 24 hr without return to prior conscious level, patient surviving, sequela     Oropharyngeal dysphagia     Cognitive dysfunction     Aphasia     Past Surgical History:   Procedure Laterality Date     C NONSPECIFIC PROCEDURE      Cholecystectomy     CHOLECYSTECTOMY       ENDOSCOPIC RETROGRADE CHOLANGIOPANCREATOGRAM WITH " DIRECT VISUALIZATION SYSTEM AND GENERATOR  5/2/2011    Procedure:ENDOSCOPIC RETROGRADE CHOLANGIOPANCREATOGRAM WITH DIRECT VISUALIZATION SYSTEM AND GENERATOR; with Spyglass, Exchange of Bile Duct Stent, Removal of Bile Duct Stone ; Surgeon:PAIGE MAHER; Location:[facility]     ENDOSCOPIC RETROGRADE CHOLANGIOPANCREATOGRAM WITH SPYGLASS  4/1/2011    Procedure:ENDOSCOPIC RETROGRADE CHOLANGIOPANCREATOGRAM WITH SPYGLASS; Biliary Spincterotomy, Endo retrograde insertion of stent into bile duct, Endo retrograde balloon dilation of bilary ducts, Electryhydraulic Lithotripsy; Surgeon:PAIGE MAHER; Location:[facility]     ESOPHAGOSCOPY, GASTROSCOPY, DUODENOSCOPY (EGD), COMBINED  6/2/2011    Procedure:COMBINED ESOPHAGOSCOPY, GASTROSCOPY, DUODENOSCOPY (EGD), REMOVE FOREIGN BODY; Surgeon:PAIGE MAHER; Location:[facility]     gall stone  5/2011    only took out gallstone, not gallbladder       Social History   Substance Use Topics     Smoking status: Former Smoker     Packs/day: 1.50     Years: 19.00     Types: Cigarettes     Quit date: 10/22/1985     Smokeless tobacco: Never Used     Alcohol use No     History reviewed. No pertinent family history.      Current Outpatient Prescriptions   Medication Sig Dispense Refill     Acetaminophen (TYLENOL PO) Take 975 mg by mouth every 6 hours as needed for mild pain or fever       [START ON 11/1/2018] acetaminophen-codeine (TYLENOL #3) 300-30 MG per tablet Take 2 tablets by mouth 2 times daily And 1 tab at noon, #150/month 150 tablet 0     Calcium Carb-Cholecalciferol 600-200 MG-UNIT TABS Take 1 tablet by mouth daily       gabapentin (NEURONTIN) 400 MG capsule TAKE 1 CAPSULE(400 MG) BY MOUTH TWICE DAILY 180 capsule 3     levETIRAcetam (KEPPRA XR) 750 MG 24 hr tablet TAKE 1 TABLET BY MOUTH DAILY (Patient taking differently: Takes 1/2 tablet daily) 90 tablet 1     LORazepam (ATIVAN) 0.5 MG tablet Take 30 minutes prior to eye visits with injections as needed, may repeat in 1-4 hours as needed. 30 tablet  "1     multivitamin, therapeutic with minerals (MULTI-VITAMIN) TABS Take 1 tablet by mouth 2 times daily Ocuvite       ORDER FOR DME One pair of knee high compression stockings of medium compression.  Mid-calf circumference is 30cm. 1 each 0     Phenytoin Sodium Extended 200 MG CAPS Take 200 mg by mouth daily 90 capsule 3     polyethylene glycol (MIRALAX/GLYCOLAX) Packet Take 17 g by mouth daily as needed for constipation       senna-docusate (SENOKOT-S;PERICOLACE) 8.6-50 MG per tablet Take 1 tablet by mouth daily       Allergies   Allergen Reactions     No Known Drug Allergies      Recent Labs   Lab Test 06/13/18  04/10/18   1647  09/26/17   1603  09/29/16   1620  08/05/15   1610   11/03/12   0633   03/30/11   0648   A1C   --    --    --    --    --    --   5.0   --    --    LDL   --    --   130*  128*  120   --   100   < >   --    HDL   --    --   54  59  54   --   55   < >   --    TRIG   --    --   248*  165*  163*   --   174*   < >   --    ALT   --   12  19  15  20   < >   --    < >  319*   CR   --   0.71  0.60  0.57  0.69   < >   --    < >  0.62   GFRESTIMATED   --   81  >90  >90  Non African American GFR Calc    84   < >   --    < >  >90   GFRESTBLACK   --   >90  >90  >90  African American GFR Calc    >90   GFR Calc     < >   --    < >  >90   POTASSIUM   --   4.9  4.6  4.5  4.3   < >   --    < >  4.0   TSH  3.81   --    --    --    --    --    --    --   0.77    < > = values in this interval not displayed.        ROS:  Constitutional, HEENT, cardiovascular, pulmonary, GI, , musculoskeletal, neuro, skin, endocrine and psych systems are negative, except as otherwise noted.        OBJECTIVE:  /75  Pulse 71  Temp 98.1  F (36.7  C)  Ht 5' 4\" (1.626 m)  Wt 151 lb (68.5 kg)  SpO2 92%  BMI 25.92 kg/m2    EXAM:  GENERAL APPEARANCE: healthy, alert and no distress  MSK: left hip bursa tenderness with palpation  Bilateral cerumen impaction     In wheelchair, non-ambulatory (due to stroke).  " Able to stand briefly for exam.           ASSESSMENT AND PLAN  1. Chronic pain syndrome  Refilled chronic med today.  Contract up to date.  Supervised by marilyn.   - acetaminophen-codeine (TYLENOL #3) 300-30 MG per tablet; Take 2 tablets by mouth 2 times daily And 1 tab at noon, #150/month  Dispense: 150 tablet; Refill: 0    3. Specific phobia  Uses mainly for medical appointments and procedures such as dental work and eye exams.   - LORazepam (ATIVAN) 0.5 MG tablet; Take 30 minutes prior to eye visits with injections as needed, may repeat in 1-4 hours as needed.  Dispense: 30 tablet; Refill: 1    4. Closed fracture of proximal end of right humerus with routine healing, unspecified fracture morphology, subsequent encounter    Previous treatment, due for follow up xray requested today so they do not have to travel to [facility].   - Follow-up imaging recommended by orthopedic team who addressed her fall this summer.   - R humerus XR    5. Hip pain, left    Injection note, left trochanteric bursa: I obtained oral and written informed consent after discussion of risks (stria, infection, atrophy, elevated blood sugars) and benefits (improved pain) and alternative treatments (none, icing, nsaids, physical therapy).      I cleaned the skin over the greater trochanter with three iodine swabs.     Next, using a 25 gauge 1 1/2 inch needle I injected a mixture of 4 ml 1% lidocaine without epinepherine and 1 ml 40mg/ml kenalog from a multivial container(NDC # 1283-7971-16) into the bursa in a sterile fashion.     The area was cleaned and a bandaid applied.     After care instructions provided:  No special precautions, activity as tolerated.       - Left hip trochanteric bursitis.   - Treat with steroid injection today.   - L hip XR to evaluate for occult fracture or displacement.       6. Impacted cerumen of right ear  Irrigate today  - HC REMOVAL IMPACTED CERUMEN IRRIGATION/LVG UNILAT    7. Impacted cerumen of left ear  Irrigate  "today.   - HC REMOVAL IMPACTED CERUMEN IRRIGATION/LVG UNILAT    8.  Code status/advanced directives:  IN context of decreased cognition/memory from stroke.   Per previous discussions with Wen and her caregiver/neice who always comes to appointments with her I feel it would be appropriate to be DNR/DNI, no intubation , no artificial nutrition but still full treatment/hospitalization.      Her younger brother is establishing legal guardian status.  I gave both advanced directive paperwork and also a POLST to Wen and her niece to bring and discuss with Wen's brother.      I recommended that if we are not all in agreement on these goals at this time that we would meet to discuss as the recommendations above in my mind represent what Wen would want.     Over the past several years based on previous discussions and discussions today she has expressed interest in relatively minimal medical intervention, does not want to suffer/needlessly prolong life but generally says \"if something can be fixed\" then \"go ahead.\" when we have specifically discussed in past or today such things as \"breathing tube\" she says \"no way.\"      Spent 15 minutes counseling providing advanced directive counseling today.     Joel Wegener,MD        "

## 2018-10-09 NOTE — MR AVS SNAPSHOT
"              After Visit Summary   10/9/2018    Ban Petersen    MRN: [id number]           Patient Information     Date Of Birth          1945        Visit Information        Provider Department      10/9/2018 3:40 PM Wegener, Joel Daniel Irwin, MD [facility]        Today's Diagnoses     Chronic pain syndrome        Macular degeneration (senile) of retina        Specific phobia          Care Instructions    Reviewed advanced directive/polst with patient and niece.  She will review with new guardian (younger brother) and return.     Refilled contracted T#3 and lorazepam.     Irrigate ears today.     Will do follow up xray right humerous after fracture.     Did left hip trochanteric bursa injection.     Xray left hip to eval for occult fracture/displacement.     Follow up six months or earlier as needed.               Follow-ups after your visit        Who to contact     If you have questions or need follow up information about today's clinic visit or your schedule please contact [facility] directly at [phone].  Normal or non-critical lab and imaging results will be communicated to you by MyChart, letter or phone within 4 business days after the clinic has received the results. If you do not hear from us within 7 days, please contact the clinic through [employer] or phone. If you have a critical or abnormal lab result, we will notify you by phone as soon as possible.  Submit refill requests through [employer] or call your pharmacy and they will forward the refill request to us. Please allow 3 business days for your refill to be completed.          Additional Information About Your Visit        Care EveryWhere ID     This is your Care EveryWhere ID. This could be used by other organizations to access your [place] medical records  [id number]        Your Vitals Were     Pulse Temperature Height Pulse Oximetry BMI (Body Mass Index)       71 98.1  F (36.7  C) 5' 4\" (1.626 m) 92% " 25.92 kg/m2        Blood Pressure from Last 3 Encounters:   10/09/18 135/75   07/18/18 135/66   07/12/18 116/67    Weight from Last 3 Encounters:   10/09/18 151 lb (68.5 kg)   07/12/18 151 lb 8 oz (68.7 kg)   07/09/18 151 lb (68.5 kg)              Today, you had the following     No orders found for display         Today's Medication Changes          These changes are accurate as of 10/9/18  4:58 PM.  If you have any questions, ask your nurse or doctor.               These medicines have changed or have updated prescriptions.        Dose/Directions    acetaminophen-codeine 300-30 MG per tablet   Commonly known as:  TYLENOL #3   This may have changed:    - additional instructions  - These instructions start on 11/1/2018. If you are unsure what to do until then, ask your doctor or other care provider.   Used for:  Chronic pain syndrome   Changed by:  Wegener, Joel Daniel Irwin, MD        Dose:  2 tablet   Start taking on:  11/1/2018   Take 2 tablets by mouth 2 times daily And 1 tab at noon, #150/month   Quantity:  150 tablet   Refills:  0       levETIRAcetam 750 MG 24 hr tablet   Commonly known as:  KEPPRA XR   This may have changed:  See the new instructions.   Used for:  Seizure disorder (H)        TAKE 1 TABLET BY MOUTH DAILY   Quantity:  90 tablet   Refills:  1            Where to get your medicines      Some of these will need a paper prescription and others can be bought over the counter.  Ask your nurse if you have questions.     Bring a paper prescription for each of these medications     acetaminophen-codeine 300-30 MG per tablet    LORazepam 0.5 MG tablet               Information about OPIOIDS     PRESCRIPTION OPIOIDS: WHAT YOU NEED TO KNOW   We gave you an opioid (narcotic) pain medicine. It is important to manage your pain, but opioids are not always the best choice. You should first try all the other options your care team gave you. Take this medicine for as short a time (and as few doses) as  possible.    Some activities can increase your pain, such as bandage changes or therapy sessions. It may help to take your pain medicine 30 to 60 minutes before these activities. Reduce your stress by getting enough sleep, working on hobbies you enjoy and practicing relaxation or meditation. Talk to your care team about ways to manage your pain beyond prescription opioids.    These medicines have risks:    DO NOT drive when on new or higher doses of pain medicine. These medicines can affect your alertness and reaction times, and you could be arrested for driving under the influence (DUI). If you need to use opioids long-term, talk to your care team about driving.    DO NOT operate heavy machinery    DO NOT do any other dangerous activities while taking these medicines.    DO NOT drink any alcohol while taking these medicines.     If the opioid prescribed includes acetaminophen, DO NOT take with any other medicines that contain acetaminophen. Read all labels carefully. Look for the word  acetaminophen  or  Tylenol.  Ask your pharmacist if you have questions or are unsure.    You can get addicted to pain medicines, especially if you have a history of addiction (chemical, alcohol or substance dependence). Talk to your care team about ways to reduce this risk.    All opioids tend to cause constipation. Drink plenty of water and eat foods that have a lot of fiber, such as fruits, vegetables, prune juice, apple juice and high-fiber cereal. Take a laxative (Miralax, milk of magnesia, Colace, Senna) if you don t move your bowels at least every other day. Other side effects include upset stomach, sleepiness, dizziness, throwing up, tolerance (needing more of the medicine to have the same effect), physical dependence and slowed breathing.    Store your pills in a secure place, locked if possible. We will not replace any lost or stolen medicine. If you don t finish your medicine, please throw away (dispose) as directed by your  pharmacist. The [place] Pollution Control Agency has more information about safe disposal: [email or web address]-medications         Primary Care Provider Office Phone # Fax #    Joel Daniel Irwin Wegener, [phone] [address]  [facility] [address]        Equal Access to Services     [facility] Hospital[facility]ILDA : Hadii aad ku hadasho Soomaali, waaxda luqadaha, qaybta kaalmada adeegyada, waxay idiin hayaan adeeg kharash la'aan ah. So [facility] [phone].    ATENCIÓN: Si habla español, tiene a beltran disposición servicios gratuitos de asistencia lingüística. Shoaib al [phone].    We comply with applicable federal civil rights laws and [place] laws. We do not discriminate on the basis of race, color, national origin, age, disability, sex, sexual orientation, or gender identity.            Thank you!     Thank you for choosing [facility]  for your care. Our goal is always to provide you with excellent care. Hearing back from our patients is one way we can continue to improve our services. Please take a few minutes to complete the written survey that you may receive in the mail after your visit with us. Thank you!             Your Updated Medication List - Protect others around you: Learn how to safely use, store and throw away your medicines at www.disposemymeds.org.          This list is accurate as of 10/9/18  4:58 PM.  Always use your most recent med list.                   Brand Name Dispense Instructions for use Diagnosis    acetaminophen-codeine 300-30 MG per tablet   Start taking on:  11/1/2018    TYLENOL #3    150 tablet    Take 2 tablets by mouth 2 times daily And 1 tab at noon, #150/month    Chronic pain syndrome       Calcium Carb-Cholecalciferol 600-200 MG-UNIT Tabs      Take 1 tablet by mouth daily        gabapentin 400 MG capsule    NEURONTIN    180 capsule    TAKE 1 CAPSULE(400 MG) BY MOUTH TWICE DAILY    Chronic pain syndrome        levETIRAcetam 750 MG 24 hr tablet    KEPPRA XR    90 tablet    TAKE 1 TABLET BY MOUTH DAILY    Seizure disorder (H)       LORazepam 0.5 MG tablet    ATIVAN    30 tablet    Take 30 minutes prior to eye visits with injections as needed, may repeat in 1-4 hours as needed.    Macular degeneration (senile) of retina, Specific phobia       Multi-vitamin Tabs tablet      Take 1 tablet by mouth 2 times daily Ocuvite        order for DME     1 each    One pair of knee high compression stockings of medium compression.  Mid-calf circumference is 30cm.    Leg edema       Phenytoin Sodium Extended 200 MG Caps     90 capsule    Take 200 mg by mouth daily    Seizure disorder (H)       polyethylene glycol Packet    MIRALAX/GLYCOLAX     Take 17 g by mouth daily as needed for constipation        senna-docusate 8.6-50 MG per tablet    SENOKOT-S;PERICOLACE     Take 1 tablet by mouth daily        TYLENOL PO      Take 975 mg by mouth every 6 hours as needed for mild pain or fever

## 2018-10-09 NOTE — PATIENT INSTRUCTIONS
Reviewed advanced directive/polst with patient and niece.  She will review with new guardian (younger brother) and return.     Refilled contracted T#3 and lorazepam.     Irrigate ears today.     Will do follow up xray right humerous after fracture.     Did left hip trochanteric bursa injection.     Xray left hip to eval for occult fracture/displacement.     Follow up six months or earlier as needed.

## 2018-10-10 RX ORDER — TRIAMCINOLONE ACETONIDE 40 MG/ML
40 INJECTION, SUSPENSION INTRA-ARTICULAR; INTRAMUSCULAR ONCE
Qty: 1 ML | Refills: 0 | OUTPATIENT
Start: 2018-10-10 | End: 2018-10-10

## 2018-10-11 ENCOUNTER — PATIENT OUTREACH (OUTPATIENT)
Dept: GERIATRIC MEDICINE | Facility: CLINIC | Age: 73
End: 2018-10-11

## 2018-10-11 NOTE — LETTER
October 11, 2018    ETIENNE GREWAL  [address]  [facility] [facility] [address]    Dear Etienne,     At [facility], we re dedicated to improving the health and well-being of our members.  Enclosed you will find the Comprehensive Care Plan that was developed with you on 9/20/18. Please review the Care Plan carefully.    As a reminder, some of the things we discussed at your visit include:    Ways to improve or maintain your physical health such as walking 20 minutes a day.     Ways to reduce the risk of falls.     Health care needs you may have.     Don t forget to contact your care coordinator if you:    Have been hospitalized or plan to be hospitalized.     Have experienced a fall.      Have experienced a change in physical health, which may include bladder control and pain issues.      Are experiencing emotional problems.     If you do not agree with your Care Plan, have questions about it, or have experienced a change in your needs, please contact me, your care coordinator, at [phone]. If you are hearing impaired, please call the [place] Relay at 146 or [phone] (xrcrua-il-yvljah relay service).    Sincerely,      Hillary Romano RN, [facility]     [employer] is a health plan that contracts with both Medicare and the [place] Medical Assistance (Medicaid) program to provide benefits of both programs to enrollees. Enrollment in [facility] depends on contract renewal.    MSC+ [id number] IA ([id number])                                                  (12/16)

## 2018-11-02 ENCOUNTER — TELEPHONE (OUTPATIENT)
Dept: FAMILY MEDICINE | Facility: CLINIC | Age: 73
End: 2018-11-02

## 2018-11-02 DIAGNOSIS — H35.30 MACULAR DEGENERATION (SENILE) OF RETINA: ICD-10-CM

## 2018-11-02 DIAGNOSIS — F40.298 SPECIFIC PHOBIA: ICD-10-CM

## 2018-11-02 DIAGNOSIS — F41.0 PANIC ATTACK: Primary | ICD-10-CM

## 2018-11-02 NOTE — TELEPHONE ENCOUNTER
Central Prior Authorization Team   Phone: [phone]      PA Initiation    Medication: LORazepam (ATIVAN) 0.5 MG tablet, rec 10-30-18  Insurance Company: [employer] - Phone [phone] Fax [phone]  Pharmacy Filling the Rx: [facility] [address] [place] [address] AT [facility] [address]  Filling Pharmacy Phone: [phone]  Filling Pharmacy Fax:    Start Date: 11/2/2018

## 2018-11-02 NOTE — TELEPHONE ENCOUNTER
PRIOR AUTHORIZATION DENIED    Medication: LORazepam (ATIVAN) 0.5 MG tablet, rec 10-30-18    Denial Date: 11/2/2018    Denial Rational:      Appeal Information:    If you would like to appeal, please supply P/A team with a letter of medical necessity with clinical reason.

## 2018-11-02 NOTE — TELEPHONE ENCOUNTER
Prior Authorization Retail Medication Request    Medication/Dose: LORazepam (ATIVAN) 0.5 MG tablet  ICD code (if different than what is on RX):  Previously Tried and Failed:  Rationale:    Insurance Name:    Insurance ID: [id number]    Pharmacy Information (if different than what is on RX)  Name:  Phone:    Please include previous medications tried and failed.  Please ask insurance for medications on formulary.

## 2018-11-05 NOTE — TELEPHONE ENCOUNTER
Called home and spoke to her brother Saul.  Ban is not able to speak on the phone.  States to call Sandra the personal caretaker after 4 PM Tuesday.  I did attempt to call Sandra on her hoe phone but mailbox is full. Not able to leave a message.  Oneyda Rain RN

## 2018-11-05 NOTE — TELEPHONE ENCOUNTER
PA was denied. Please order alternative med with complete SIG or begin appeal process.     If you would like to appeal:   Create letter of medical necessity or    Compile supporting clinical documentation in EPIC Telephone encounter (TE).    Route TE to: [facility].

## 2018-11-07 NOTE — TELEPHONE ENCOUNTER
Message left for on mobile phone for Sandra Sibleyvalente care taker  to return call to clinic and ask to speak to available triage nurse     Sandra - niece care taker home phone is full and unable to leave message     Carlisa Herr Registered Nurse   [facility]

## 2018-11-14 RX ORDER — LORAZEPAM 0.5 MG/1
TABLET ORAL
Qty: 30 TABLET | Refills: 1 | Status: SHIPPED | OUTPATIENT
Start: 2018-11-14 | End: 2018-12-11

## 2018-11-14 NOTE — TELEPHONE ENCOUNTER
Please re-fax prescription, I added panic attack as diagnosis appropriately and expect it will be covered.     In nurse basket. Joel Wegener,MD

## 2018-12-11 ENCOUNTER — OFFICE VISIT (OUTPATIENT)
Dept: FAMILY MEDICINE | Facility: CLINIC | Age: 73
End: 2018-12-11
Payer: COMMERCIAL

## 2018-12-11 VITALS
RESPIRATION RATE: 16 BRPM | OXYGEN SATURATION: 96 % | BODY MASS INDEX: 25.92 KG/M2 | HEART RATE: 61 BPM | HEIGHT: 64 IN | DIASTOLIC BLOOD PRESSURE: 74 MMHG | TEMPERATURE: 97.9 F | SYSTOLIC BLOOD PRESSURE: 130 MMHG

## 2018-12-11 DIAGNOSIS — H61.21 IMPACTED CERUMEN OF RIGHT EAR: ICD-10-CM

## 2018-12-11 DIAGNOSIS — H61.22 IMPACTED CERUMEN OF LEFT EAR: ICD-10-CM

## 2018-12-11 DIAGNOSIS — G89.4 CHRONIC PAIN SYNDROME: ICD-10-CM

## 2018-12-11 DIAGNOSIS — F41.0 PANIC ATTACK: ICD-10-CM

## 2018-12-11 DIAGNOSIS — H35.30 MACULAR DEGENERATION (SENILE) OF RETINA: ICD-10-CM

## 2018-12-11 DIAGNOSIS — F40.298 SPECIFIC PHOBIA: ICD-10-CM

## 2018-12-11 DIAGNOSIS — G40.909 SEIZURE DISORDER (H): ICD-10-CM

## 2018-12-11 DIAGNOSIS — M70.62 TROCHANTERIC BURSITIS OF LEFT HIP: Primary | ICD-10-CM

## 2018-12-11 DIAGNOSIS — Z23 NEED FOR PROPHYLACTIC VACCINATION AND INOCULATION AGAINST INFLUENZA: ICD-10-CM

## 2018-12-11 PROCEDURE — G0008 ADMIN INFLUENZA VIRUS VAC: HCPCS | Performed by: FAMILY MEDICINE

## 2018-12-11 PROCEDURE — 99214 OFFICE O/P EST MOD 30 MIN: CPT | Mod: 25 | Performed by: FAMILY MEDICINE

## 2018-12-11 PROCEDURE — 90662 IIV NO PRSV INCREASED AG IM: CPT | Performed by: FAMILY MEDICINE

## 2018-12-11 PROCEDURE — 69209 REMOVE IMPACTED EAR WAX UNI: CPT | Performed by: FAMILY MEDICINE

## 2018-12-11 PROCEDURE — 36415 COLL VENOUS BLD VENIPUNCTURE: CPT | Performed by: FAMILY MEDICINE

## 2018-12-11 PROCEDURE — 80185 ASSAY OF PHENYTOIN TOTAL: CPT | Performed by: FAMILY MEDICINE

## 2018-12-11 RX ORDER — LORAZEPAM 0.5 MG/1
TABLET ORAL
Qty: 30 TABLET | Refills: 1 | Status: SHIPPED | OUTPATIENT
Start: 2018-12-11 | End: 2020-02-11 | Stop reason: CLARIF

## 2018-12-11 NOTE — PROGRESS NOTES
SUBJECTIVE:   Ban Petersen is a 73 year old female who presents to clinic today for the following health issues:         Chronic pain syndrome: due for refills.  Here with niece who is her caregiver and monitors medications.  Continues to work well.   Macular degeneration (senile) of retina  Specific phobia  Panic attack: never did get lorazepam filled which uses for panic attack/anxiety with eye procedures.   Seizure disorder (H): none for years.   Trochanteric bursitis of left hip: injection did not really help unfortunately.   Impacted cerumen of left ear  Impacted cerumen of right ear  Need for prophylactic vaccination and inoculation against influenza :        Problem list, Medication list, Allergies, and Medical/Social/Surgical histories reviewed in [facility] and updated as appropriate.  Labs reviewed in EPIC  BP Readings from Last 3 Encounters:   12/11/18 130/74   10/09/18 135/75   07/18/18 135/66    Wt Readings from Last 3 Encounters:   10/09/18 68.5 kg (151 lb)   07/12/18 68.7 kg (151 lb 8 oz)   07/09/18 68.5 kg (151 lb)                  Patient Active Problem List   Diagnosis     Epilepsy (H)     Flaccid hemiplegia (H)     Family history of osteoporosis     Hemiparesis (H)     IBS (irritable bowel syndrome)     CARDIOVASCULAR SCREENING; LDL GOAL LESS THAN 160     Abdominal pain, unspecified abdominal location     Cholelithiasis with obstruction     Health Care Home     Advanced directives, counseling/discussion     Hip pain     Physical deconditioning     Seizure disorder (H)     Spondylosis of lumbar region without myelopathy or radiculopathy     Chronic pain syndrome     Brain injury, with LOC > 24 hr without return to prior conscious level, patient surviving, sequela     Oropharyngeal dysphagia     Cognitive dysfunction     Aphasia     Past Surgical History:   Procedure Laterality Date     C NONSPECIFIC PROCEDURE      Cholecystectomy     CHOLECYSTECTOMY       ENDOSCOPIC RETROGRADE CHOLANGIOPANCREATOGRAM  WITH DIRECT VISUALIZATION SYSTEM AND GENERATOR  2011    Procedure:ENDOSCOPIC RETROGRADE CHOLANGIOPANCREATOGRAM WITH DIRECT VISUALIZATION SYSTEM AND GENERATOR; with Spyglass, Exchange of Bile Duct Stent, Removal of Bile Duct Stone ; Surgeon:PAIGE MAHER; Location:[facility]     ENDOSCOPIC RETROGRADE CHOLANGIOPANCREATOGRAM WITH SPYGLASS  2011    Procedure:ENDOSCOPIC RETROGRADE CHOLANGIOPANCREATOGRAM WITH SPYGLASS; Biliary Spincterotomy, Endo retrograde insertion of stent into bile duct, Endo retrograde balloon dilation of bilary ducts, Electryhydraulic Lithotripsy; Surgeon:PAIGE MAHER; Location:[facility] OR     ESOPHAGOSCOPY, GASTROSCOPY, DUODENOSCOPY (EGD), COMBINED  2011    Procedure:COMBINED ESOPHAGOSCOPY, GASTROSCOPY, DUODENOSCOPY (EGD), REMOVE FOREIGN BODY; Surgeon:PAIGE MAHER; Location:[facility]     gall stone  2011    only took out gallstone, not gallbladder       Social History     Tobacco Use     Smoking status: Former Smoker     Packs/day: 1.50     Years: 19.00     Pack years: 28.50     Types: Cigarettes     Last attempt to quit: 10/22/1985     Years since quittin.2     Smokeless tobacco: Never Used   Substance Use Topics     Alcohol use: No     History reviewed. No pertinent family history.      Current Outpatient Medications   Medication Sig Dispense Refill     Acetaminophen (TYLENOL PO) Take 975 mg by mouth every 6 hours as needed for mild pain or fever       acetaminophen-codeine (TYLENOL #3) 300-30 MG tablet Take 2 tablets by mouth 2 times daily And 1-2 tab at noon, #160/month 160 tablet 5     Calcium Carb-Cholecalciferol 600-200 MG-UNIT TABS Take 1 tablet by mouth daily       gabapentin (NEURONTIN) 400 MG capsule TAKE 1 CAPSULE(400 MG) BY MOUTH TWICE DAILY 180 capsule 3     levETIRAcetam (KEPPRA XR) 750 MG 24 hr tablet TAKE 1 TABLET BY MOUTH DAILY (Patient taking differently: Takes 1/2 tablet daily) 90 tablet 1     LORazepam (ATIVAN) 0.5 MG tablet Take 30 minutes prior to eye visits with  "injections as needed, may repeat in 1-4 hours as needed. 30 tablet 1     multivitamin, therapeutic with minerals (MULTI-VITAMIN) TABS Take 1 tablet by mouth 2 times daily Ocuvite       ORDER FOR DME One pair of knee high compression stockings of medium compression.  Mid-calf circumference is 30cm. 1 each 0     Phenytoin Sodium Extended 200 MG CAPS Take 200 mg by mouth daily 90 capsule 3     polyethylene glycol (MIRALAX/GLYCOLAX) Packet Take 17 g by mouth daily as needed for constipation       senna-docusate (SENOKOT-S;PERICOLACE) 8.6-50 MG per tablet Take 1 tablet by mouth daily       Allergies   Allergen Reactions     No Known Drug Allergies      Recent Labs   Lab Test 06/13/18 04/10/18  1647 09/26/17  1603 09/29/16  1620 08/05/15  1610  11/03/12  0633  03/30/11  0648   A1C  --   --   --   --   --   --  5.0  --   --    LDL  --   --  130* 128* 120  --  100   < >  --    HDL  --   --  54 59 54  --  55   < >  --    TRIG  --   --  248* 165* 163*  --  174*   < >  --    ALT  --  12 19 15 20   < >  --    < > 319*   CR  --  0.71 0.60 0.57 0.69   < >  --    < > 0.62   GFRESTIMATED  --  81 >90 >90  Non  GFR Calc   84   < >  --    < > >90   GFRESTBLACK  --  >90 >90 >90   GFR Calc   >90   GFR Calc     < >  --    < > >90   POTASSIUM  --  4.9 4.6 4.5 4.3   < >  --    < > 4.0   TSH 3.81  --   --   --   --   --   --   --  0.77    < > = values in this interval not displayed.        ROS:  Constitutional, HEENT, cardiovascular, pulmonary, GI, , musculoskeletal, neuro, skin, endocrine and psych systems are negative, except as otherwise noted.        OBJECTIVE:  /74 (BP Location: Left arm, Patient Position: Sitting, Cuff Size: Adult Regular)   Pulse 61   Temp 97.9  F (36.6  C) (Oral)   Resp 16   Ht 1.626 m (5' 4\")   SpO2 96%   BMI 25.92 kg/m      EXAM:  GENERAL APPEARANCE: healthy, alert and no distress  RESP: lungs clear to auscultation - no rales, rhonchi or wheezes  CV: " regular rates and rhythm, normal S1 S2, no S3 or S4 and no murmur, click or rub -  ABDOMEN:  soft, nontender, no HSM or masses and bowel sounds normal  Pain with palpation left greater trochanter.     In wheelchair.   Responds to questions but mainly focuses on eyes. Re-directed appropriately by niece.   Bilateral cerumen impaction.     ASSESSMENT AND PLAN  1. Chronic pain syndrome  Reviewed indications for opiod therapy/diagnosis and DIRE score. Joint decision made to continue with opiod therapy.  Agreed at this point no significant side effects including oversedation, cognitive slowing or significant constipation. No evidence/suspicion of opiod induced hyperalgesia.  Pt aware these are potential side effects especially with higher doses .     Last urine drug screen appropriate.     DIRE score reviewed/appropriate today:    8/7/2015 1/3/2019   Total Score 17 18       Does the patient and myself as provider feel the patient has engaged in a reasonable amount of self management techniques (physical therapy, mental health care, etc):  yes    Drug screen done today to evaluate for drug interactions, illicit drug use, toxicity: no    Renewed three month prescriptions and gave to pt.     Reviewed principles of controlled substance contract including reminded No early refills and no replacement of lost/stolen/damaged paper prescriptions or medications.     Refills in three months at visit only - 3 month visit scheduled today.     No suspicious activity seen on [place] prescription monitoring database.     MILLIGRAMS MORPHINE EQUIVALENT:  22.5  in high risk category per CDC guidelines: no (above 50)  Narcan prescription indicated/prescribed:  yes    utox not needed since meds supervisited and zero concern for abuse/diversion. However must do per [place] policy.  Already urinated today.  Will plan to do next visit.     - acetaminophen-codeine (TYLENOL #3) 300-30 MG tablet; Take 2 tablets by mouth 2 times daily And 1-2 tab at  "noon, #160/month  Dispense: 160 tablet; Refill: 5    2. Macular degeneration (senile) of retina    - LORazepam (ATIVAN) 0.5 MG tablet; Take 30 minutes prior to eye visits with injections as needed, may repeat in 1-4 hours as needed.  Dispense: 30 tablet; Refill: 1    3. Specific phobia  Uses lorazepam for eye procedures only  - LORazepam (ATIVAN) 0.5 MG tablet; Take 30 minutes prior to eye visits with injections as needed, may repeat in 1-4 hours as needed.  Dispense: 30 tablet; Refill: 1    4. Panic attack    - LORazepam (ATIVAN) 0.5 MG tablet; Take 30 minutes prior to eye visits with injections as needed, may repeat in 1-4 hours as needed.  Dispense: 30 tablet; Refill: 1    5. Seizure disorder (H)    - Phenytoin level    6. Trochanteric bursitis of left hip  Injection did not help. Continue icing as this has helped.     7. Impacted cerumen of left ear    - HC REMOVAL IMPACTED CERUMEN IRRIGATION/LVG UNILAT    8. Impacted cerumen of right ear    - HC REMOVAL IMPACTED CERUMEN IRRIGATION/LVG UNILAT    9. Need for prophylactic vaccination and inoculation against influenza    - FLU VACCINE, INCREASED ANTIGEN, PRESV FREE, AGE 65+ [30762]  - Vaccine Administration, Initial [36259]        MYCHART FOR ON-LINE CARE(VISITS), LABS, REFILLS, MESSAGING, ETC [email or web address] , 1-224.318.7099    E-VISIT: click \"on-line care, then request e-visit\".  E-visits work well for following up on issues we have discussed in clinic previously which may need new prescriptions, new prescriptions or substantial discussion. These are always done by me (Dr. Wegener).     ONCARE VISIT:  [email or web address]  - we treat nearly 50 common conditions through on-care.  These are done in an hour by on-call staff.     RADIOLOGY:  [facility]:  [phone]   [facility]: [phone]    Mammogram and Colonoscopy [phone]    Smoking Cessation: www.quitplan.org, 7-243-269-PLAN (4830)      CONSUMER PRICE LINE for " estimates of test costs:  [phone]       Joel Wegener, MD          [facility] Influenza Immunization Documentation    1.  Is the person to be vaccinated sick today?   No    2. Does the person to be vaccinated have an allergy to a component   of the vaccine?   No  Egg Allergy Algorithm Link    3. Has the person to be vaccinated ever had a serious reaction   to influenza vaccine in the past?   No    4. Has the person to be vaccinated ever had Guillain-Barré syndrome?   No    Form completed by Patient niece.      .Meri Clement MA

## 2018-12-12 LAB — PHENYTOIN SERPL-MCNC: 7 MG/L (ref 10–20)

## 2018-12-13 ENCOUNTER — TELEPHONE (OUTPATIENT)
Dept: FAMILY MEDICINE | Facility: CLINIC | Age: 73
End: 2018-12-13

## 2018-12-14 NOTE — TELEPHONE ENCOUNTER
Prior Authorization Not Needed per Insurance    Medication: LORazepam 0.5MG OR TABS - PA ALREADY DENIED SEE ENCOUTER DATED 11/02/2018 - WOULD NEED APPEAL    Insurance Company: [employer] - Phone [phone] Fax [phone]  Expected CoPay:      Pharmacy Filling the Rx: [facility] [address] [place] [address] - [address]  Pharmacy Notified: No  Patient Notified: No    I got this rejection once I submitted by request.     I did look back in the encounters and yes, this request for PA has already been submitted and denied.  Please see the documentation from encounter dated 11/02/2018. At this point, the denial would need to be appealed.  We do require a letter of medical necessity and placed in the patient's chart before we start the process.    Thank you,  Adia Nayak, [facility]  Central PA Team

## 2018-12-14 NOTE — TELEPHONE ENCOUNTER
PA Initiation    Medication: LORazepam 0.5MG OR TABS - PA ALREADY DENIED SEE ENCOUTER DATED 11/02/2018 - WOULD NEED APPEAL    Insurance Company: [employer] - Phone [phone] Fax [phone]  Pharmacy Filling the Rx: [facility] [id number] [place] [address] [address]  Filling Pharmacy Phone: [phone]  Filling Pharmacy Fax:    Start Date: 12/14/2018    [place] PRIOR AUTHORIZATION  [phone]

## 2019-01-08 NOTE — NURSING NOTE
Patient identified using two patient identifiers.  Ear exam showing wax occlusion completed by provider.  Solution: warm water was placed in the both ear(s) via irrigation tool: elephant ear.

## 2019-04-09 ENCOUNTER — PATIENT OUTREACH (OUTPATIENT)
Dept: GERIATRIC MEDICINE | Facility: CLINIC | Age: 74
End: 2019-04-09

## 2019-04-09 NOTE — PROGRESS NOTES
[facility] Care Coordination Contact      [facility] Six-Month Telephone Assessment    6 month telephone assessment completed on 04/09/19.    ER visits: No  Hospitalizations: No  TCU stays: No  Significant health status changes: No Continues to receive assistance from her family.  Falls/Injuries: No  ADL/IADL changes: No  Changes in services: No    Caregiver Assessment follow up:  N/A    Goals: See POC in chart for goal progress documentation.      Will see member in 6 months for an annual health risk assessment.   Encouraged member to call CC with any questions or concerns in the meantime.   Hillary Romano RN  [facility] Care Coordinator  [phone]

## 2019-04-12 NOTE — PROGRESS NOTES
During the 6 month telephone call, member's niece asked about getting a tub which would be easier for transfers. 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 CC explored a BI waiver but member is over 65 and would not qualify. CC explained that the PCA hours would have to be reduced in order to keep within the budget for a tub remodel. She stated she understood and would speak with member's guardian about this. She stated she did not think her would want to reduce the PCA hours at this time.   Hillary Romano RN  [facility] Care Coordinator  [phone]

## 2019-05-08 DIAGNOSIS — G89.4 CHRONIC PAIN SYNDROME: ICD-10-CM

## 2019-05-08 RX ORDER — GABAPENTIN 400 MG/1
CAPSULE ORAL
Qty: 180 CAPSULE | Refills: 0 | Status: SHIPPED | OUTPATIENT
Start: 2019-05-08 | End: 2019-08-20

## 2019-05-08 NOTE — LETTER
May 9, 2019      Ban Petersen  [address]  [facility] [place] [address]        Dear Ban,     In order to ensure we are providing the best quality care, we have reviewed your chart and see that you are due for:  1.   Annual physical and labs sometime in mid June    Please call the clinic at your earliest convenience to schedule an appointment.    We greatly appreciate the opportunity to serve you.  Thank you for trusting us with your health care.    Your care team at [facility]        Sincerely,        Joel Daniel Wegener, MD

## 2019-05-08 NOTE — TELEPHONE ENCOUNTER
GABAPENTIN 400MG CAPSULES      Last Written Prescription Date:  4/10/2018  Last Fill Quantity: 180 capsule,   # refills: 3  Last Office Visit: 12/11/2018  Future Office visit:       Routing refill request to provider for review/approval because:  Drug not on the [facility], P or [facility] refill protocol or controlled substance

## 2019-05-26 ENCOUNTER — TRANSFERRED RECORDS (OUTPATIENT)
Dept: HEALTH INFORMATION MANAGEMENT | Facility: CLINIC | Age: 74
End: 2019-05-26

## 2019-05-29 ENCOUNTER — OFFICE VISIT (OUTPATIENT)
Dept: FAMILY MEDICINE | Facility: CLINIC | Age: 74
End: 2019-05-29
Payer: COMMERCIAL

## 2019-05-29 VITALS
SYSTOLIC BLOOD PRESSURE: 138 MMHG | OXYGEN SATURATION: 98 % | TEMPERATURE: 97.8 F | HEART RATE: 68 BPM | RESPIRATION RATE: 16 BRPM | DIASTOLIC BLOOD PRESSURE: 85 MMHG

## 2019-05-29 DIAGNOSIS — H61.22 IMPACTED CERUMEN OF LEFT EAR: ICD-10-CM

## 2019-05-29 DIAGNOSIS — G89.4 CHRONIC PAIN SYNDROME: ICD-10-CM

## 2019-05-29 DIAGNOSIS — E55.9 HYPOVITAMINOSIS D: ICD-10-CM

## 2019-05-29 DIAGNOSIS — Z13.6 CARDIOVASCULAR SCREENING; LDL GOAL LESS THAN 160: ICD-10-CM

## 2019-05-29 DIAGNOSIS — H61.21 IMPACTED CERUMEN OF RIGHT EAR: ICD-10-CM

## 2019-05-29 DIAGNOSIS — Z51.81 ENCOUNTER FOR THERAPEUTIC DRUG MONITORING: ICD-10-CM

## 2019-05-29 DIAGNOSIS — Z87.820 HISTORY OF TRAUMATIC BRAIN INJURY: ICD-10-CM

## 2019-05-29 DIAGNOSIS — G40.909 SEIZURE DISORDER (H): ICD-10-CM

## 2019-05-29 DIAGNOSIS — M54.9 TRIGGER POINT WITH BACK PAIN: Primary | ICD-10-CM

## 2019-05-29 DIAGNOSIS — G81.01 FLACCID HEMIPLEGIA OF RIGHT DOMINANT SIDE DUE TO NONCEREBROVASCULAR ETIOLOGY (H): ICD-10-CM

## 2019-05-29 LAB
ERYTHROCYTE [DISTWIDTH] IN BLOOD BY AUTOMATED COUNT: 12.5 % (ref 10–15)
HCT VFR BLD AUTO: 41.6 % (ref 35–47)
HGB BLD-MCNC: 13.9 G/DL (ref 11.7–15.7)
MCH RBC QN AUTO: 34 PG (ref 26.5–33)
MCHC RBC AUTO-ENTMCNC: 33.4 G/DL (ref 31.5–36.5)
MCV RBC AUTO: 102 FL (ref 78–100)
PLATELET # BLD AUTO: 209 10E9/L (ref 150–450)
RBC # BLD AUTO: 4.09 10E12/L (ref 3.8–5.2)
WBC # BLD AUTO: 6.1 10E9/L (ref 4–11)

## 2019-05-29 PROCEDURE — 36415 COLL VENOUS BLD VENIPUNCTURE: CPT | Performed by: FAMILY MEDICINE

## 2019-05-29 PROCEDURE — 80053 COMPREHEN METABOLIC PANEL: CPT | Performed by: FAMILY MEDICINE

## 2019-05-29 PROCEDURE — 82306 VITAMIN D 25 HYDROXY: CPT | Performed by: FAMILY MEDICINE

## 2019-05-29 PROCEDURE — 85027 COMPLETE CBC AUTOMATED: CPT | Performed by: FAMILY MEDICINE

## 2019-05-29 PROCEDURE — 99214 OFFICE O/P EST MOD 30 MIN: CPT | Mod: 25 | Performed by: FAMILY MEDICINE

## 2019-05-29 PROCEDURE — 20552 NJX 1/MLT TRIGGER POINT 1/2: CPT | Performed by: FAMILY MEDICINE

## 2019-05-29 PROCEDURE — 80185 ASSAY OF PHENYTOIN TOTAL: CPT | Performed by: FAMILY MEDICINE

## 2019-05-29 PROCEDURE — 69209 REMOVE IMPACTED EAR WAX UNI: CPT | Mod: 59 | Performed by: FAMILY MEDICINE

## 2019-05-29 PROCEDURE — 80061 LIPID PANEL: CPT | Performed by: FAMILY MEDICINE

## 2019-05-29 NOTE — LETTER
[facility]  05/29/19    Patient: Ban Petersen  YOB: 1945  Medical Record Number: [id number]                                                                  Opioid / Opioid Plus Controlled Substance Agreement    I understand that my care provider has prescribed an opioid (narcotic) controlled substance to help manage my condition(s). I am taking this medicine to help me function or work. I know this is strong medicine, and that it can cause serious side effects. Opioid medicine can be sedating, addicting and may cause a dependency on the drug. They can affect my ability to drive or think, and cause depression. They need to be taken exactly as prescribed. Combining opioids with certain medicines or chemicals (such as cocaine, sedatives and tranquilizers, sleeping pills, meth) can be dangerous or even fatal. Also, if I stop opioids suddenly, I may have severe withdrawal symptoms. Last, I understand that opioids do not work for all types of pain nor for all patients. If not helpful, I may be asked to stop them.    I am also being prescribed a benzodiazepine (tranquilizer) controlled substance.   I understand this type of medication is sedating, and can increase the risk of death when taken together with opioids.  I have talked to my care team about the option of having a prescription for Narcan to use to reverse the opioid medicine, in case I get too sleepy. I will be very careful to take my medicines only as directed.    The risks, benefits, and side effects of these medicine(s) were explained to me. I agree that:    1. I will take part in other treatments as advised by my care team. This may be psychiatry or counseling, physical therapy, behavioral therapy, group treatment or a referral to a pain clinic. I will reduce or stop my medicine when my care team tells me to do so.  2. I will take my medicines as prescribed. I will not change the dose or schedule unless my care team tells me  to. There will be no refills if I  run out early.   I may be contactedwithout warning and asked to complete a urine drug test or pill count at any time.   3. I will keep all my appointments, and understand this is part of the monitoring of opioids. My care team may require an office visit for EVERY opioid/controlled substance refill. If I miss appointments or don t follow instructions, my care team may stop my medicine.  4. I will not ask other providers to prescribe controlled substances, and I will not accept controlled substances from other people. If I need another prescribed controlled substance for a new reason, I will tell my care team within 1 business day.  5. I will use one pharmacy to fill all of my controlled substance prescriptions, and it is up to me to make sure that I do not run out of my medicines on weekends or holidays. If my care team is willing to refill my opioid prescription without a visit, I must request refills only during office hours, refills may take up to 3 days to process, and it may take up to 5 to 7 days for my medicine to be mailed and ready at my pharmacy. Prescriptions will not be mailed anywhere except my pharmacy.        [id number]  Rev 12/18         Registration to scan to EHR                             Page 1 of 2               Controlled Substance Agreement Opioid        [facility]  05/29/19  Patient: Ban Petersen  YOB: 1945  Medical Record Number: [id number]                                                                  6. I am responsible for my prescriptions. If the medicine/prescription is lost or stolen, it will not be replaced. I also agree not to share controlled substance medicines with anyone.  7. I agree to not use ANY illegal or recreational drugs. This includes marijuana, cocaine, bath salts or other drugs. I agree not to use alcohol unless my care team says I may.          I agree to give urine samples whenever asked. If I don t give  a urine sample, the care team may stop my medicine.    8. If I enroll in the [place] Medical Marijuana program, I will tell my care team. I will also sign an agreement to share my medical records with my care team.   9. I will bring in my list of medicines (or my medicine bottles) each time I come to the clinic.   10. I will tell my care team right away if I become pregnant or have a new medical problem treated outside of my regular clinic.  11. I understand that this medicine can affect my thinking and judgment. It may be unsafe for me to drive, use machinery and do dangerous tasks. I will not do any of these things until I know how the medicine affects me. If my dose changes, I will wait to see how it affects me. I will contact my care team if I have concerns about medicine side effects.    I understand that if I do not follow any of the conditions above, my prescriptions or treatment may be stopped.      I agree that my provider, clinic care team, and pharmacy may work with any city, state or federal law enforcement agency that investigates the misuse, sale, or other diversion of my controlled medicine. I will allow my provider to discuss my care with or share a copy of this agreement with any other treating provider, pharmacy or emergency room where I receive care. I agree to give up (waive) any right of privacy or confidentiality with respect to these consents.     I have read this agreement and have asked questions about anything I did not understand.      ________________________________________________________________________  Patient signature - Date/Time -  Ban Petersen                                      ________________________________________________________________________  Witness signature                                                            ________________________________________________________________________  Provider signature - Joel Daniel Wegener, MD      [id number]  Rev 12/18          Registration to scan to EHR                         Page 2 of 2                   Controlled Substance Agreement Opioid           Page 1 of 2  Opioid Pain Medicines (also known as Narcotics)  What You Need to Know    What are opioids?   Opioids are pain medicines that must be prescribed by a doctor.  They are also known as narcotics.    Examples are:     morphine (MS Contin, Debra)    oxycodone (Oxycontin)    oxycodone and acetaminophen (Percocet)    hydrocodone and acetaminophen (Vicodin, Norco)     fentanyl patch (Duragesic)     hydromorphone (Dilaudid)     methadone     What do opioids do well?   Opioids are best for short-term pain after a surgery or injury. They also work well for cancer pain. Unlike other pain medicines, they do not cause liver or kidney failure or ulcers. They may help some people with long-lasting (chronic) pain.     What do opioids NOT do well?   Opioids never get rid of pain entirely, and they do not work well for most patients with chronic pain. Opioids do not reduce swelling, one of the causes of pain. They also don t work well for nerve pain.                           For informational purposes only.  Not to replace the advice of your care provider.  Copyright [facility]. All right reserved. [employer] 638783-Sqw 02/18.      Page 2 of 2    Risks and side effects   Talk to your doctor before you start or decide to keep taking one of these medicines. Side effects include:    Lowering your breathing rate enough to cause death    Overdose, including death, especially if taking higher than prescribed doses    Long-term opioid use    Worse depression symptoms; less pleasure in things you usually enjoy    Feeling tired or sluggish    Slower thoughts or cloudy thinking    Being more sensitive to pain over time; pain is harder to control    Trouble sleeping or restless sleep    Changes in hormone levels (for example, less testosterone)    Changes in sex drive or ability to  have sex    Constipation    Unsafe driving    Itching and sweating    Feeling dizzy    Nausea, vomiting and dry mouth    What else should I know about opioids?  When someone takes opioids for too long or too often, they become dependent. This means that if you stop or reduce the medicine too quickly, you will have withdrawal symptoms.    Dependence is not the same as addiction. Addiction is when people keep using a substance that harms their body, their mind or their relations with others. If you have a history of drug or alcohol abuse, taking opioids can cause a relapse.    Over time, opioids don t work as well. Most people will need higher and higher doses. The higher the dose, the more serious the side effects. We don t know the long-term effects of opioids.      Prescribed opioids aren't the best way to manage chronic pain    Other ways to manage pain include:      Ibuprofen or acetaminophen.  You should always try this first.      Treat health problems that may be causing pain.      acupuncture or massage, deep breathing, meditation, visual imagery, aromatherapy.      Use heat or ice at the pain site      Physical therapy and exercise      Stop smoking      See a counselor or therapist                                                  People who have used opioids for a long time may have a lower quality of life, worse depression, higher levels of pain and more visits to doctors.    Never share your opioids with others. Be sure to store opioids in a secure place, locked if possible.Young children can easily swallow them and overdose.     You can overdose on opioids.  Signs of overdose include decrease or loss of consciousness, slowed breathing, trouble waking and blue lips.  If someone is worried about overdose, they should call 911.    If you are at risk for overdose, you may get naloxone (Narcan, a medicine that reverses the effects of opioids.  If you overdose, a friend or family member can give you Narcan  while waiting for the ambulance.  They need to know the signs of overdose and how to give Narcan.    While you're taking opioids:    Don't use alcohol or street drugs. Taking them together can cause death.    Don't take any of these medicines unless your doctor says its okay.  Taking these with opioids can cause death.    Benzodiazepines (such as lorazepam         or diazepam)    Muscle relaxers (such as cyclobenzaprine)    sleeping pills    other opioids    Safe disposal of opioids  Find your area drug take-back program, your pharmacy mail-back program, buy a special disposal bag (such as Deterra) from your pharmacy or flush them down the toilet.  Use the guidelines at:  www.fda.gov/drugs/resourcesforyou

## 2019-05-29 NOTE — PROGRESS NOTES
"Subjective     Ban Petersen is a 74 year old female who presents to clinic today for the following health issues:    HPI     Patient is here for follow up for seizure and some lab work left hip cortisone shot.          Chronic pain syndrome: back mainly, here with caregiver niece who administer's med.  Recently low back much more painful.  Seems to not fully be in the hip.  She grabs her left low back and has to \"scooch forward\" in wheelchair for comfort so not sitting upright.    Trigger point with back pain: niece feels has palpable knot/muscle , trying to massage out which has helped some.  Icing as well.   Impacted cerumen of left ear: desire re-check.   Impacted cerumen of right ear  Seizure disorder (H): none for years, none since last visit. Due for lab monitoring.   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   CARDIOVASCULAR SCREENING; LDL GOAL LESS THAN 160  Encounter for therapeutic drug monitoring  Hypovitaminosis D :        Problem list, Medication list, Allergies, and Medical/Social/Surgical histories reviewed in [facility] and updated as appropriate.  Labs reviewed in EPIC  BP Readings from Last 3 Encounters:   05/29/19 138/85   12/11/18 130/74   10/09/18 135/75    Wt Readings from Last 3 Encounters:   10/09/18 68.5 kg (151 lb)   07/12/18 68.7 kg (151 lb 8 oz)   07/09/18 68.5 kg (151 lb)                  Patient Active Problem List   Diagnosis     Epilepsy (H)     Flaccid hemiplegia (H)     Family history of osteoporosis     Hemiparesis (H)     IBS (irritable bowel syndrome)     CARDIOVASCULAR SCREENING; LDL GOAL LESS THAN 160     Abdominal pain, unspecified abdominal location     Cholelithiasis with obstruction     Health Care Home     Advanced directives, counseling/discussion     Hip pain     Physical deconditioning     Seizure disorder (H)     Spondylosis of lumbar region " without myelopathy or radiculopathy     Chronic pain syndrome     Brain injury, with LOC > 24 hr without return to prior conscious level, patient surviving, sequela     Oropharyngeal dysphagia     Cognitive dysfunction     Aphasia     Past Surgical History:   Procedure Laterality Date     C NONSPECIFIC PROCEDURE      Cholecystectomy     CHOLECYSTECTOMY       ENDOSCOPIC RETROGRADE CHOLANGIOPANCREATOGRAM WITH DIRECT VISUALIZATION SYSTEM AND GENERATOR  2011    Procedure:ENDOSCOPIC RETROGRADE CHOLANGIOPANCREATOGRAM WITH DIRECT VISUALIZATION SYSTEM AND GENERATOR; with Spyglass, Exchange of Bile Duct Stent, Removal of Bile Duct Stone ; Surgeon:PAIGE MAHER; Location:[facility]     ENDOSCOPIC RETROGRADE CHOLANGIOPANCREATOGRAM WITH SPYGLASS  2011    Procedure:ENDOSCOPIC RETROGRADE CHOLANGIOPANCREATOGRAM WITH SPYGLASS; Biliary Spincterotomy, Endo retrograde insertion of stent into bile duct, Endo retrograde balloon dilation of bilary ducts, Electryhydraulic Lithotripsy; Surgeon:PAIGE MAHER; Location:[facility] OR     ESOPHAGOSCOPY, GASTROSCOPY, DUODENOSCOPY (EGD), COMBINED  2011    Procedure:COMBINED ESOPHAGOSCOPY, GASTROSCOPY, DUODENOSCOPY (EGD), REMOVE FOREIGN BODY; Surgeon:PAIGE MAHER; Location:[facility]     gall stone  2011    only took out gallstone, not gallbladder       Social History     Tobacco Use     Smoking status: Former Smoker     Packs/day: 1.50     Years: 19.00     Pack years: 28.50     Types: Cigarettes     Last attempt to quit: 10/22/1985     Years since quittin.6     Smokeless tobacco: Never Used   Substance Use Topics     Alcohol use: No     History reviewed. No pertinent family history.      Current Outpatient Medications   Medication Sig Dispense Refill     Acetaminophen (TYLENOL PO) Take 975 mg by mouth every 6 hours as needed for mild pain or fever       acetaminophen-codeine (TYLENOL #3) 300-30 MG tablet Take 2 tablets by mouth 2 times daily And 1-2 tab at noon, #160/month 160 tablet 5      Calcium Carb-Cholecalciferol 600-200 MG-UNIT TABS Take 1 tablet by mouth daily       gabapentin (NEURONTIN) 400 MG capsule TAKE 1 CAPSULE(400 MG) BY MOUTH TWICE DAILY 180 capsule 0     levETIRAcetam (KEPPRA XR) 750 MG 24 hr tablet TAKE 1 TABLET BY MOUTH DAILY (Patient taking differently: Takes 1/2 tablet daily) 90 tablet 1     LORazepam (ATIVAN) 0.5 MG tablet Take 30 minutes prior to eye visits with injections as needed, may repeat in 1-4 hours as needed. 30 tablet 1     multivitamin, therapeutic with minerals (MULTI-VITAMIN) TABS Take 1 tablet by mouth 2 times daily Ocuvite       ORDER FOR DME One pair of knee high compression stockings of medium compression.  Mid-calf circumference is 30cm. 1 each 0     Phenytoin Sodium Extended 200 MG CAPS Take 200 mg by mouth daily 90 capsule 3     polyethylene glycol (MIRALAX/GLYCOLAX) Packet Take 17 g by mouth daily as needed for constipation       senna-docusate (SENOKOT-S;PERICOLACE) 8.6-50 MG per tablet Take 1 tablet by mouth daily       Allergies   Allergen Reactions     No Known Drug Allergies      Recent Labs   Lab Test 06/13/18 04/10/18  1647 09/26/17  1603 09/29/16  1620 08/05/15  1610  11/03/12  0633  03/30/11  0648   A1C  --   --   --   --   --   --  5.0  --   --    LDL  --   --  130* 128* 120  --  100   < >  --    HDL  --   --  54 59 54  --  55   < >  --    TRIG  --   --  248* 165* 163*  --  174*   < >  --    ALT  --  12 19 15 20   < >  --    < > 319*   CR  --  0.71 0.60 0.57 0.69   < >  --    < > 0.62   GFRESTIMATED  --  81 >90 >90  Non  GFR Calc   84   < >  --    < > >90   GFRESTBLACK  --  >90 >90 >90   GFR Calc   >90   GFR Calc     < >  --    < > >90   POTASSIUM  --  4.9 4.6 4.5 4.3   < >  --    < > 4.0   TSH 3.81  --   --   --   --   --   --   --  0.77    < > = values in this interval not displayed.        ROS:  Constitutional, HEENT, cardiovascular, pulmonary, GI, , musculoskeletal, neuro, skin, endocrine and  psych systems are negative, except as otherwise noted.        OBJECTIVE:  /85   Pulse 68   Temp 97.8  F (36.6  C) (Oral)   Resp 16   SpO2 98%     EXAM:  GENERAL APPEARANCE: healthy, alert and no distress  2.5 cm area of Palpable muscle spasm/trigger point left lower lumbar area, palpation reproduces her pain.   Dysarthric speech.  Participates in conversation some as usual.   Appears well.   Bilateral ear canals with 75% cerumen impaction.     ASSESSMENT AND PLAN  Patient Instructions   Left lower back trigger point injection today.     Monitoring labs.     Lorazepam not needed (used for procedures only).     Refilled T #3, meds supervised by family closely, no need for urine test which would be difficult.       Irrigate ears today.     Follow up 5 months before snow comes for re-check and flu shot.     ASSESSMENT AND PLAN  1. Chronic pain syndrome  Reviewed indications for opiod therapy.     Joint decision made to continue with opiod therapy.      Agreed at this point no significant side effects including oversedation, cognitive slowing or significant constipation.     No evidence/suspicion of opiod induced hyperalgesia.  Pt aware these are potential side effects especially with higher doses .     Last urine drug screen appropriate.     DIRE score reviewed/appropriate today: (> 13 considered reasonable candidate for opiod treatment)     8/7/2015 1/3/2019   Total Score 17 18       Does the patient and myself as provider feel the patient has engaged in a reasonable amount of self management techniques (physical therapy, mental health care, etc):  yes    Drug screen done today to evaluate for drug interactions, illicit drug use, toxicity: not today - not needed, zero concern for abuse/misuse.     Controlled substance contract signed today and patient given copy to review at home as well: reviewed/signed by marilyn today.     Controlled substance contract now currently up to date and on file.     Reviewed  principles of controlled substance contract including reminded No early refills and no replacement of lost/stolen/damaged paper prescriptions or medications.     Refills in three months at visit only - 6 month visit scheduled today. (sheduled one month early to avoid snow. )     No suspicious activity seen on [place] prescription monitoring database reviewed today.     MILLIGRAMS MORPHINE EQUIVALENT:  25  in high risk category per CDC guidelines: no (above 50)      Renewed three month prescriptions and gave to pt.     2. Trigger point with back pain  Trigger point injection:  First obtained written and oral informed consent including risks (stria, infection, bruising, elevated blood sugar) and benefits (decreased pain) of trigger point injection. Localized the area of maximal muscle tenderness in the left lower paralumbar muscles which consisted of a 2.5 cm area of nodular painful muscle spasm.   Next marked this point of maximal tenderness on the lower back with a pen and sterilized the skin with three iodine swabs.   Finally mixed 4ml 1% lidocaine without epinepherine and 1cc  kenalog 10 (triamcinolone acetonide 50mg/5ml, NDC # 7074-6885-32) and injected this in the muscular trigger point as described above.    The patient  tolerated the procedure well.       3. Impacted cerumen of left ear  Irrigation today.   - HC REMOVAL IMPACTED CERUMEN IRRIGATION/LVG UNILAT    4. Impacted cerumen of right ear  same  - HC REMOVAL IMPACTED CERUMEN IRRIGATION/LVG UNILAT    5. Seizure disorder (H)  Monitoring labs today.   - Comprehensive metabolic panel  - CBC with platelets  - Phenytoin level    6. History of traumatic brain injury  Noted underlying cause of seizure disorder.     7. 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     8. CARDIOVASCULAR SCREENING; LDL GOAL LESS THAN 160    - Lipid panel reflex to direct LDL Fasting    9. Encounter for therapeutic drug  "monitoring    - Comprehensive metabolic panel  - CBC with platelets  - Phenytoin level    10. Hypovitaminosis D    - Vitamin D Deficiency        MYCHART FOR ON-LINE CARE(VISITS), LABS, REFILLS, MESSAGING, ETC [email or web address] , [phone]    E-VISIT: click \"on-line care, then request e-visit\".  E-visits work well for following up on issues we have discussed in clinic previously which may need new prescriptions, new prescriptions or substantial discussion. These are always done by me (Dr. Wegener).     ONCARE VISIT:  [email or web address]  - we treat nearly 50 common conditions through on-care.  These are done in an hour by on-call staff.     RADIOLOGY:  [facility]:  [phone]   [facility]: [phone]    Mammogram and Colonoscopy [phone]    Smoking Cessation: www.[email or web address], [phone] ([phone])      CONSUMER PRICE LINE for estimates of test costs:  [phone]         Joel Wegener, MD        "

## 2019-05-29 NOTE — PATIENT INSTRUCTIONS
Left lower back trigger point injection today.     Monitoring labs.     Lorazepam not needed (used for procedures only).     Refilled T #3, meds supervised by family closely, no need for urine test which would be difficult.       Irrigate ears today.     Follow up 5 months before snow comes for re-check and flu shot.

## 2019-05-29 NOTE — LETTER
[place] [id number] [facility] [address] [phone]          June 14, 2019    Ban Petersen                                                                                                                     [address] [facility] [address]        Dear Ban,  Results stable/normal, no further med changes needed    Results for orders placed or performed in visit on 05/29/19   Comprehensive metabolic panel   Result Value Ref Range    Sodium 140 133 - 144 mmol/L    Potassium 4.6 3.4 - 5.3 mmol/L    Chloride 108 94 - 109 mmol/L    Carbon Dioxide 24 20 - 32 mmol/L    Anion Gap 8 3 - 14 mmol/L    Glucose 86 70 - 99 mg/dL    Urea Nitrogen 20 7 - 30 mg/dL    Creatinine 0.62 0.52 - 1.04 mg/dL    GFR Estimate 89 >60 mL/min/[1.73_m2]    GFR Estimate If Black >90 >60 mL/min/[1.73_m2]    Calcium 9.0 8.5 - 10.1 mg/dL    Bilirubin Total 0.3 0.2 - 1.3 mg/dL    Albumin 3.6 3.4 - 5.0 g/dL    Protein Total 6.9 6.8 - 8.8 g/dL    Alkaline Phosphatase 59 40 - 150 U/L    ALT 13 0 - 50 U/L    AST 13 0 - 45 U/L   Lipid panel reflex to direct LDL Fasting   Result Value Ref Range    Cholesterol 210 (H) <200 mg/dL    Triglycerides 156 (H) <150 mg/dL    HDL Cholesterol 53 >49 mg/dL    LDL Cholesterol Calculated 126 (H) <100 mg/dL    Non HDL Cholesterol 157 (H) <130 mg/dL   CBC with platelets   Result Value Ref Range    WBC 6.1 4.0 - 11.0 10e9/L    RBC Count 4.09 3.8 - 5.2 10e12/L    Hemoglobin 13.9 11.7 - 15.7 g/dL    Hematocrit 41.6 35.0 - 47.0 %     (H) 78 - 100 fl    MCH 34.0 (H) 26.5 - 33.0 pg    MCHC 33.4 31.5 - 36.5 g/dL    RDW 12.5 10.0 - 15.0 %    Platelet Count 209 150 - 450 10e9/L   Vitamin D Deficiency   Result Value Ref Range    Vitamin D Deficiency screening 33 20 - 75 ug/L   Phenytoin level   Result Value Ref Range    Phenytoin Level 5.0 (L) 10 - 20 mg/L       Sincerely,         Joel Daniel Irwin Wegener MD.

## 2019-05-30 LAB
ALBUMIN SERPL-MCNC: 3.6 G/DL (ref 3.4–5)
ALP SERPL-CCNC: 59 U/L (ref 40–150)
ALT SERPL W P-5'-P-CCNC: 13 U/L (ref 0–50)
ANION GAP SERPL CALCULATED.3IONS-SCNC: 8 MMOL/L (ref 3–14)
AST SERPL W P-5'-P-CCNC: 13 U/L (ref 0–45)
BILIRUB SERPL-MCNC: 0.3 MG/DL (ref 0.2–1.3)
BUN SERPL-MCNC: 20 MG/DL (ref 7–30)
CALCIUM SERPL-MCNC: 9 MG/DL (ref 8.5–10.1)
CHLORIDE SERPL-SCNC: 108 MMOL/L (ref 94–109)
CHOLEST SERPL-MCNC: 210 MG/DL
CO2 SERPL-SCNC: 24 MMOL/L (ref 20–32)
CREAT SERPL-MCNC: 0.62 MG/DL (ref 0.52–1.04)
DEPRECATED CALCIDIOL+CALCIFEROL SERPL-MC: 33 UG/L (ref 20–75)
GFR SERPL CREATININE-BSD FRML MDRD: 89 ML/MIN/{1.73_M2}
GLUCOSE SERPL-MCNC: 86 MG/DL (ref 70–99)
HDLC SERPL-MCNC: 53 MG/DL
LDLC SERPL CALC-MCNC: 126 MG/DL
NONHDLC SERPL-MCNC: 157 MG/DL
PHENYTOIN SERPL-MCNC: 5 MG/L (ref 10–20)
POTASSIUM SERPL-SCNC: 4.6 MMOL/L (ref 3.4–5.3)
PROT SERPL-MCNC: 6.9 G/DL (ref 6.8–8.8)
SODIUM SERPL-SCNC: 140 MMOL/L (ref 133–144)
TRIGL SERPL-MCNC: 156 MG/DL

## 2019-06-05 NOTE — NURSING NOTE
Patient identified using two patient identifiers.  Ear exam showing wax occlusion completed by provider.  Solution: warm water was placed in the bilateral ear(s) via irrigation tool: elephant ear       .Meri Clement MA

## 2019-06-25 ENCOUNTER — PATIENT OUTREACH (OUTPATIENT)
Dept: GERIATRIC MEDICINE | Facility: CLINIC | Age: 74
End: 2019-06-25

## 2019-06-27 NOTE — PROGRESS NOTES
[facility] Care Coordination Contact    CC received a message from member's PCA Sandra Rivero inquiring about getting a new hospital bed for member. CC called [facility] and was told member would be eligible for a new bed because her current one was purchased over 5 years ago. She would need a face to face visit with her PCP in order to have insurance cover this. CC left a message with Sandra and gave her this information.   Hillary Romano RN  [facility] Care Coordinator  [phone]

## 2019-07-03 ENCOUNTER — TELEPHONE (OUTPATIENT)
Dept: FAMILY MEDICINE | Facility: CLINIC | Age: 74
End: 2019-07-03

## 2019-07-03 DIAGNOSIS — G89.4 CHRONIC PAIN SYNDROME: ICD-10-CM

## 2019-07-03 NOTE — TELEPHONE ENCOUNTER
Returned call to Sandra, relayed provider message below, patient verbalized understanding.       Team coordinators/MA-please fax signed Rx    Thank You!  Renate Iniguez, RN  Triage Nurse      Faxed  (Tylenol #3) to [facility] [address] [facility] [phone]. I have LVM at [phone] for the patient informing her that the Rx has been faxed to the [facility].       .Meri Clement MA  07/03/2019 at  3:05 PM

## 2019-07-03 NOTE — TELEPHONE ENCOUNTER
Prescription In nurse basket.   Let caregiver know each refill has to be filled within 30 days of the last fill now otherwise it will void the prescription as of July 1st.     Unfortunately we were just notified of this.     Joel Wegener,MD

## 2019-07-03 NOTE — TELEPHONE ENCOUNTER
Dr. Wegener-Please review and print/sign if agree.    Team coordinators/MA-Please fax prescription and notify Sandra when completed.  Consent to communicate on file.    Thank you!  BONITA OjedaN, RN

## 2019-07-03 NOTE — TELEPHONE ENCOUNTER
Reason for Call:  Medication or medication refill:    Do you use a [facility]?  Name of the pharmacy and phone number for the current request:  [facility] on [address] - [phone]    Name of the medication requested: acetaminophen-codeine (TYLENOL #3) 300-30 MG tablet    Other request: Patient's niece says that there's a new law as of 7/1 that states they have to contact PCC for med refill or authorization even though there are refills on file. Please advise, thank you!  Sandra would like a call back once completed.    Can we leave a detailed message on this number? YES    Phone number patient can be reached at: other- [phone]      Best Time: anytime    Call taken on 7/3/2019 at 10:31 AM by Ashely Smith

## 2019-07-04 NOTE — TELEPHONE ENCOUNTER
Clinic Action Needed:No  Reason for Call: Deangelo, pharmacist with [facility] calling on behalf of Ban.  He did not receive fax for T3 order as documented below.  Read provider and clinic notes to pharmacist, they did not receive fax.  Pharmacist took verbal order from me per order in Epic, written today from Dr. Joel Wegener. He indicated that they fill this Rx for her often from Dr. Wegener.  Read T3 order verbatim as written in Epic on 7/3/19.    Routed to: Not routed.    Marilou Connolly, RN  [facility]

## 2019-08-02 DIAGNOSIS — G89.4 CHRONIC PAIN SYNDROME: ICD-10-CM

## 2019-08-02 NOTE — TELEPHONE ENCOUNTER
Reason for Call:  Medication or medication refill:    Do you use a [facility]?  Name of the pharmacy and phone number for the current request:  [facility] on [address] - [phone]    Name of the medication requested: acetaminophen-codeine (TYLENOL #3) 300-30 MG tablet    Other request: pca Sandra Varelamigdalialandry states pt is totally out, was told before she could not request by even a day early so putting in now, would like a call when complete to  today     Can we leave a detailed message on this number? YES    Phone number patient can be reached at: Other phone number:  [phone]    Best Time: asap    Call taken on 8/2/2019 at 10:37 AM by Griselda Ivan

## 2019-08-02 NOTE — TELEPHONE ENCOUNTER
"LOV: 5/29/2019    \"PCA Sandra J Carlos states pt is totally out, was told before she could not request by even a day early so putting in now, would like a call when complete to  today\"    Please advise.    Refills no longer valid.    Med pended Pharmacy loaded    Thanks! Vicky Mcleod RN      "

## 2019-08-09 NOTE — PROGRESS NOTES
Home visit/Soren Risk Assessment/EW screening completed on: 09/27/2017. Client, her brother Pavel (guardian), niece Sandra and CM present at the home visit.     Member resides: Privately owned home no stairs  Member currently receiving the following services: PCA and incontinence supplies    See EMR for a list of client's diagnoses and medications.     Medication management:   Medications reviewed:Yes.   Medication management: Family sets up medication. They also administer client's medications.   Medication understanding:Caregiver has understanding of regimen and is able to complete med set up/administration Yes. MTM offered.    Member Mood/behavior-PHQ9 score: 0 Any needs to f/u with PCP on PHQ9 score.     Plan of Care: Continue PCA services and incontinence supplies.   Follow-Up Plan: Member informed of future contact, plan to f/u with member with a 6 month telephone assessment.  Contact information shared with member and family, encouraged member to call with any questions or concerns prior to this.    See [facility] for further detailed information.  Hillary Romano RN  [facility]   [phone]       OFFICE VISIT      Patient: Shahram Jones Date of Service: 2019   : 1949 MRN: [id number]     SUBJECTIVE:     Chief Complaint   Patient presents with   • Hypertension       HISTORY OF PRESENT ILLNESS:  Shahram Jones is a 70 year old male who presents today for f/u on his hypertension and taking medications daily and tolerating well and denies any dyspnea or chest pain or dizziness or syncopal episodes.  Also needs to have CT chest done.        PAST MEDICAL HISTORY:  History reviewed. No pertinent past medical history.    MEDICATIONS:  Current Outpatient Medications   Medication Sig   • memantine (NAMENDA) 5 MG tablet Take 5 mg by mouth 2 times daily.   • bisoprolol-hydrochlorothiazide (ZIAC) 2.5-6.25 MG per tablet Take 1 tablet by mouth daily. TAKE 1 TABLET DAILY AS DIRECTED   • NIFEdipine CC (ADALAT CC) 60 MG 24 hr tablet Take 1 tablet by mouth daily.   • escitalopram (LEXAPRO) 10 MG tablet Take by mouth daily.   • donepezil (ARICEPT) 10 MG tablet Take 10 mg by mouth nightly.   • traZODone (DESYREL) 50 MG tablet Take 50 mg by mouth nightly.     No current facility-administered medications for this visit.        ALLERGIES:  ALLERGIES:   Allergen Reactions   • Diphtheria-Tetanus Toxoids Dt Other (See Comments)     Unknown       PAST SURGICAL HISTORY:  Past Surgical History:   Procedure Laterality Date   • No past surgeries         FAMILY HISTORY:  History reviewed. No pertinent family history.    SOCIAL HISTORY:  Social History     Tobacco Use   Smoking Status Never Smoker   Smokeless Tobacco Never Used     Social History     Substance and Sexual Activity   Alcohol Use No   • Frequency: Never       Review of Systems   Constitutional: Negative.    HENT: Negative.    Eyes: Negative.    Respiratory: Negative.    Cardiovascular: Negative.    Gastrointestinal: Negative.    Endocrine: Negative.    Genitourinary: Negative.    Musculoskeletal: Negative.    Skin: Negative.    Allergic/Immunologic: Negative.     Neurological: Negative.    Hematological: Negative.          OBJECTIVE:     Visit Vitals  /82   Pulse 58   Temp 98.2 °F (36.8 °C)   Ht 5' 6\" (1.676 m)   Wt 77.3 kg (170 lb 8 oz)   SpO2 97%   BMI 27.52 kg/m²       Physical Exam   Constitutional: He is oriented to person, place, and time. He appears well-developed and well-nourished. No distress.   HENT:   Head: Normocephalic and atraumatic.   Right Ear: External ear normal.   Left Ear: External ear normal.   Nose: Nose normal.   Mouth/Throat: Oropharynx is clear and moist. No oropharyngeal exudate.   Eyes: Pupils are equal, round, and reactive to light. Conjunctivae and EOM are normal. Right eye exhibits no discharge. Left eye exhibits no discharge. No scleral icterus.   Neck: Normal range of motion. Neck supple. No thyromegaly present.   Cardiovascular: Normal rate, regular rhythm, normal heart sounds and intact distal pulses.   No murmur heard.  Pulmonary/Chest: Effort normal and breath sounds normal. He has no wheezes. He has no rales.   Abdominal: Soft. Bowel sounds are normal. He exhibits no distension.   Neurological: He is alert and oriented to person, place, and time.   Skin: Skin is warm. No rash noted. He is not diaphoretic. No erythema.   Vitals reviewed.         DIAGNOSTIC STUDIES:   LAB RESULTS:    No visits with results within 1 Month(s) from this visit.   Latest known visit with results is:   Office Visit on 02/04/2019   Component Date Value Ref Range Status   • WBC 02/04/2019 6.0  4.2 - 11.0 K/mcL Final   • RBC 02/04/2019 5.07  4.50 - 5.90 mil/mcL Final   • HGB 02/04/2019 15.7  13.0 - 17.0 g/dL Final   • HCT 02/04/2019 50.0  39.0 - 51.0 % Final   • MCV 02/04/2019 98.6  78.0 - 100.0 fl Final   • MCH 02/04/2019 31.0  26.0 - 34.0 pg Final   • MCHC 02/04/2019 31.4* 32.0 - 36.5 g/dL Final   • RDW-CV 02/04/2019 12.8  11.0 - 15.0 % Final   • PLT 02/04/2019 290  140 - 450 K/mcL Final   • NRBC 02/04/2019 0  0 /100 WBC Final   • DIFF TYPE 02/04/2019  AUTOMATED DIFFERENTIAL   Final   • Neutrophil 02/04/2019 53  % Final   • LYMPH 02/04/2019 37  % Final   • MONO 02/04/2019 8  % Final   • EOSIN 02/04/2019 1  % Final   • BASO 02/04/2019 1  % Final   • Percent Immature Granuloctyes 02/04/2019 0  % Final   • Absolute Neutrophil 02/04/2019 3.1  1.8 - 7.7 K/mcL Final   • Absolute Lymph 02/04/2019 2.2  1.0 - 4.0 K/mcL Final   • Absolute Mono 02/04/2019 0.5  0.3 - 0.9 K/mcL Final   • Absolute Eos 02/04/2019 0.1  0.1 - 0.5 K/mcL Final   • Absolute Baso 02/04/2019 0.0  0.0 - 0.3 K/mcL Final   • Absolute Immature Granulocytes 02/04/2019 0.0  0 - 0.2 K/mcl Final   • Fasting Status 02/04/2019 UNKNOWN  hrs Final   • Sodium 02/04/2019 143  135 - 145 mmol/L Final   • Potassium 02/04/2019 3.6  3.4 - 5.1 mmol/L Final   • Chloride 02/04/2019 107  98 - 107 mmol/L Final   • Carbon Dioxide 02/04/2019 30  21 - 32 mmol/L Final   • Anion Gap 02/04/2019 10  10 - 20 mmol/L Final   • Glucose 02/04/2019 101* 65 - 99 mg/dL Final   • BUN 02/04/2019 12  6 - 20 mg/dL Final   • Creatinine 02/04/2019 1.00  0.67 - 1.17 mg/dL Final   • GFR Estimate,  02/04/2019 89   Final   • GFR Estimate, Non  02/04/2019 77   Final   • BUN/Creatinine Ratio 02/04/2019 12  7 - 25 Final   • CALCIUM 02/04/2019 9.4  8.4 - 10.2 mg/dL Final   • TOTAL BILIRUBIN 02/04/2019 0.4  0.2 - 1.0 mg/dL Final   • AST/SGOT 02/04/2019 18  <38 Units/L Final   • ALT/SGPT 02/04/2019 25  <79 Units/L Final   • ALK PHOSPHATASE 02/04/2019 88  45 - 117 Units/L Final   • TOTAL PROTEIN 02/04/2019 7.9  6.4 - 8.2 g/dL Final   • Albumin 02/04/2019 3.7  3.6 - 5.1 g/dL Final   • GLOBULIN 02/04/2019 4.2* 2.0 - 4.0 g/dL Final   • A/G Ratio, Serum 02/04/2019 0.9* 1.0 - 2.4 Final             ASSESSMENT AND PLAN:   This is a 70 year old year-old male who presents with     1. Essential hypertension    2. Thoracic aortic aneurysm without rupture (CMS/HCC)        Problem List Items Addressed This Visit        Circulatory     Essential hypertension - Primary     Hypertension is improving with treatment.  Continue current treatment regimen.  Dietary sodium restriction.  Regular aerobic exercise.  Continue current medications.  Blood pressure will be reassessed in 3 months.         Aneurysm of thoracic aorta (CMS/HCC)     Advised to get CT chest               No follow-ups on file.        Instructions provided as documented in the AVS.  Medication use,effects and side effects discussed in detail with patient.  The patient indicated understanding of the diagnosis and agreed with the plan of care.    Patient expresses understanding of the plan.  Proper usage and side effects of medications reviewed & discussed.  Refer to orders.  Return to clinic as clinically indicated as discussed with patient who verbalized understanding of & agreement with the plan.    Entered by Sushma M Raghavendra, MD

## 2019-08-16 ENCOUNTER — PATIENT OUTREACH (OUTPATIENT)
Dept: GERIATRIC MEDICINE | Facility: CLINIC | Age: 74
End: 2019-08-16

## 2019-08-16 NOTE — PROGRESS NOTES
[facility] Care Coordination Contact    Called family Sandra Rivero, member's niece to schedule annual HRA home visit. Left a message requesting a return call to schedule HRA.   Hillary Romano RN  [facility] Care Coordinator  [phone]

## 2019-08-20 DIAGNOSIS — G89.4 CHRONIC PAIN SYNDROME: ICD-10-CM

## 2019-08-21 ENCOUNTER — TELEPHONE (OUTPATIENT)
Dept: GERIATRIC MEDICINE | Facility: CLINIC | Age: 74
End: 2019-08-21

## 2019-08-21 ENCOUNTER — PATIENT OUTREACH (OUTPATIENT)
Dept: GERIATRIC MEDICINE | Facility: CLINIC | Age: 74
End: 2019-08-21

## 2019-08-21 DIAGNOSIS — G40.909 SEIZURE DISORDER (H): ICD-10-CM

## 2019-08-21 RX ORDER — GABAPENTIN 400 MG/1
CAPSULE ORAL
Qty: 180 CAPSULE | Refills: 3 | Status: SHIPPED | OUTPATIENT
Start: 2019-08-21 | End: 2020-02-11 | Stop reason: CLARIF

## 2019-08-21 NOTE — TELEPHONE ENCOUNTER
Requested Prescriptions   Pending Prescriptions Disp Refills     gabapentin (NEURONTIN) 400 MG capsule [Pharmacy Med Name: GABAPENTIN  Last Written Prescription Date:  5/8/19  Last Fill Quantity: 180,  # refills: 0   Last office visit: 5/29/2019 with prescribing provider:     Future Office Visit:     400MG CAPSULES] 180 capsule 0     Sig: TAKE 1 CAPSULE(400 MG) BY MOUTH TWICE DAILY       There is no refill protocol information for this order

## 2019-08-22 NOTE — PROGRESS NOTES
[facility] Care Coordination Contact  CC received notification of Emergency Room visit.  [place] [facility] [place] and abdominal pain.    CC contacted family marilny Flores and reviewed discharge summary.  Member has a follow-up appointment with PCP: Yes: scheduled on 08/22  Member has had a change in condition: No  New referrals placed: No  Home Visit Needed: No  Care plan reviewed and updated.  PCP notified of ED visit via EMR.  Hillary Romano RN  [facility] Care Coordinator  [phone]

## 2019-08-22 NOTE — TELEPHONE ENCOUNTER
"Requested Prescriptions   Pending Prescriptions Disp Refills     levETIRAcetam (KEPPRA XR) 750 MG 24 hr tablet [Pharmacy Med Name: LEVETIRACETAM ER 750MG TABLETS] 90 tablet 0     Sig: TAKE 1 TABLET BY MOUTH DAILY  Last Written Prescription Date:  3/5/2018  Last Fill Quantity: 90 tablet,  # refills: 1   Last Office Visit: 5/29/2019   Future Office Visit:            Anti-Seizure Meds Protocol  Failed - 8/21/2019  4:48 PM        Failed - Review Authorizing provider's last note.      Refer to last progress notes: confirm request is for original authorizing provider (cannot be through other providers).        Passed - Recent (12 mo) or future (30 days) visit within the authorizing provider's specialty     Patient had office visit in the last 12 months or has a visit in the next 30 days with authorizing provider or within the authorizing provider's specialty.  See \"Patient Info\" tab in inbasket, or \"Choose Columns\" in Meds & Orders section of the refill encounter.            Passed - Normal CBC on file in past 26 months     Recent Labs   Lab Test 05/29/19  1539   WBC 6.1   RBC 4.09   HGB 13.9   HCT 41.6              Passed - Normal serum creatinine on file in past 26 months     Recent Labs   Lab Test 05/29/19  1539   CR 0.62           Passed - Normal ALT or AST on file in past 26 months     Recent Labs   Lab Test 05/29/19  1539   ALT 13     Recent Labs   Lab Test 05/29/19  1539   AST 13           Passed - Normal platelet count on file in past 26 months     Recent Labs   Lab Test 05/29/19  1539              Passed - Medication is active on med list        Passed - No active pregnancy on record        Passed - No positive pregnancy test in last 12 months          "

## 2019-08-22 NOTE — PROGRESS NOTES
[facility] Care Coordination Contact    Called marilyn Rivero to schedule annual HRA home visit. Home visit scheduled for 09/04/19.  Hillary Romano RN  [facility] Care Coordinator  [phone]

## 2019-08-23 NOTE — TELEPHONE ENCOUNTER
HW Reception: please contact patient, last Rx sent 03/05/2018 #90/1 refill. Has patient been taking this medication? If so, how? Did she request refill?    Thank You!  Renate Iniguez RN  Triage Nurse

## 2019-08-29 DIAGNOSIS — G89.4 CHRONIC PAIN SYNDROME: ICD-10-CM

## 2019-08-29 NOTE — TELEPHONE ENCOUNTER
Reason for Call:  Medication or medication refill:    Do you use a [facility]?  Name of the pharmacy and phone number for the current request:  [facility] on [address] - [phone]    Name of the medication requested: Tylenol # 3    Other request: patient is asking if this could be dated to start on Sunday 9-1-19 because of the pharmacy being closed on that Monday labor day    Can we leave a detailed message on this number? YES    Phone number patient can be reached at: Other phone number:  [phone]    Best Time:     Call taken on 8/29/2019 at 4:13 PM by Janay Martinez

## 2019-08-30 NOTE — TELEPHONE ENCOUNTER
RX has refills, patient just needs OK to fill a day early as the pharmacy is closed on Labor Day.  Oneyda Rain RN

## 2019-09-03 ENCOUNTER — OFFICE VISIT (OUTPATIENT)
Dept: FAMILY MEDICINE | Facility: CLINIC | Age: 74
End: 2019-09-03
Payer: COMMERCIAL

## 2019-09-03 VITALS
DIASTOLIC BLOOD PRESSURE: 74 MMHG | SYSTOLIC BLOOD PRESSURE: 125 MMHG | TEMPERATURE: 98 F | RESPIRATION RATE: 14 BRPM | OXYGEN SATURATION: 94 % | HEART RATE: 72 BPM

## 2019-09-03 DIAGNOSIS — R13.12 OROPHARYNGEAL DYSPHAGIA: ICD-10-CM

## 2019-09-03 DIAGNOSIS — F09 COGNITIVE DYSFUNCTION: ICD-10-CM

## 2019-09-03 DIAGNOSIS — N30.00 ACUTE CYSTITIS WITHOUT HEMATURIA: ICD-10-CM

## 2019-09-03 DIAGNOSIS — G81.01 FLACCID HEMIPLEGIA OF RIGHT DOMINANT SIDE DUE TO NONCEREBROVASCULAR ETIOLOGY (H): ICD-10-CM

## 2019-09-03 DIAGNOSIS — L89.311 PRESSURE INJURY OF RIGHT BUTTOCK, STAGE 1: ICD-10-CM

## 2019-09-03 DIAGNOSIS — M54.6 ACUTE LEFT-SIDED THORACIC BACK PAIN: Primary | ICD-10-CM

## 2019-09-03 DIAGNOSIS — S06.9X9S TRAUMATIC BRAIN INJURY WITH LOSS OF CONSCIOUSNESS, SEQUELA (H): ICD-10-CM

## 2019-09-03 DIAGNOSIS — G40.909 SEIZURE DISORDER (H): ICD-10-CM

## 2019-09-03 PROCEDURE — 99214 OFFICE O/P EST MOD 30 MIN: CPT | Performed by: FAMILY MEDICINE

## 2019-09-03 RX ORDER — LEVETIRACETAM 750 MG/1
TABLET, FILM COATED, EXTENDED RELEASE ORAL
Qty: 135 TABLET | Refills: 3 | Status: SHIPPED | OUTPATIENT
Start: 2019-09-03 | End: 2019-10-02

## 2019-09-03 NOTE — PROGRESS NOTES
"Subjective     Ban Petersen is a 74 year old female who presents to clinic today for the following health issues:    HPI   ED/UC Followup:    Facility:  [facility]  Date of visit: 8/19/2019  Reason for visit: pulled muscle   Current Status: feels little better            Acute left-sided thoracic back pain  Acute cystitis without hematuria: Ban was seen in the emergency room for what she describes today actually is a \"muscle pull\" in her left mid lower back and also her left abdominal wall.  She states her symptoms have resolved she is here with her longtime caregiver and niece.  She was given antibiotics which she completed for urinary tract infection although they both attribute her feeling better simply to time she is not having fevers she is not having dysuria from this regard is feeling back to normal.  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  She does have a history of traumatic brain injury/stroke with right hemiparesis and paralysis.  She does not have postural stability and also has some dysphasia.  She is recently had a small pressure sore overlying her right ischium.  Her niece cares for this closely there was some erythema but recently she had noticed that there was slight breakdown in the skin.  She is wanting a prescription for an appropriate dressing for this, Telfa pads do not seem to stick well.  Also she notes today that although Ban was prescribed a hospital bed in the past (electric) it is broken and no longer works.  They also have an old mattress on it and she does not feel this is adequate anymore.  She continues to do frequent position changes for Ban and keep her up out of bed " as much as possible but she does feel the mattress is contributing to the pressure pressure sore.        Problem list, Medication list, Allergies, and Medical/Social/Surgical histories reviewed in [facility] and updated as appropriate.  Labs reviewed in EPIC  BP Readings from Last 3 Encounters:   09/03/19 125/74   05/29/19 138/85   12/11/18 130/74    Wt Readings from Last 3 Encounters:   10/09/18 68.5 kg (151 lb)   07/12/18 68.7 kg (151 lb 8 oz)   07/09/18 68.5 kg (151 lb)                  Patient Active Problem List   Diagnosis     Epilepsy (H)     Flaccid hemiplegia (H)     Family history of osteoporosis     Hemiparesis (H)     IBS (irritable bowel syndrome)     CARDIOVASCULAR SCREENING; LDL GOAL LESS THAN 160     Abdominal pain, unspecified abdominal location     Cholelithiasis with obstruction     Health Care Home     Advanced directives, counseling/discussion     Hip pain     Physical deconditioning     Seizure disorder (H)     Spondylosis of lumbar region without myelopathy or radiculopathy     Chronic pain syndrome     Brain injury, with LOC > 24 hr without return to prior conscious level, patient surviving, sequela     Oropharyngeal dysphagia     Cognitive dysfunction     Aphasia     Past Surgical History:   Procedure Laterality Date     C NONSPECIFIC PROCEDURE      Cholecystectomy     CHOLECYSTECTOMY       ENDOSCOPIC RETROGRADE CHOLANGIOPANCREATOGRAM WITH DIRECT VISUALIZATION SYSTEM AND GENERATOR  5/2/2011    Procedure:ENDOSCOPIC RETROGRADE CHOLANGIOPANCREATOGRAM WITH DIRECT VISUALIZATION SYSTEM AND GENERATOR; with Spyglass, Exchange of Bile Duct Stent, Removal of Bile Duct Stone ; Surgeon:PAIGE MAHER; Location:[facility] OR     ENDOSCOPIC RETROGRADE CHOLANGIOPANCREATOGRAM WITH SPYGLASS  4/1/2011    Procedure:ENDOSCOPIC RETROGRADE CHOLANGIOPANCREATOGRAM WITH SPYGLASS; Biliary Spincterotomy, Endo retrograde insertion of stent into bile duct, Endo retrograde balloon dilation of bilary ducts, Electryhydraulic Lithotripsy;  Surgeon:PAIGE MAHER; Location: OR     ESOPHAGOSCOPY, GASTROSCOPY, DUODENOSCOPY (EGD), COMBINED  2011    Procedure:COMBINED ESOPHAGOSCOPY, GASTROSCOPY, DUODENOSCOPY (EGD), REMOVE FOREIGN BODY; Surgeon:PAIGE MAHER; Location:[facility] GI     gall stone  2011    only took out gallstone, not gallbladder       Social History     Tobacco Use     Smoking status: Former Smoker     Packs/day: 1.50     Years: 19.00     Pack years: 28.50     Types: Cigarettes     Last attempt to quit: 10/22/1985     Years since quittin.8     Smokeless tobacco: Never Used   Substance Use Topics     Alcohol use: No     No family history on file.      Current Outpatient Medications   Medication Sig Dispense Refill     Acetaminophen (TYLENOL PO) Take 975 mg by mouth every 6 hours as needed for mild pain or fever       acetaminophen-codeine (TYLENOL #3) 300-30 MG tablet Take 2 tablets by mouth 2 times daily And 1-2 tab at noon, #160/month 160 tablet 5     Calcium Carb-Cholecalciferol 600-200 MG-UNIT TABS Take 1 tablet by mouth daily       gabapentin (NEURONTIN) 400 MG capsule TAKE 1 CAPSULE(400 MG) BY MOUTH TWICE DAILY 180 capsule 3     levETIRAcetam (KEPPRA XR) 750 MG 24 hr tablet Takes 1/2 tablet daily 135 tablet 3     LORazepam (ATIVAN) 0.5 MG tablet Take 30 minutes prior to eye visits with injections as needed, may repeat in 1-4 hours as needed. 30 tablet 1     multivitamin, therapeutic with minerals (MULTI-VITAMIN) TABS Take 1 tablet by mouth 2 times daily Ocuvite       ORDER FOR DME One pair of knee high compression stockings of medium compression.  Mid-calf circumference is 30cm. 1 each 0     Phenytoin Sodium Extended 200 MG CAPS Take 200 mg by mouth daily 90 capsule 3     polyethylene glycol (MIRALAX/GLYCOLAX) Packet Take 17 g by mouth daily as needed for constipation       senna-docusate (SENOKOT-S;PERICOLACE) 8.6-50 MG per tablet Take 1 tablet by mouth daily       Allergies   Allergen Reactions     No Known Drug Allergies       Recent Labs   Lab Test 05/29/19  1539 06/13/18 04/10/18  1647 09/26/17  1603 09/29/16  1620  11/03/12  0633   A1C  --   --   --   --   --   --  5.0   *  --   --  130* 128*   < > 100   HDL 53  --   --  54 59   < > 55   TRIG 156*  --   --  248* 165*   < > 174*   ALT 13  --  12 19 15   < >  --    CR 0.62  --  0.71 0.60 0.57   < >  --    GFRESTIMATED 89  --  81 >90 >90  Non African American GFR Calc     < >  --    GFRESTBLACK >90  --  >90 >90 >90  African American GFR Calc     < >  --    POTASSIUM 4.6  --  4.9 4.6 4.5   < >  --    TSH  --  3.81  --   --   --   --   --     < > = values in this interval not displayed.        ROS:  Constitutional, HEENT, cardiovascular, pulmonary, GI, , musculoskeletal, neuro, skin, endocrine and psych systems are negative, except as otherwise noted.        OBJECTIVE:  /74 (BP Location: Right arm, Patient Position: Sitting, Cuff Size: Adult Regular)   Pulse 72   Temp 98  F (36.7  C) (Oral)   Resp 14   SpO2 94%     EXAM:  GENERAL APPEARANCE: healthy, alert and no distress  RESP: lungs clear to auscultation - no rales, rhonchi or wheezes  CV: regular rates and rhythm, normal S1 S2, no S3 or S4 and no murmur, click or rub -  ABDOMEN:  soft, nontender, no HSM or masses and bowel sounds normal    Initially she is sitting in a wheelchair today and pushed by her niece she does tend to lean towards the left side and cannot stay sitting upright on her own.  She is strapped in with a chest and lap belt today.  She has slightly dysarthric speech.  She is unable to lift her right arm and unable to lift her right leg or flex or extend her knee.  Her right arm is held in flexion at her right side.  She is able to move her left arm and leg some although really only has 3 out of 5 strength.    She is able to stand with assistance and leaning against the exam table with her wheelchair carefully behind her today in case she falls.  She was somewhat wobbly when doing so but I was able  to safely examine her bottom.  She does have a 1 cm right-sided stage I pressure sore which extends just through the superficial skin on her buttocks overlying the right ischium.  There is no significant spreading erythema in this area.  The wound appears clean without any significant discharge.      ASSESSMENT AND PLAN  Patient Instructions   Pressure sore on bottom, healing.    Will continue to cover with dry dressing.    Needing new hospital bed with pressure sore mattress - I will order through [facility] medical.  She would need a fully functional electric hospital bed that raises and lowers to help with positioning and transfers.  It should have safety rails.  It should also have a head that is able to go up and down both to help with positioning to prevent pressure sores and also to decrease aspiration risk when secretions are high.  It should have a special mattress to prevent pressure sores we will need to investigate the best option for this I will have my triage team call [facility] medical to inquire about this.    Will order home physical therapy for postural weakness and stengthening and also wound evaluation     Discussed new refill laws regarding tylenol with codeine - need to be filled within 30 days of last fill.     Plan to follow up jan 2019 for next pain medication refill.     I spent greater than 1/2 of a 25 minute face to face encounter counseling regarding the rational for the assessment and plan noted above.       ICD-10-CM    1. Acute left-sided thoracic back pain M54.6    2. Acute cystitis without hematuria N30.00    3. Traumatic brain injury with loss of consciousness, sequela (H) S06.9X9S    4. 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 Oropharyngeal dysphagia R13.12    6. Cognitive dysfunction F09    7. Pressure injury of right buttock, stage 1 L89.311    8. Seizure disorder (H) G40.909 levETIRAcetam (KEPPRA XR) 750 MG 24 hr tablet              Joel Wegener, MD

## 2019-09-03 NOTE — PATIENT INSTRUCTIONS
Pressure sore on bottom, healing.    Will continue to cover with dry dressing.    Needing new hospital bed with pressure sore mattress - I will order through  home medical.    Will order home physical therapy and also wound evaluation     Discussed new refill laws regarding tylenol with codeine - need to be filled within 30 days of last fill.     Plan to follow up jan 2019 for next pain medication refill.

## 2019-09-04 ENCOUNTER — PATIENT OUTREACH (OUTPATIENT)
Dept: GERIATRIC MEDICINE | Facility: CLINIC | Age: 74
End: 2019-09-04

## 2019-09-04 ASSESSMENT — ACTIVITIES OF DAILY LIVING (ADL)
DEPENDENT_IADLS:: CLEANING;COOKING;LAUNDRY;SHOPPING;MEAL PREPARATION;MEDICATION MANAGEMENT;MONEY MANAGEMENT;TRANSPORTATION;INCONTINENCE

## 2019-09-05 ENCOUNTER — TELEPHONE (OUTPATIENT)
Dept: FAMILY MEDICINE | Facility: CLINIC | Age: 74
End: 2019-09-05

## 2019-09-05 ENCOUNTER — DOCUMENTATION ONLY (OUTPATIENT)
Dept: OTHER | Facility: CLINIC | Age: 74
End: 2019-09-05

## 2019-09-05 NOTE — TELEPHONE ENCOUNTER
Triage, please see my last note.  Wen needs a fully electric hospital bed with an appropriate pressure sore mattress.  Does [facility] have any recommendations?    The family feels they do not need one with an air pump but would like an adequate mattress that would go with a hospital bed.    If possible please get their recommendations and have them fax me any order forms that need to be filled out.    Joel Wegener,MD

## 2019-09-05 NOTE — PROGRESS NOTES
[facility] Care Coordination Contact    [facility] Home Visit Assessment     Home visit for Health Risk Assessment with Ban Petersen completed on September 4, 2019    Type of residence:: Private home - no stairs  Current living arrangement:: I live in a private home with family          Current Care Plan  Member currently receiving the following home care services:   None  Member currently receiving the following community resources: PCA, DME      Medication Review  Medication reconciliation completed in Epic: Yes  Medication set-up & administration: Family/informal caregiver sets up weekly.  Family caregiver administers medications.  Medication Risk Assessment Medication (1 or more, place referral to MTM): N/A: No risk factors identified  MTM Referral Placed: No: Member is not able    Mental/Behavioral Health   Depression Screening: See PHQ assessment flowsheet.   Mental health DX:: No      Mental Health Diagnosis: No  Mental Health Services: None: No further intervention needed at this time.    Falls Assessment:   Fallen 2 or more times in the past year?: No   Any fall with injury in the past year?: No    ADL/IADL Dependencies:   Dependent ADLs:: Wheelchair-with assist, Transfers, Positioning, Toileting, Bathing, Dressing, Eating, Grooming  Dependent IADLs:: Cleaning, Cooking, Laundry, Shopping, Meal Preparation, Medication Management, Money Management, Transportation, Incontinence    [facility] sponsored benefits: Shared information re: Silver Sneakers/gym memberships, ASA, Calcium +D.    PCA Assessment completed at visit: Yes     Elderly Waiver Eligibility: Yes-will open to EW  Continue current PCA services. Add DME as needed.    See CC for detailed assessment information.    Follow-Up Plan: Member informed of future contact, plan to f/u with member with a 6 month telephone assessment.  Contact information shared with member and family, encouraged member to call with any questions or  concerns at any time.    [place] care continuum providers: Please refer to Health Care Home on the Epic Problem List to view this patient's [facility] Care Plan Summary.  Hillary Romano RN  [facility] Care Coordinator  [phone]

## 2019-09-06 RX ORDER — LEVETIRACETAM 750 MG/1
TABLET, FILM COATED, EXTENDED RELEASE ORAL
Qty: 90 TABLET | Refills: 0 | OUTPATIENT
Start: 2019-09-06

## 2019-09-06 NOTE — TELEPHONE ENCOUNTER
I also tried to call  [facility]. Waiting on hold x2 for customer service help.  I did not speak with anyone and not able to leave a VM as an option.  Recall.  SERA Summers

## 2019-09-06 NOTE — TELEPHONE ENCOUNTER
[facility] center called. Unable to get through. Will try later  [phone]       Renata Burrell, RN, BSN

## 2019-09-09 ENCOUNTER — DOCUMENTATION ONLY (OUTPATIENT)
Dept: FAMILY MEDICINE | Facility: CLINIC | Age: 74
End: 2019-09-09

## 2019-09-09 NOTE — PROGRESS NOTES
[facility] now requests orders and shares plan of care/discharge summaries for some patients through [employer].  Please REPLY TO THIS MESSAGE OR ROUTE BACK TO THE AUTHOR in order to give authorization for orders when needed.  This is considered a verbal order, you will still receive a faxed copy of orders for signature.  Thank you for your assistance in improving collaboration for our patients.    Home care PT evaluation completed 9/9.     ORDER    Continue PT  1x/week for 1 week and 2x/week for 2 weeks for functional strengthening, mobility and caregiver training.    OT eval and treat for ADLs, IADLS, assist with equipment needs, assess need for HHA.    SN eval and treat, wound and medication management

## 2019-09-10 NOTE — TELEPHONE ENCOUNTER
I talked to [facility]. They don't carry any hospital beds.  They referred me to [employer] [facility] [employer] at [phone]    From [employer]- very helpful.  Fully electric bed is NOT billable to insurance.    The semi electric is billable to insurance. Head and foot is electric. Height of bed is manual crank.    For the mattress, there are 4 options.  1- foam.  2- inner spring.  3- group 1  4- group 2 mattress.    Depending on the pt's insurance, certain forms need to be done.  I printed out basic forms from [employer] to help with mattress decision.    They would need written order for bed and mattress and office visit notes.  It they need different forms, they would follow up with clinic.  I gave the form copies to EDILBERTO Summers RN

## 2019-09-11 ENCOUNTER — DOCUMENTATION ONLY (OUTPATIENT)
Dept: FAMILY MEDICINE | Facility: CLINIC | Age: 74
End: 2019-09-11

## 2019-09-11 ENCOUNTER — TELEPHONE (OUTPATIENT)
Dept: FAMILY MEDICINE | Facility: CLINIC | Age: 74
End: 2019-09-11

## 2019-09-11 RX ORDER — ACETAMINOPHEN 325 MG/1
325 TABLET ORAL EVERY 6 HOURS PRN
COMMUNITY
Start: 2019-09-11 | End: 2020-02-11 | Stop reason: DRUGHIGH

## 2019-09-11 NOTE — TELEPHONE ENCOUNTER
Writer called Nasma and phone seemed to be answered but there was loud static noise on other end and writer could not hear anyone speaking.    Recall at another time.    MARAL Sanderson, BONITAN, RN

## 2019-09-11 NOTE — TELEPHONE ENCOUNTER
Please add note to t#3 and also acetaminophen  - do not exceed 2,000mg acetaminophen in 24 hours.     Joel Wegener,MD

## 2019-09-11 NOTE — PROGRESS NOTES
[facility] now requests orders and shares plan of care/discharge summaries for some patients through [employer].  Please REPLY TO THIS MESSAGE OR ROUTE BACK TO THE AUTHOR in order to give authorization for orders when needed.  This is considered a verbal order, you will still receive a faxed copy of orders for signature.  Thank you for your assistance in improving collaboration for our patients.    ORDER    SN visits 2 week 1, 1 week 4, 2 PRNs  For wound  assess andcare, meds and disease management    Right gluteus  wounds care order.  Clean and apply Zinc Oxide barrier ointment/ Calmoseptine to wound and surround skin twice a day and as needed.

## 2019-09-11 NOTE — PROGRESS NOTES
[facility] now requests orders and shares plan of care/discharge summaries for some patients through [employer].  Please REPLY TO THIS MESSAGE OR ROUTE BACK TO THE AUTHOR in order to give authorization for orders when needed.  This is considered a verbal order, you will still receive a faxed copy of orders for signature.  Thank you for your assistance in improving collaboration for our patients.        MD SUMMARY/PLAN OF CARE    Upon review of medication list the following interactions/duplications were noted. Please advise if changes need to be made.      acetaminophen 300 mg-codeine 15 mg tablet, 2 tab daily  WITH Tylenol 325 mg tablet 3 tab Every 6 hours PRN    Multiple Anticonvulsants  lorazepam 0.5 mg tablet, 1 tab Take 30 minutes prior to eye visits with injections as needed, may repeat in 1-4 hours as needed.  gabapentin 400 mg capsule, 1 tab 2 times per day  Keppra 750 mg tablet, 1/2 tab Every day

## 2019-09-11 NOTE — TELEPHONE ENCOUNTER
Needing verbal orders for Home Care:    Skilled nursin times a week for 1 week, then 1 time a week for 4 weeks, and 2 prn visits.  For wound care.    Please call.  OK to DAGMAR on VM.

## 2019-09-12 NOTE — TELEPHONE ENCOUNTER
Writer left detailed message on Nasma's identified voicemail giving verbal order as per below.  BONITA OjedaN, RN

## 2019-09-13 ENCOUNTER — PATIENT OUTREACH (OUTPATIENT)
Dept: GERIATRIC MEDICINE | Facility: CLINIC | Age: 74
End: 2019-09-13

## 2019-09-13 NOTE — LETTER
[facility]  [address] [place] [address], [address]  [place] [address]  Phone:  [phone]  Fax:  [phone]        September 13, 2019    ETIENNE GREWAL  [address] [place] [address]    Dear Etienne,    [place] is a copy of your completed PCA Assessment and Service Plan.  This is for your records and no action is required by you.  If you have additional questions regarding your assessment please contact me at [phone]. If you feel that your needs are not being met, please contact the Clinical Supervisor at [phone].    Sincerely,    Hillary Romano RN, MA    E-mail: [email or web address]  Phone: [phone]      [facility]            Enclosure:  Completed PCA assessment

## 2019-09-13 NOTE — PROGRESS NOTES
[facility] Care Coordination Contact    [facility]:  Emailed completed PCA assessment to [facility].  Faxed copy of PCA assessment to PCA Agency and mailed copy to member.  Faxed MD Communication to PCP.   Melita Lange  Case Management Specialist  [facility]  [phone]

## 2019-09-17 ENCOUNTER — MEDICAL CORRESPONDENCE (OUTPATIENT)
Dept: HEALTH INFORMATION MANAGEMENT | Facility: CLINIC | Age: 74
End: 2019-09-17

## 2019-09-17 NOTE — TELEPHONE ENCOUNTER
Forms completed. Please fax back, give me a copy and send for scanning.     Given to Janay/reception.    Joel Wegener,MD

## 2019-09-19 ENCOUNTER — PATIENT OUTREACH (OUTPATIENT)
Dept: GERIATRIC MEDICINE | Facility: CLINIC | Age: 74
End: 2019-09-19

## 2019-09-19 NOTE — PROGRESS NOTES
[facility] Care Coordination Contact    Order placed with [facility] (p: [phone]; f: [phone]) for washable bed pads, wipes and gloves.  Order placed on 9/19/19. Access updated.  As required, authorization submitted to health plan.  Melita Lange  Case Management Specialist  [facility]  [phone]

## 2019-09-24 ENCOUNTER — TELEPHONE (OUTPATIENT)
Dept: FAMILY MEDICINE | Facility: CLINIC | Age: 74
End: 2019-09-24

## 2019-09-25 ENCOUNTER — DOCUMENTATION ONLY (OUTPATIENT)
Dept: FAMILY MEDICINE | Facility: CLINIC | Age: 74
End: 2019-09-25

## 2019-09-25 PROBLEM — L89.311 PRESSURE INJURY OF RIGHT BUTTOCK, STAGE 1: Status: ACTIVE | Noted: 2019-09-25

## 2019-09-25 PROBLEM — R15.1 FECAL SMEARING: Status: ACTIVE | Noted: 2019-09-25

## 2019-09-26 ENCOUNTER — PATIENT OUTREACH (OUTPATIENT)
Dept: GERIATRIC MEDICINE | Facility: CLINIC | Age: 74
End: 2019-09-26

## 2019-09-26 ENCOUNTER — TELEPHONE (OUTPATIENT)
Dept: FAMILY MEDICINE | Facility: CLINIC | Age: 74
End: 2019-09-26

## 2019-09-26 NOTE — PROGRESS NOTES
[facility] Care Coordination Contact    Received after visit chart from care coordinator.  Completed following tasks: Mailed copy of care plan to client and Updated services in access  Chart was returned to CC.    and Provider Signature - No POC Shared:  Member indicates that they do not want their POC shared with any EW providers.   Melita Lange  Case Management Specialist  [facility]  [phone]

## 2019-09-26 NOTE — LETTER
September 26, 2019      ETIENNE GREWAL  [address]  [place] [address]      Dear Etienne:    At [facility], we are dedicated to improving your health and well-being. Enclosed is the Comprehensive Care Plan that we developed with you on 9/4/19. Please review the Care Plan carefully.    As a reminder, some of the things we discussed at your visit include:    Your physical and mental health    Ways to reduce falls    Health care needs you may have    Don t forget to contact your care coordinator if you:    Have been hospitalized or plan to be hospitalized     Have had a fall     Have experienced a change in physical health    Are experiencing emotional problems     If you do not agree with your Care Plan, have questions about it, or have experienced a change in your needs, please call me at [phone]. If you are hearing impaired, please call the [place] Relay at 479 or [phone] (qyefkc-ct-qkciit relay service).    Sincerely,    Hillary Romano RN, MA    E-mail: [email or web address]  Phone: [phone]      [facility]) is a health plan that contracts with both Medicare and the [place] Medical Assistance (Medicaid) program to provide benefits of both programs to enrollees. Enrollment in [facility] depends on contract renewal.    [id number])     [id number] (11/18)

## 2019-09-26 NOTE — PROGRESS NOTES
[facility] now requests orders and shares plan of care/discharge summaries for some patients through [employer].  Please REPLY TO THIS MESSAGE OR ROUTE BACK TO THE AUTHOR in order to give authorization for orders when needed.  This is considered a verbal order, you will still receive a faxed copy of orders for signature.  Thank you for your assistance in improving collaboration for our patients.    ORDER    Continue home care PT 1x/week for 3 weeks for functional strengthening, mobility training and caregiver education.

## 2019-09-27 DIAGNOSIS — Z53.9 DIAGNOSIS NOT YET DEFINED: Primary | ICD-10-CM

## 2019-09-27 PROCEDURE — G0180 MD CERTIFICATION HHA PATIENT: HCPCS | Performed by: FAMILY MEDICINE

## 2019-10-02 ENCOUNTER — TELEPHONE (OUTPATIENT)
Dept: FAMILY MEDICINE | Facility: CLINIC | Age: 74
End: 2019-10-02

## 2019-10-02 DIAGNOSIS — G40.909 SEIZURE DISORDER (H): Primary | ICD-10-CM

## 2019-10-02 RX ORDER — LEVETIRACETAM 750 MG/1
TABLET ORAL
Qty: 45 TABLET | Refills: 3 | Status: SHIPPED | OUTPATIENT
Start: 2019-10-02 | End: 2020-02-11 | Stop reason: CLARIF

## 2019-10-02 NOTE — TELEPHONE ENCOUNTER
[facility] pharmacy called stating for levETIRAcetam prescription the XR tabs shouldn't be broken and last time pt wasn't on the XR?    Please advise

## 2019-10-02 NOTE — TELEPHONE ENCOUNTER
If this is the case, change to short acting, 1/2 tab daily new prescription sent. Joel Wegener,MD

## 2019-10-03 ENCOUNTER — TELEPHONE (OUTPATIENT)
Dept: FAMILY MEDICINE | Facility: CLINIC | Age: 74
End: 2019-10-03

## 2019-10-03 RX ORDER — PHENYTOIN SODIUM 200 MG/1
200 CAPSULE, EXTENDED RELEASE ORAL DAILY
Qty: 90 CAPSULE | Refills: 3 | Status: SHIPPED | OUTPATIENT
Start: 2019-10-03 | End: 2020-02-11 | Stop reason: CLARIF

## 2019-10-03 NOTE — TELEPHONE ENCOUNTER
Panel Management Review      Patient has the following on her problem list: None      Composite cancer screening  Chart review shows that this patient is due/due soon for the following Mammogram  Summary:    Patient is due/failing the following:   MAMMOGRAM and PHYSICAL    Action needed:   Patient needs office visit for physical and mammo.    Type of outreach:    Sent letter.    Questions for provider review:    None                                                                                                                                    Gina Chan CMA on 10/3/2019 at 2:13 PM

## 2019-10-03 NOTE — TELEPHONE ENCOUNTER
Please check with her pharmacy as well when it was last filled/amount which might help clarify.    Joel Wegener,MD

## 2019-10-03 NOTE — TELEPHONE ENCOUNTER
Called and left message with brother Berry Hughes - no consent to communicate - please return call to clinic and ask to speak with nurse    Johnson is legal guardian - Consent on file for Pavel and Sandra    Called Johnson and gave message - encouraged  today to switch over to correct form      Wegener - guardian stated patient is on two seizure medications - writer notes Phenytoin Sodium Extended 200 MG CAPS - should have run out 4/18/19 - should patient be continuing this medication at this time?

## 2019-10-03 NOTE — LETTER
[facility]   [address] [place] [address]  [phone]        Ban Nicola                                                               Date: 10/3/2019  [address]  [place] [address]           Jorge Cevallos,     We are committed to making sure our patients receive the best quality care, including preventative care.  Part of that commitment includes making sure you are getting your screening tests such as mammograms, colonoscopies, and pap smears on time.  We have noted that you are currently due for the following:      Health Maintenance Due   Topic Date Due     BIA ASSESSMENT  1945     URINE DRUG SCREEN  1945     MAMMO SCREENING  02/09/2002     ZOSTER IMMUNIZATION (2 of 3) 06/04/2009     MEDICARE ANNUAL WELLNESS VISIT  01/16/2011     FIT  03/29/2014     PHQ-9  09/26/2018     INFLUENZA VACCINE (1) 09/01/2019        The Care Team recommends that you please call the clinic at your earliest convenience to schedule an appointment for an Office Visit/Physical. If you have already completed any of the tests listed, please contact us through [employer] or call [phone] so we can update our records.  In addition to the test(s) name, you will be asked to provide the date, location and result.     Please note that if you have not seen your provider in 12 months or longer, a visit will be required before any refills of your medications can be authorized.     We do understand that you may have already discussed some this with your provider.  However, [facility] has an additional Healthcare Team specifically designed to help you complete your regular health maintenance and screening tests.  We apologize in advance for any duplicate messages you may receive and hope you understand that our primary goal is your health and well-being.     Thank you for trusting us with your health care,       Your [facility] Healthcare Team

## 2019-10-03 NOTE — TELEPHONE ENCOUNTER
Wegener - do you want patient to continue with both seizure medications?    Per [facility] [place] [address]    Phenytoin Sodium Extended 200 MG CAPS  Last refill: 10/1  Disp: 60  Refills: 0    Pharmacy says staff re-entered prescription into the system after it had been  and entered in the wrong dates allowing patient to  medication patient written rx date      RX Written 4/10 - first filled 18 - then a break in fills until 18 - then (disp 30), 19 (90), 7/3/19 (90), 10/1 (60)

## 2019-10-04 NOTE — TELEPHONE ENCOUNTER
Signed Prescriptions:                        Disp   Refills    levETIRAcetam (KEPPRA) 750 MG tablet       45 tab*3        Sig: Take 1/2 tablet daily  Authorizing Provider: WEGENER, JOEL DANIEL IRWIN    phenytoin sodium extended (DILANTIN) 200 M*90 cap*3        Sig: Take 1 capsule (200 mg) by mouth daily  Authorizing Provider: WEGENER, JOEL DANIEL IRWIN

## 2019-10-11 NOTE — PROGRESS NOTES
Per [facility], CC notified.  Ban Petersen 1945 washable bed pads Delivered 10/2/19   Ban Petersen 1945 gloves and wipes Delivered 10/2/19     Melita Lange  Case Management Specialist  [facility]  [phone]

## 2019-10-28 ENCOUNTER — TELEPHONE (OUTPATIENT)
Dept: FAMILY MEDICINE | Facility: CLINIC | Age: 74
End: 2019-10-28

## 2019-11-13 ENCOUNTER — PATIENT OUTREACH (OUTPATIENT)
Dept: GERIATRIC MEDICINE | Facility: CLINIC | Age: 74
End: 2019-11-13

## 2019-11-13 NOTE — PROGRESS NOTES
[facility] Care Coordination Contact    CC received notification of discharge to home. Discharge occurred on 11/13/19 from [facility].   CC contacted family Brother Toan and discussed the transition home. Toan had already contacted the PCA agency as PCA was meeting member at home today to help. Member is hemiplegic and needed assist with transfers prior to the fall resulting in shoulder/arm fracture and now Toan reports that member will continue to need more assist. Toan states member did not need surgery. Called [facility] at [phone] to discuss the transition. Member was admitted 11/11/19 and [facility] arranged home care through [facility] at [phone].  Member has a follow-up appointment with PCP in 7 days: Not at this time.    Member has had a change in condition: Yes: New shoulder/arm fracture on paralyzed side of body-right side per Toan.  Home visit needed: No  Care plan reviewed and updated.  The following home based services, PCA were resumed.  New referrals placed by hospital -home care therapies.  Transition log completed.   PCP notified of transition back to home via EMR.  Chloe Henry RN, PHN, [facility] Care Coordination  [phone]  TRANSITIONS OF CARE (GIRSELDA) LOG   GRISELDA tasks should be completed by the CC within one (1) business day of notification of each transition. Follow up contact with member is required after return to their usual care setting.  Note:  If CC finds out about the transitions fifteen (15) days or more after the member has returned to their usual care setting, no GRISELDA log is needed. However, the CC should check in with the member to discuss the transition process, any changes needed to the care plan and document it in a case note.    Member Name:  Ban Petersen [facility] Name:  [facility]/Health Plan Member ID#: [id number]   Product: [facility] Care Coordinator Contact:  Chloe Henry RN for Hillary Romano RN Agency/County/Care System: [facility]    Transition Communication Actions from Care Management Contact   Transition #1   Notification Date: 11/13/19 Transition Date:   11/11/19 Transition From: Home     Is this the member s usual care setting?               yes Transition To: [facility]   Transition Type:  Unplanned  Reason for Admission/Comments:  Fall with shoulder/arm fracture-no surgery required      Shared CC contact info, care plan/services with receiving setting--Date completed: 11/13/19    Notified PCP of transition--Date completed:  11/13/19   via  EMR   Transition #2   Transition #3  (if applicable)   Notification Date: 11/13/19         Transition To:  Home  Transition Date: 11/13/19     Transition Type:    Planned  Notified PCP -- Date completed: 11/13/19              Shared CC contact info, care plan/services with receiving setting or, if applicable, home care agency--Date completed: 11/13/19  *Complete additional tasks below, if this transition is a return to usual care setting.      Comments:    Notification Date:    Transition To:    Transition Date:        Transition Type:      Notified PCP--Date completed:        Shared CC contact info, care plan/services with receiving setting or, if applicable, home care agency--Date completed:      *Complete additional tasks below, if this transition is a return to usual care setting.      Comments:      *Complete tasks below when the member is discharging TO their usual care setting within one (1) business day of notification.  For situations where the Care Coordinator is notified of the discharge prior to the date of discharge, the Care Coordinator must follow up with the member or designated representative to confirm that discharge actually occurred and discuss required GRISELDA tasks as outlined in the GRISELDA Instructions.  (This includes situations where it may be a  new  usual care setting for the member. (i.e., a community member who decides upon permanent nursing home placement  following hospitalization and rehab).    Date completed: 11/13/19  Communicated with member or their designated representative about the following:  care transition process; about changes to the member s health status; plan of care updates; education about transitions and how to prevent unplanned transitions/readmissions  Four Pillars for Optimal Transition:    Check  Yes  - if the member, family member and/or SNF/facility staff manages the following:    If  No  provide explanation in the comments section.          [x]  Yes     []  No     Does the member have a follow-up appointment scheduled with primary care or specialist? (Mental health hospitalizations--the appt. should be w/in 7 days)   [x]  Yes     []  No     Can the member manage their medications or is there a system in place to manage medications (e.g. home care set-up)?         [x]  Yes     []  No     Can the member verbalize warning signs and symptoms to watch for and how to respond?         [x]  Yes     []  No     Does the member use a Personal Health Care Record?  Check  Yes  if visit summary, discharge summary, and/or healthcare summary are being used as a PHR.                                                                                                                                                                                    [x] Yes      [] No      Have you updated the member s care plan?  If  No  provide explanation in comments.   Comments:   Chloe Henyr RN

## 2019-11-14 ENCOUNTER — TELEPHONE (OUTPATIENT)
Dept: FAMILY MEDICINE | Facility: CLINIC | Age: 74
End: 2019-11-14

## 2019-11-14 NOTE — TELEPHONE ENCOUNTER
Reason for Call:  Other call back    Detailed comments: rodrigue states she just wants to make sure provider will follow up with pt for home health services via phone and fax, please advise.    Phone Number Patient can be reached at: Other phone number:  [phone]    Best Time: asap    Can we leave a detailed message on this number? YES    Call taken on 11/14/2019 at 9:06 AM by Griselda Ivan

## 2019-11-14 NOTE — TELEPHONE ENCOUNTER
Viviane -  - [facility] - left message advising yes provider will follow patient with home care

## 2019-11-14 NOTE — TELEPHONE ENCOUNTER
Dr. Wegener-Please advise if you are willing to follow patient while she is with [facility]?    Per chart review, patient was hospitalized on 11/11/19 with right humerus neck fracture.    Thank you!  BONITA OjedaN, RN

## 2019-11-26 ENCOUNTER — TELEPHONE (OUTPATIENT)
Dept: FAMILY MEDICINE | Facility: CLINIC | Age: 74
End: 2019-11-26

## 2019-11-26 NOTE — TELEPHONE ENCOUNTER
Panel Management Review      Patient has the following on her problem list:     Depression / Dysthymia review    Measure:  Needs PHQ-9 score of 4 or less during index window.  Administer PHQ-9 and if score is 5 or more, send encounter to provider for next steps.    5 - 7 month window range:     PHQ-9 SCORE 9/26/2017   PHQ-9 Total Score 0       If PHQ-9 recheck is 5 or more, route to provider for next steps.    Patient is due for:  PHQ9      Composite cancer screening  Chart review shows that this patient is due/due soon for the following Mammogram  Summary:    Patient is due/failing the following:   MAMMOGRAM    Action needed:   PT NEEDS TO SCHEDULE MAMMO     Type of outreach:    Sent letter.    Questions for provider review:    None                                                                                                                                    Gina Chan CMA on 11/26/2019 at 4:29 PM

## 2019-11-26 NOTE — LETTER
[place] [address] [facility] [address] [phone]          2019    Ban Petersen                                                                                                                     [address] [place] [address]          Dear Ban:    [facility] is committed to making sure our patients get the best care, including preventative care.  Part of that commitment includes makin sure you are getting your screening tests, like mammograms, colonoscopy, and pap smears on time.  The [facility] team has noted that you are currently overdue for your mammogram.    If you think you have already had a mammogram in the past 2 years, please let us know so that we can update our records by giving us information such as when and where you had it done and if the results were normal.  You can send us a message on [employer] or call the clinic at [phone] to give us an update.    If you have not had a mammogram, you can call and schedule one at either:    Central [phone]  [place] Program:  [phone]   (offering sliding-scale or free mammograms for the under/uninsured)    You may have already discussed this with your provider, but [facility] has an additional healthcare team specifically designed to help you remember to complete your regular screening tests.  We apologize in advance for any duplicate messages you may receive; we hope you understand that our primary goal is your health.    Your [facility] Healthcare Team.

## 2019-12-03 ENCOUNTER — MEDICAL CORRESPONDENCE (OUTPATIENT)
Dept: HEALTH INFORMATION MANAGEMENT | Facility: CLINIC | Age: 74
End: 2019-12-03

## 2019-12-17 ENCOUNTER — OFFICE VISIT (OUTPATIENT)
Dept: FAMILY MEDICINE | Facility: CLINIC | Age: 74
End: 2019-12-17
Payer: COMMERCIAL

## 2019-12-17 VITALS
RESPIRATION RATE: 16 BRPM | DIASTOLIC BLOOD PRESSURE: 80 MMHG | HEART RATE: 78 BPM | SYSTOLIC BLOOD PRESSURE: 122 MMHG | OXYGEN SATURATION: 97 % | TEMPERATURE: 98.5 F

## 2019-12-17 DIAGNOSIS — G89.4 CHRONIC PAIN SYNDROME: ICD-10-CM

## 2019-12-17 DIAGNOSIS — G40.909 SEIZURE DISORDER (H): ICD-10-CM

## 2019-12-17 DIAGNOSIS — S42.421A CLOSED DISPLACED COMMINUTED SUPRACONDYLAR FRACTURE OF RIGHT HUMERUS WITHOUT INTERCONDYLAR FRACTURE, INITIAL ENCOUNTER: Primary | ICD-10-CM

## 2019-12-17 DIAGNOSIS — I62.00 SUBDURAL HEMORRHAGE (H): ICD-10-CM

## 2019-12-17 DIAGNOSIS — Z79.899 MEDICATION MANAGEMENT: ICD-10-CM

## 2019-12-17 LAB — PHENYTOIN SERPL-MCNC: 4.3 MG/L (ref 10–20)

## 2019-12-17 PROCEDURE — 99000 SPECIMEN HANDLING OFFICE-LAB: CPT | Performed by: FAMILY MEDICINE

## 2019-12-17 PROCEDURE — 99214 OFFICE O/P EST MOD 30 MIN: CPT | Performed by: FAMILY MEDICINE

## 2019-12-17 PROCEDURE — 80177 DRUG SCRN QUAN LEVETIRACETAM: CPT | Mod: 90 | Performed by: FAMILY MEDICINE

## 2019-12-17 PROCEDURE — 36415 COLL VENOUS BLD VENIPUNCTURE: CPT | Performed by: FAMILY MEDICINE

## 2019-12-17 PROCEDURE — 80185 ASSAY OF PHENYTOIN TOTAL: CPT | Performed by: FAMILY MEDICINE

## 2019-12-17 RX ORDER — ACETAMINOPHEN AND CODEINE PHOSPHATE 300; 30 MG/1; MG/1
2 TABLET ORAL 3 TIMES DAILY
Qty: 78 TABLET | Refills: 0 | Status: SHIPPED | OUTPATIENT
Start: 2019-12-21 | End: 2020-02-11 | Stop reason: CLARIF

## 2019-12-17 NOTE — PROGRESS NOTES
Subjective     Ban Petersen is a 74 year old female who presents to clinic today for the following health issues:    [facility]       Hospital Follow-up Visit:    Hospital/Nursing Home/ Rehab Facility: [facility]  Date of Admission: 11/11/2019  Date of Discharge: 11/13/2019  Reason(s) for Admission: Fracture of humerus neck right             Problems taking medications regularly:  None       Medication changes since discharge: None        Problems adhering to non-medication therapy:  None    Summary of hospitalization:  [facility] discharge summary reviewed  Diagnostic Tests/Treatments reviewed.  Follow up needed: ortho  Other Healthcare Providers Involved in Patient s Care:         None  Update since discharge: improved.     Post Discharge Medication Reconciliation: discharge medications reconciled, continue medications without change.  Plan of care communicated with patient, family and caregiver     Coding guidelines for this visit:  Type of Medical   Decision Making Face-to-Face Visit       within 7 Days of discharge Face-to-Face Visit        within 14 days of discharge   Moderate Complexity [id number]   High Complexity 70806 [id number]               Closed displaced comminuted supracondylar fracture of right humerus without intercondylar fracture, initial encounter  Seizure disorder (H)  Chronic pain syndrome  Medication management  Subdural hemorrhage (H) :hospitalized after fall/potentially related to seizure, right humeral fracture, small subdural hematoma.      In sling.  Had ortho follow up today.     Monitored by neurology in hospital and with serial ct scans, now just monitoring symptoms.  Per niece caregiver with her essentially back to baseline except her arm.      Needing to use more T#3 than usual (see plan.)  And wants to make sure we have a good plan for that.     Not in significant pain with that.           Problem list, Medication list, Allergies, and Medical/Social/Surgical histories reviewed in EPIC  and updated as appropriate.  Labs reviewed in EPIC  BP Readings from Last 3 Encounters:   12/17/19 122/80   09/03/19 125/74   05/29/19 138/85    Wt Readings from Last 3 Encounters:   10/09/18 68.5 kg (151 lb)   07/12/18 68.7 kg (151 lb 8 oz)   07/09/18 68.5 kg (151 lb)                  Patient Active Problem List   Diagnosis     Epilepsy (H)     Flaccid hemiplegia (H)     Family history of osteoporosis     Hemiparesis (H)     IBS (irritable bowel syndrome)     CARDIOVASCULAR SCREENING; LDL GOAL LESS THAN 160     Abdominal pain, unspecified abdominal location     Cholelithiasis with obstruction     Health Care Home     Advanced directives, counseling/discussion     Hip pain     Physical deconditioning     Seizure disorder (H)     Spondylosis of lumbar region without myelopathy or radiculopathy     Chronic pain syndrome     Brain injury, with LOC > 24 hr without return to prior conscious level, patient surviving, sequela     Oropharyngeal dysphagia     Cognitive dysfunction     Aphasia     Fecal smearing     Pressure injury of right buttock, stage 1     Past Surgical History:   Procedure Laterality Date     C NONSPECIFIC PROCEDURE      Cholecystectomy     CHOLECYSTECTOMY       ENDOSCOPIC RETROGRADE CHOLANGIOPANCREATOGRAM WITH DIRECT VISUALIZATION SYSTEM AND GENERATOR  5/2/2011    Procedure:ENDOSCOPIC RETROGRADE CHOLANGIOPANCREATOGRAM WITH DIRECT VISUALIZATION SYSTEM AND GENERATOR; with Spyglass, Exchange of Bile Duct Stent, Removal of Bile Duct Stone ; Surgeon:PAIGE MAHER; Location:[facility]     ENDOSCOPIC RETROGRADE CHOLANGIOPANCREATOGRAM WITH SPYGLASS  4/1/2011    Procedure:ENDOSCOPIC RETROGRADE CHOLANGIOPANCREATOGRAM WITH SPYGLASS; Biliary Spincterotomy, Endo retrograde insertion of stent into bile duct, Endo retrograde balloon dilation of bilary ducts, Electryhydraulic Lithotripsy; Surgeon:PAIGE MAHER; Location:[facility] OR     ESOPHAGOSCOPY, GASTROSCOPY, DUODENOSCOPY (EGD), COMBINED  6/2/2011    Procedure:COMBINED  ESOPHAGOSCOPY, GASTROSCOPY, DUODENOSCOPY (EGD), REMOVE FOREIGN BODY; Surgeon:PAIGE MAHER; Location:[facility]     gall stone  2011    only took out gallstone, not gallbladder       Social History     Tobacco Use     Smoking status: Former Smoker     Packs/day: 1.50     Years: 19.00     Pack years: 28.50     Types: Cigarettes     Last attempt to quit: 10/22/1985     Years since quittin.2     Smokeless tobacco: Never Used   Substance Use Topics     Alcohol use: No     History reviewed. No pertinent family history.      Current Outpatient Medications   Medication Sig Dispense Refill     acetaminophen (TYLENOL) 325 MG tablet Take 3 tablets (975 mg) by mouth every 6 hours as needed for mild pain or fever do not exceed 2,000mg acetaminophen in 24 hours       [START ON 1/3/2020] acetaminophen-codeine (TYLENOL #3) 300-30 MG tablet Take 2 tablets by mouth 2 times daily And 1-2 tab at noon, #160/month 160 tablet 5     acetaminophen-codeine (TYLENOL WITH CODEINE #3) 300-30 MG per tablet Take 2 tablets by mouth 3 times daily for 13 days 78 tablet 0     Calcium Carb-Cholecalciferol 600-200 MG-UNIT TABS Take 1 tablet by mouth daily       gabapentin (NEURONTIN) 400 MG capsule TAKE 1 CAPSULE(400 MG) BY MOUTH TWICE DAILY 180 capsule 3     levETIRAcetam (KEPPRA) 750 MG tablet Take 1/2 tablet daily 45 tablet 3     LORazepam (ATIVAN) 0.5 MG tablet Take 30 minutes prior to eye visits with injections as needed, may repeat in 1-4 hours as needed. 30 tablet 1     multivitamin, therapeutic with minerals (MULTI-VITAMIN) TABS Take 1 tablet by mouth 2 times daily Ocuvite       phenytoin sodium extended (DILANTIN) 200 MG capsule Take 1 capsule (200 mg) by mouth daily 90 capsule 3     polyethylene glycol (MIRALAX/GLYCOLAX) Packet Take 17 g by mouth daily as needed for constipation       senna-docusate (SENOKOT-S;PERICOLACE) 8.6-50 MG per tablet Take 1 tablet by mouth daily       ORDER FOR DME One pair of knee high compression stockings of  medium compression.  Mid-calf circumference is 30cm. (Patient not taking: Reported on 12/17/2019) 1 each 0     Allergies   Allergen Reactions     No Known Drug Allergies      Recent Labs   Lab Test 05/29/19  1539 06/13/18 04/10/18  1647 09/26/17  1603 09/29/16  1620  11/03/12  0633   A1C  --   --   --   --   --   --  5.0   *  --   --  130* 128*   < > 100   HDL 53  --   --  54 59   < > 55   TRIG 156*  --   --  248* 165*   < > 174*   ALT 13  --  12 19 15   < >  --    CR 0.62  --  0.71 0.60 0.57   < >  --    GFRESTIMATED 89  --  81 >90 >90  Non African American GFR Calc     < >  --    GFRESTBLACK >90  --  >90 >90 >90  African American GFR Calc     < >  --    POTASSIUM 4.6  --  4.9 4.6 4.5   < >  --    TSH  --  3.81  --   --   --   --   --     < > = values in this interval not displayed.        ROS:  Constitutional, HEENT, cardiovascular, pulmonary, GI, , musculoskeletal, neuro, skin, endocrine and psych systems are negative, except as otherwise noted.        OBJECTIVE:  /80 (BP Location: Left arm, Patient Position: Sitting, Cuff Size: Adult Regular)   Pulse 78   Temp 98.5  F (36.9  C) (Oral)   Resp 16   SpO2 97%     EXAM:  GENERAL APPEARANCE: healthy, alert and no distress  RESP: lungs clear to auscultation - no rales, rhonchi or wheezes  CV: regular rates and rhythm, normal S1 S2, no S3 or S4 and no murmur, click or rub -  Right arm in sling.         ASSESSMENT AND PLAN  Patient Instructions   righthumeral fracture    Here with niece caregiver today.     Call orthopedics to ask if should be doing elbow ROM which I suspect you should be.     Overall doing well.  Just saw orthopedicst today.     No decreased LOC.     Small seizure before fall.     Check seizure medications today.     Baseline t#3 dose is 4-5/day.     With this fracture increased to 6/day (two tabs three times daily).     Last filled Dec 4 9#160, has 30 left (5 day supply).     I will write prescription to last from dec 21st at six/day  to last until Jan 3rd (normal refill schedule) and send second prescription for jan 3rd to resume normal amount of 4-5 /day /#160 per month.     Follow up with me in six months then is fine.     2.  Subdural hemnorrhage:  Very small, stable on serial imaging, will monitor for decrease level of consciousness.    Extensive discussion about flu shot/declines.         ASSESSMENT AND PLAN  1. Closed displaced comminuted supracondylar fracture of right humerus without intercondylar fracture, initial encounter    - acetaminophen-codeine (TYLENOL WITH CODEINE #3) 300-30 MG per tablet; Take 2 tablets by mouth 3 times daily for 13 days  Dispense: 78 tablet; Refill: 0    2. Seizure disorder (H)    - Keppra (Levetiracetam) Level  - Phenytoin level    3. Chronic pain syndrome    - acetaminophen-codeine (TYLENOL #3) 300-30 MG tablet; Take 2 tablets by mouth 2 times daily And 1-2 tab at noon, #160/month  Dispense: 160 tablet; Refill: 5    4. Medication management  - check levels. Recommended follow up with our neurologist dr. Segura.     - Keppra (Levetiracetam) Level  - Phenytoin level    5. Subdural hemorrhage (H)          Joel Wegener, MD

## 2019-12-17 NOTE — LETTER
December 24, 2019      Ban Petersen  [address]  [facility] [address]        Dear ,    We are writing to inform you of your test results.    The keppra and phenytoin levels were in non-toxic ranges.    Resulted Orders   Keppra (Levetiracetam) Level   Result Value Ref Range    Keppra (Levetiracetam) Level <2 (L) 12 - 46 ug/mL      Comment:      (Note)  INTERPRETIVE INFORMATION: Keppra (Levetiracetam)  Therapeutic Range:  12-46 ug/mL             Toxic:  Not well Established  Pharmacokinetics of levetiracetam are affected by renal   function. Adverse effects may include somnolence, weakness,   headache and vomiting.  This levetiracetam (Keppra) immunoassay uses the  reagents, which has known cross-reactivity with   the drug brivaracetam (Briviact) and may report inaccurate   results. Patients transitioning from levetiracetam to   brivaracetam or those who are using both medications should   not monitor drug concentrations with the [employer]   assay. These patients should be monitored using a validated   chromatographic methodology that distinguishes between   drugs to determine drug concentrations.  Performed by [facility],  [address] [place] [id number] [phone]  www.[email or web address], Ernesto Mehta MD, Lab. Director     Phenytoin level   Result Value Ref Range    Phenytoin Level 4.3 (L) 10 - 20 mg/L     If you have any questions or concerns, please call the clinic at the number listed above.   Sincerely,  Joel Daniel Wegener, MD/nr

## 2019-12-17 NOTE — PATIENT INSTRUCTIONS
righthumeral fracture    Here with niece caregiver today.     Call orthopedics to ask if should be doing elbow ROM which I suspect you should be.     Overall doing well.  Just saw orthopedicst today.     No decreased LOC.     Small seizure before fall.     Check seizure medications today.     Baseline t#3 dose is 4-5/day.     With this fracture increased to 6/day (two tabs three times daily).     Last filled Dec 4 9#160, has 30 left (5 day supply).     I will write prescription to last from dec 21st at six/day to last until Jan 3rd (normal refill schedule) and send second prescription for jan 3rd to resume normal amount of 4-5 /day /#160 per month.     Follow up with me in six months then is fine.     2.  Subdural hemnorrhage:  Very small, stable on serial imaging, will monitor for decrease level of consciousness.    Extensive discussion about flu shot/declines.

## 2019-12-19 LAB — LEVETIRACETAM SERPL-MCNC: <2 UG/ML (ref 12–46)

## 2019-12-31 ENCOUNTER — MEDICAL CORRESPONDENCE (OUTPATIENT)
Dept: HEALTH INFORMATION MANAGEMENT | Facility: CLINIC | Age: 74
End: 2019-12-31

## 2020-01-15 ENCOUNTER — TELEPHONE (OUTPATIENT)
Dept: FAMILY MEDICINE | Facility: CLINIC | Age: 75
End: 2020-01-15

## 2020-02-03 DIAGNOSIS — G89.4 CHRONIC PAIN SYNDROME: ICD-10-CM

## 2020-02-03 NOTE — TELEPHONE ENCOUNTER
Yes ok to refill.  I sent it.  Please let them know.     Follow up  In may/june as planned.     Joel Wegener,MD

## 2020-02-03 NOTE — TELEPHONE ENCOUNTER
Please investigate. If she is due for the refill and is completely out of medication, could send refill today. Otherwise, it would be best to hold this for Dr. Wegener who is in tomorrow. Winsome Kincaid M.D.

## 2020-02-03 NOTE — TELEPHONE ENCOUNTER
Patient's niece Sandra states that she called in a refill a day late so pharmacy is requesting for a new script. Please advise, thank you!    Ashely CROW     [facility]

## 2020-02-06 ENCOUNTER — PATIENT OUTREACH (OUTPATIENT)
Dept: GERIATRIC MEDICINE | Facility: CLINIC | Age: 75
End: 2020-02-06

## 2020-02-06 NOTE — PROGRESS NOTES
[facility] Care Coordination Contact    CC received notification of discharge to home. Discharge occurred on (Date03/03/20) from [facility].   CC contacted family Brother and reviewed discharge summary.  Member has a follow-up appointment with PCP in 7 days: Yes: scheduled on 03/25   Member has had a change in condition: Yes: Family says she is weaker now. Will have home PT.  Home visit needed: No  Care plan reviewed and updated.  The following home based services PCA were resumed.  New referrals placed: Yes: RN Therapies: PT  Transition log completed.   PCP notified of transition back to home via EMR.  Hillary Romano RN  [facility] Care Coordinator  [phone]      [facility] Care Coordination Contact    CC received notification of admission to [facility]  on 02/10/20, from [facility].    OBRA 1 right faxed to [facility] admissions office.   CC contacted Nursing Home LSW (Kenji [phone]) and reviewed community POC. CC requested to be notified of concerns, care conference dates and discharge planning.     Transition log updated.   PCP notified of transition to TCU via EMR.  Hillary Romano RN  [facility] Care Coordinator  [phone]      [facility] Care Coordination Contact    CC received notification of Hospital admission.  Hospital admission occurred on 02/05/20 at [facility] with Dx of bowel obstruction.  CC contacted Hospital /discharge planner and left a message with this CC contact information, reviewed community POC as well requested to be notified of concerns, care conferences and discharge planning.  Reviewed and update care plan as needed.  Notified community service providers and placed services PCA on hold as needed.  Transition log initiated.   PCP notified of hospitalization via EMR.  Hillary Romano RN  [facility] Care Coordinator  [phone]  TRANSITIONS OF CARE (GRISELDA) LOG   GRISELDA tasks should be completed by the CC within one (1)  business day of notification of each transition. Follow up contact with member is required after return to their usual care setting.  Note:  If CC finds out about the transitions fifteen (15) days or more after the member has returned to their usual care setting, no GRISELDA log is needed. However, the CC should check in with the member to discuss the transition process, any changes needed to the care plan and document it in a case note.    Member Name:  Ban Petersen [facility] Name:  [facility] Member ID#: [id number]   Product: [facility] Care Coordinator Contact:  Hillary Romano RN, MA Agency/County/Care System: [employer]   Transition Communication Actions from Care Management Contact   Transition #1   Notification Date: 02/06/20 Transition Date:   02/05/20 Transition From: Home     Is this the member s usual care setting?               yes Transition To: [place] [facility]   Transition Type:  Unplanned  Reason for Admission/Comments:  Admitted with [facility]  Hillary Romano RN  [facility] Care Coordinator  [phone]       Shared CC contact info, care plan/services with receiving setting--Date completed: 02/06/20    Notified PCP of transition--Date completed:  02/06/20     via  EMR   Transition #2   Transition #3  (if applicable)   Notification Date: 02/11/20         Transition To:  [facility]  Transition Date: 02/10/20     Transition Type:    Planned  Notified PCP -- Date completed: 02/11/20              Shared CC contact info, care plan/services with receiving setting or, if applicable, home care agency--Date completed:  02/11/20  *Complete additional tasks below, if this transition is a return to usual care setting.      Comments:   Hillary Romano RN  [facility] Care Coordinator  [phone]      Notification Date:  03/04/20        Transition To:  Home  Transition Date:   03/03/20           Transition Type:    Planned  Notified PCP--Date completed: 03/04/20          Shared CC contact info, care plan/services with receiving setting or, if applicable, home care agency--Date completed: 03/04/20      *Complete additional tasks below, if this transition is a return to usual care setting.      Comments:    Hillary Romano RN  [facility] Care Coordinator  [phone]       *Complete tasks below when the member is discharging TO their usual care setting within one (1) business day of notification.  For situations where the Care Coordinator is notified of the discharge prior to the date of discharge, the Care Coordinator must follow up with the member or designated representative to confirm that discharge actually occurred and discuss required GRISELDA tasks as outlined in the GRISELDA Instructions.  (This includes situations where it may be a  new  usual care setting for the member. (i.e., a community member who decides upon permanent nursing home placement following hospitalization and rehab).    Date completed: 03/04/20  Communicated with member or their designated representative about the following:  care transition process; about changes to the member s health status; plan of care updates; education about transitions and how to prevent unplanned transitions/readmissions  Four Pillars for Optimal Transition:    Check  Yes  - if the member, family member and/or SNF/facility staff manages the following:    If  No  provide explanation in the comments section.          [x]  Yes     []  No     Does the member have a follow-up appointment scheduled with primary care or specialist? (Mental health hospitalizations--the appt. should be w/in 7 days)   [x]  Yes     []  No     Can the member manage their medications or is there a system in place to manage medications (e.g. home care set-up)?         [x]  Yes     []  No     Can the member verbalize warning signs and symptoms to watch for and how to respond?         [x]  Yes     []  No     Does the member use a Personal Health Care Record?  Check   Yes  if visit summary, discharge summary, and/or healthcare summary are being used as a PHR.                                                                                                                                                                                    [x] Yes      [] No      Have you updated the member s care plan?  If  No  provide explanation in comments.   Comments:

## 2020-02-06 NOTE — PROGRESS NOTES
[facility] Care Coordination Contact  Dr. Wegener,    I am the [facility] care coordinator for Ban Petersen  I am writing to inform you Wen was [place] [facility] [place] for possible bowel obstruction.     All of my documentation can be found in EPIC. Please do not hesitate to contact me with any questions or concerns.  Hillary Romano RN  [facility] Care Coordinator  [phone]

## 2020-02-11 ENCOUNTER — NURSING HOME VISIT (OUTPATIENT)
Dept: GERIATRICS | Facility: CLINIC | Age: 75
End: 2020-02-11
Payer: COMMERCIAL

## 2020-02-11 VITALS
RESPIRATION RATE: 18 BRPM | HEART RATE: 89 BPM | SYSTOLIC BLOOD PRESSURE: 138 MMHG | WEIGHT: 151.5 LBS | HEIGHT: 65 IN | DIASTOLIC BLOOD PRESSURE: 89 MMHG | OXYGEN SATURATION: 93 % | BODY MASS INDEX: 25.24 KG/M2 | TEMPERATURE: 98.4 F

## 2020-02-11 DIAGNOSIS — G62.9 PERIPHERAL POLYNEUROPATHY: ICD-10-CM

## 2020-02-11 DIAGNOSIS — R47.01 APHASIA: ICD-10-CM

## 2020-02-11 DIAGNOSIS — K59.01 SLOW TRANSIT CONSTIPATION: ICD-10-CM

## 2020-02-11 DIAGNOSIS — G81.91 HEMIPARESIS OF RIGHT DOMINANT SIDE, UNSPECIFIED HEMIPARESIS ETIOLOGY (H): ICD-10-CM

## 2020-02-11 DIAGNOSIS — I10 HYPERTENSION, UNSPECIFIED TYPE: ICD-10-CM

## 2020-02-11 DIAGNOSIS — G89.4 CHRONIC PAIN SYNDROME: ICD-10-CM

## 2020-02-11 DIAGNOSIS — J69.0 ASPIRATION PNEUMONIA, UNSPECIFIED ASPIRATION PNEUMONIA TYPE, UNSPECIFIED LATERALITY, UNSPECIFIED PART OF LUNG (H): ICD-10-CM

## 2020-02-11 DIAGNOSIS — R41.89 COGNITIVE IMPAIRMENT: ICD-10-CM

## 2020-02-11 DIAGNOSIS — Z87.820 H/O TRAUMATIC BRAIN INJURY: ICD-10-CM

## 2020-02-11 DIAGNOSIS — G40.909 SEIZURE DISORDER (H): ICD-10-CM

## 2020-02-11 DIAGNOSIS — S06.9X9S TRAUMATIC BRAIN INJURY WITH LOSS OF CONSCIOUSNESS, SEQUELA (H): ICD-10-CM

## 2020-02-11 DIAGNOSIS — R53.81 DEBILITY: Primary | ICD-10-CM

## 2020-02-11 DIAGNOSIS — K56.609 SBO (SMALL BOWEL OBSTRUCTION) (H): ICD-10-CM

## 2020-02-11 DIAGNOSIS — R13.10 DYSPHAGIA, UNSPECIFIED TYPE: ICD-10-CM

## 2020-02-11 PROBLEM — R09.02 HYPOXIA: Status: ACTIVE | Noted: 2020-02-05
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
## 2020-02-11 PROBLEM — S42.211A FRACTURE OF HUMERUS NECK, RIGHT, CLOSED, INITIAL ENCOUNTER: Status: ACTIVE | Noted: 2018-05-30
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
## 2020-02-11 PROBLEM — S42.411A CLOSED SUPRACONDYLAR FRACTURE OF RIGHT HUMERUS, INITIAL ENCOUNTER: Status: ACTIVE | Noted: 2019-11-12

## 2020-02-11 PROBLEM — Z96.1 PSEUDOPHAKIA OF RIGHT EYE: Status: ACTIVE | Noted: 2017-08-29

## 2020-02-11 PROCEDURE — 99207 ZZC CDG-MDM COMPONENT: MEETS LOW - DOWN CODED: CPT | Performed by: NURSE PRACTITIONER

## 2020-02-11 PROCEDURE — 99309 SBSQ NF CARE MODERATE MDM 30: CPT | Performed by: NURSE PRACTITIONER

## 2020-02-11 RX ORDER — CALCIUM CARBONATE 500 MG/1
1 TABLET, CHEWABLE ORAL PRN
COMMUNITY
End: 2020-03-02

## 2020-02-11 RX ORDER — ACETAMINOPHEN AND CODEINE PHOSPHATE 300; 30 MG/1; MG/1
1 TABLET ORAL DAILY PRN
COMMUNITY
End: 2020-02-11

## 2020-02-11 RX ORDER — ACETAMINOPHEN AND CODEINE PHOSPHATE 300; 30 MG/1; MG/1
1 TABLET ORAL EVERY 6 HOURS PRN
Qty: 30 TABLET | Refills: 0 | Status: SHIPPED | OUTPATIENT
Start: 2020-02-11 | End: 2020-02-11

## 2020-02-11 RX ORDER — ACETAMINOPHEN AND CODEINE PHOSPHATE 300; 30 MG/1; MG/1
2 TABLET ORAL EVERY MORNING
COMMUNITY
End: 2020-02-11 | Stop reason: DRUGHIGH

## 2020-02-11 RX ORDER — ACETAMINOPHEN 500 MG
1000 TABLET ORAL 3 TIMES DAILY
Qty: 240 TABLET | Refills: 1 | Status: SHIPPED | OUTPATIENT
Start: 2020-02-11 | End: 2020-03-02

## 2020-02-11 RX ORDER — GABAPENTIN 250 MG/5ML
300 SOLUTION ORAL 3 TIMES DAILY
Qty: 470 ML | Refills: 1 | Status: SHIPPED | OUTPATIENT
Start: 2020-02-11 | End: 2020-03-02

## 2020-02-11 RX ORDER — PHENYTOIN SODIUM 100 MG/1
100 CAPSULE, EXTENDED RELEASE ORAL EVERY 12 HOURS
COMMUNITY
End: 2020-02-14

## 2020-02-11 RX ORDER — GABAPENTIN 250 MG/5ML
SOLUTION ORAL 2 TIMES DAILY
COMMUNITY
End: 2020-02-11

## 2020-02-11 ASSESSMENT — MIFFLIN-ST. JEOR: SCORE: 1188.08
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
## 2020-02-12 ENCOUNTER — NURSING HOME VISIT (OUTPATIENT)
Dept: GERIATRICS | Facility: CLINIC | Age: 75
End: 2020-02-12
Payer: COMMERCIAL

## 2020-02-12 VITALS
HEIGHT: 65 IN | DIASTOLIC BLOOD PRESSURE: 79 MMHG | HEART RATE: 90 BPM | TEMPERATURE: 97.9 F | SYSTOLIC BLOOD PRESSURE: 134 MMHG | BODY MASS INDEX: 25.24 KG/M2 | WEIGHT: 151.5 LBS | OXYGEN SATURATION: 93 % | RESPIRATION RATE: 18 BRPM

## 2020-02-12 DIAGNOSIS — R53.81 PHYSICAL DECONDITIONING: ICD-10-CM

## 2020-02-12 DIAGNOSIS — S06.9X9S TRAUMATIC BRAIN INJURY WITH LOSS OF CONSCIOUSNESS, SEQUELA (H): ICD-10-CM

## 2020-02-12 DIAGNOSIS — K56.609 SBO (SMALL BOWEL OBSTRUCTION) (H): Primary | ICD-10-CM

## 2020-02-12 DIAGNOSIS — G62.9 PERIPHERAL POLYNEUROPATHY: ICD-10-CM

## 2020-02-12 DIAGNOSIS — R41.89 COGNITIVE IMPAIRMENT: ICD-10-CM

## 2020-02-12 DIAGNOSIS — R13.10 DYSPHAGIA, UNSPECIFIED TYPE: ICD-10-CM

## 2020-02-12 DIAGNOSIS — J69.0 ASPIRATION PNEUMONITIS (H): ICD-10-CM

## 2020-02-12 DIAGNOSIS — G40.909 SEIZURE DISORDER (H): ICD-10-CM

## 2020-02-12 PROCEDURE — 99305 1ST NF CARE MODERATE MDM 35: CPT | Mod: AI | Performed by: INTERNAL MEDICINE

## 2020-02-12 ASSESSMENT — MIFFLIN-ST. JEOR: SCORE: 1188.08
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
## 2020-02-13 ENCOUNTER — RECORDS - HEALTHEAST (OUTPATIENT)
Dept: LAB | Facility: CLINIC | Age: 75
End: 2020-02-13

## 2020-02-14 ENCOUNTER — TRANSFERRED RECORDS (OUTPATIENT)
Dept: HEALTH INFORMATION MANAGEMENT | Facility: CLINIC | Age: 75
End: 2020-02-14

## 2020-02-14 DIAGNOSIS — E55.9 VITAMIN D DEFICIENCY: Primary | ICD-10-CM

## 2020-02-14 DIAGNOSIS — G40.909 SEIZURE DISORDER (H): Primary | ICD-10-CM

## 2020-02-14 LAB
25(OH)D3 SERPL-MCNC: 20.1 NG/ML (ref 30–80)
ALBUMIN SERPL-MCNC: 3.1 G/DL (ref 3.5–5)
ALBUMIN SERPL-MCNC: 3.1 G/DL (ref 3.5–5)
ALP SERPL-CCNC: 66 U/L (ref 45–120)
ALP SERPL-CCNC: 66 U/L (ref 45–120)
ALT SERPL W P-5'-P-CCNC: 16 U/L (ref 0–45)
ALT SERPL-CCNC: 16 U/L (ref 0–45)
ANION GAP SERPL CALCULATED.3IONS-SCNC: 11 MMOL/L (ref 5–18)
ANION GAP SERPL CALCULATED.3IONS-SCNC: 11 MMOL/L (ref 5–18)
AST SERPL W P-5'-P-CCNC: 12 U/L (ref 0–40)
AST SERPL-CCNC: 12 U/L (ref 0–40)
BASOPHILS # BLD AUTO: 0 THOU/UL (ref 0–0.2)
BASOPHILS NFR BLD AUTO: 1 % (ref 0–2)
BILIRUB SERPL-MCNC: 0.2 MG/DL (ref 0–1)
BILIRUB SERPL-MCNC: 0.2 MG/DL (ref 0–1)
BUN SERPL-MCNC: 9 MG/DL (ref 8–28)
BUN SERPL-MCNC: 9 MG/DL (ref 8–28)
CALCIUM SERPL-MCNC: 8.9 MG/DL (ref 8.5–10.5)
CALCIUM SERPL-MCNC: 8.9 MG/DL (ref 8.5–10.5)
CHLORIDE BLD-SCNC: 107 MMOL/L (ref 98–107)
CHLORIDE SERPLBLD-SCNC: 107 MMOL/L (ref 98–107)
CO2 SERPL-SCNC: 23 MMOL/L (ref 22–31)
CO2 SERPL-SCNC: 23 MMOL/L (ref 22–31)
CREAT SERPL-MCNC: 0.52 MG/DL (ref 0.6–1.1)
CREAT SERPL-MCNC: 0.52 MG/DL (ref 0.6–1.1)
DIFFERENTIAL: ABNORMAL
EOSINOPHIL # BLD AUTO: 0.1 THOU/UL (ref 0–0.4)
EOSINOPHIL NFR BLD AUTO: 2 % (ref 0–6)
ERYTHROCYTE [DISTWIDTH] IN BLOOD BY AUTOMATED COUNT: 11.9 % (ref 11–14.5)
ERYTHROCYTE [DISTWIDTH] IN BLOOD BY AUTOMATED COUNT: 11.9 % (ref 11–14.5)
GFR SERPL CREATININE-BSD FRML MDRD: >60 ML/MIN/1.73M2
GFR SERPL CREATININE-BSD FRML MDRD: >60 ML/MIN/1.73M2
GLUCOSE BLD-MCNC: 87 MG/DL (ref 70–125)
GLUCOSE SERPL-MCNC: 87 MG/DL (ref 70–125)
HCT VFR BLD AUTO: 38.2 % (ref 35–47)
HCT VFR BLD AUTO: 38.2 % (ref 35–47)
HEMOGLOBIN: 12.5 G/DL (ref 12–16)
HGB BLD-MCNC: 12.5 G/DL (ref 12–16)
LYMPHOCYTES # BLD AUTO: 1.2 THOU/UL (ref 0.8–4.4)
LYMPHOCYTES NFR BLD AUTO: 21 % (ref 20–40)
MCH RBC QN AUTO: 33.6 PG (ref 27–34)
MCH RBC QN AUTO: 33.6 PG (ref 27–34)
MCHC RBC AUTO-ENTMCNC: 32.7 G/DL (ref 32–36)
MCHC RBC AUTO-ENTMCNC: 32.7 G/DL (ref 32–36)
MCV RBC AUTO: 103 FL (ref 80–100)
MCV RBC AUTO: 103 FL (ref 80–100)
MONOCYTES # BLD AUTO: 0.4 THOU/UL (ref 0–0.9)
MONOCYTES NFR BLD AUTO: 7 % (ref 2–10)
NEUTROPHILS # BLD AUTO: 4.1 THOU/UL (ref 2–7.7)
NEUTROPHILS NFR BLD AUTO: 70 % (ref 50–70)
PHENYTOIN (DILATIN) FREE: <0.5 UG/ML (ref 1–2)
PHENYTOIN SERPL-MCNC: 3 UG/ML (ref 10–20)
PLATELET # BLD AUTO: 330 THOU/UL (ref 140–440)
PLATELET # BLD AUTO: 330 THOU/UL (ref 140–440)
PMV BLD AUTO: 9.2 FL (ref 8.5–12.5)
POTASSIUM BLD-SCNC: 4.3 MMOL/L (ref 3.5–5)
POTASSIUM SERPL-SCNC: 4.3 MMOL/L (ref 3.5–5)
PROT SERPL-MCNC: 5.8 G/DL (ref 6–8)
PROT SERPL-MCNC: 5.8 G/DL (ref 6–8)
RBC # BLD AUTO: 3.72 MILL/UL (ref 3.8–5.4)
RBC # BLD AUTO: 3.72 MILL/UL (ref 3.8–5.4)
SODIUM SERPL-SCNC: 141 MMOL/L (ref 136–145)
SODIUM SERPL-SCNC: 141 MMOL/L (ref 136–145)
TSH SERPL DL<=0.005 MIU/L-ACNC: 2.49 UIU/ML (ref 0.3–5)
TSH SERPL-ACNC: 2.49 UIU/ML (ref 0.3–5)
VIT B12 SERPL-MCNC: 612 PG/ML (ref 213–816)
WBC # BLD AUTO: 5.9 THOU/UL (ref 4–11)
WBC: 5.9 THOU/UL (ref 4–11)

## 2020-02-14 RX ORDER — PHENYTOIN SODIUM 100 MG/1
CAPSULE, EXTENDED RELEASE ORAL
Qty: 90 CAPSULE | Refills: 1 | Status: SHIPPED | OUTPATIENT
Start: 2020-02-14 | End: 2020-03-02

## 2020-02-14 RX ORDER — CHOLECALCIFEROL (VITAMIN D3) 50 MCG
1 TABLET ORAL DAILY
Qty: 30 TABLET | Refills: 3 | Status: SHIPPED | OUTPATIENT
Start: 2020-02-14 | End: 2020-03-02

## 2020-02-14 NOTE — TELEPHONE ENCOUNTER
Comment: Lab values back with creatinine 0.52, albumin mildly low at 3.1, hgb 12.5, vitamin B12 WNL, TSH WNL, however phenytoin level is low at 3. Currently on dilantin 100mg BID  Plan:  -Will increase dilantin dose to 100mg in AM and 200mg at HS.   -Recommend recheck Dilantin level in 2-3 weeks.

## 2020-02-17 ENCOUNTER — NURSING HOME VISIT (OUTPATIENT)
Dept: GERIATRICS | Facility: CLINIC | Age: 75
End: 2020-02-17
Payer: COMMERCIAL

## 2020-02-17 VITALS
HEIGHT: 65 IN | WEIGHT: 138 LBS | BODY MASS INDEX: 22.99 KG/M2 | HEART RATE: 85 BPM | SYSTOLIC BLOOD PRESSURE: 113 MMHG | TEMPERATURE: 97.2 F | OXYGEN SATURATION: 93 % | RESPIRATION RATE: 16 BRPM | DIASTOLIC BLOOD PRESSURE: 68 MMHG

## 2020-02-17 DIAGNOSIS — G89.4 CHRONIC PAIN SYNDROME: ICD-10-CM

## 2020-02-17 DIAGNOSIS — S06.9X9S TRAUMATIC BRAIN INJURY WITH LOSS OF CONSCIOUSNESS, SEQUELA (H): ICD-10-CM

## 2020-02-17 DIAGNOSIS — G62.9 PERIPHERAL POLYNEUROPATHY: ICD-10-CM

## 2020-02-17 DIAGNOSIS — R53.81 DEBILITY: ICD-10-CM

## 2020-02-17 DIAGNOSIS — J69.0 ASPIRATION PNEUMONITIS (H): ICD-10-CM

## 2020-02-17 DIAGNOSIS — K59.01 SLOW TRANSIT CONSTIPATION: ICD-10-CM

## 2020-02-17 DIAGNOSIS — E55.9 VITAMIN D DEFICIENCY: ICD-10-CM

## 2020-02-17 DIAGNOSIS — R47.01 APHASIA: ICD-10-CM

## 2020-02-17 DIAGNOSIS — I10 HYPERTENSION, UNSPECIFIED TYPE: ICD-10-CM

## 2020-02-17 DIAGNOSIS — Z87.820 H/O TRAUMATIC BRAIN INJURY: ICD-10-CM

## 2020-02-17 DIAGNOSIS — R13.10 DYSPHAGIA, UNSPECIFIED TYPE: ICD-10-CM

## 2020-02-17 DIAGNOSIS — K56.609 SBO (SMALL BOWEL OBSTRUCTION) (H): ICD-10-CM

## 2020-02-17 DIAGNOSIS — G81.91 HEMIPARESIS OF RIGHT DOMINANT SIDE, UNSPECIFIED HEMIPARESIS ETIOLOGY (H): ICD-10-CM

## 2020-02-17 DIAGNOSIS — G40.909 SEIZURE DISORDER (H): Primary | ICD-10-CM

## 2020-02-17 DIAGNOSIS — R41.89 COGNITIVE IMPAIRMENT: ICD-10-CM

## 2020-02-17 PROCEDURE — 99309 SBSQ NF CARE MODERATE MDM 30: CPT | Performed by: NURSE PRACTITIONER

## 2020-02-17 RX ORDER — POLYETHYLENE GLYCOL 3350 17 G/17G
17 POWDER, FOR SOLUTION ORAL DAILY
Qty: 30 PACKET | Refills: 3 | Status: SHIPPED | OUTPATIENT
Start: 2020-02-17 | End: 2020-03-02

## 2020-02-17 ASSESSMENT — MIFFLIN-ST. JEOR: SCORE: 1126.84
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
## 2020-02-21 ENCOUNTER — RECORDS - HEALTHEAST (OUTPATIENT)
Dept: LAB | Facility: CLINIC | Age: 75
End: 2020-02-21

## 2020-02-24 ENCOUNTER — RECORDS - HEALTHEAST (OUTPATIENT)
Dept: LAB | Facility: CLINIC | Age: 75
End: 2020-02-24

## 2020-02-24 ENCOUNTER — NURSING HOME VISIT (OUTPATIENT)
Dept: GERIATRICS | Facility: CLINIC | Age: 75
End: 2020-02-24
Payer: COMMERCIAL

## 2020-02-24 ENCOUNTER — TRANSFERRED RECORDS (OUTPATIENT)
Dept: HEALTH INFORMATION MANAGEMENT | Facility: CLINIC | Age: 75
End: 2020-02-24

## 2020-02-24 VITALS
HEIGHT: 65 IN | BODY MASS INDEX: 23.09 KG/M2 | HEART RATE: 95 BPM | OXYGEN SATURATION: 95 % | SYSTOLIC BLOOD PRESSURE: 109 MMHG | DIASTOLIC BLOOD PRESSURE: 71 MMHG | RESPIRATION RATE: 16 BRPM | TEMPERATURE: 97.3 F | WEIGHT: 138.6 LBS

## 2020-02-24 DIAGNOSIS — R47.01 APHASIA: ICD-10-CM

## 2020-02-24 DIAGNOSIS — I10 HYPERTENSION, UNSPECIFIED TYPE: ICD-10-CM

## 2020-02-24 DIAGNOSIS — R13.10 DYSPHAGIA, UNSPECIFIED TYPE: ICD-10-CM

## 2020-02-24 DIAGNOSIS — K56.609 SBO (SMALL BOWEL OBSTRUCTION) (H): ICD-10-CM

## 2020-02-24 DIAGNOSIS — J69.0 ASPIRATION PNEUMONITIS (H): ICD-10-CM

## 2020-02-24 DIAGNOSIS — D70.9 NEUTROPENIC FEVER (H): ICD-10-CM

## 2020-02-24 DIAGNOSIS — K59.01 SLOW TRANSIT CONSTIPATION: ICD-10-CM

## 2020-02-24 DIAGNOSIS — Z87.820 H/O TRAUMATIC BRAIN INJURY: ICD-10-CM

## 2020-02-24 DIAGNOSIS — S06.9X9S TRAUMATIC BRAIN INJURY WITH LOSS OF CONSCIOUSNESS, SEQUELA (H): ICD-10-CM

## 2020-02-24 DIAGNOSIS — G62.9 PERIPHERAL POLYNEUROPATHY: ICD-10-CM

## 2020-02-24 DIAGNOSIS — E55.9 VITAMIN D DEFICIENCY: ICD-10-CM

## 2020-02-24 DIAGNOSIS — R53.81 DEBILITY: ICD-10-CM

## 2020-02-24 DIAGNOSIS — R50.81 NEUTROPENIC FEVER (H): ICD-10-CM

## 2020-02-24 DIAGNOSIS — G81.91 HEMIPARESIS OF RIGHT DOMINANT SIDE, UNSPECIFIED HEMIPARESIS ETIOLOGY (H): ICD-10-CM

## 2020-02-24 DIAGNOSIS — D72.829 LEUKOCYTOSIS, UNSPECIFIED TYPE: ICD-10-CM

## 2020-02-24 DIAGNOSIS — R41.89 COGNITIVE IMPAIRMENT: ICD-10-CM

## 2020-02-24 DIAGNOSIS — G89.4 CHRONIC PAIN SYNDROME: ICD-10-CM

## 2020-02-24 DIAGNOSIS — G40.909 SEIZURE DISORDER (H): Primary | ICD-10-CM

## 2020-02-24 LAB
ALBUMIN UR-MCNC: ABNORMAL MG/DL
AMORPH CRY #/AREA URNS HPF: ABNORMAL /[HPF]
ANION GAP SERPL CALCULATED.3IONS-SCNC: 9 MMOL/L (ref 5–18)
ANION GAP SERPL CALCULATED.3IONS-SCNC: 9 MMOL/L (ref 5–18)
APPEARANCE UR: ABNORMAL
BACTERIA #/AREA URNS HPF: ABNORMAL HPF
BASOPHILS # BLD AUTO: 0 THOU/UL (ref 0–0.2)
BASOPHILS NFR BLD AUTO: 0 % (ref 0–2)
BILIRUB UR QL STRIP: NEGATIVE
BUN SERPL-MCNC: 10 MG/DL (ref 8–28)
BUN SERPL-MCNC: 10 MG/DL (ref 8–28)
CALCIUM SERPL-MCNC: 9.5 MG/DL (ref 8.5–10.5)
CALCIUM SERPL-MCNC: 9.5 MG/DL (ref 8.5–10.5)
CHLORIDE BLD-SCNC: 106 MMOL/L (ref 98–107)
CHLORIDE SERPLBLD-SCNC: 106 MMOL/L (ref 98–107)
CO2 SERPL-SCNC: 24 MMOL/L (ref 22–31)
CO2 SERPL-SCNC: 24 MMOL/L (ref 22–31)
COLOR UR AUTO: YELLOW
CREAT SERPL-MCNC: 0.63 MG/DL (ref 0.6–1.1)
CREAT SERPL-MCNC: 0.63 MG/DL (ref 0.6–1.1)
DIFFERENTIAL: ABNORMAL
EOSINOPHIL # BLD AUTO: 0 THOU/UL (ref 0–0.4)
EOSINOPHIL NFR BLD AUTO: 0 % (ref 0–6)
ERYTHROCYTE [DISTWIDTH] IN BLOOD BY AUTOMATED COUNT: 12.1 % (ref 11–14.5)
ERYTHROCYTE [DISTWIDTH] IN BLOOD BY AUTOMATED COUNT: 12.1 % (ref 11–14.5)
FLUAV AG SPEC QL IA: NORMAL
FLUBV AG SPEC QL IA: NORMAL
GFR SERPL CREATININE-BSD FRML MDRD: >60 ML/MIN/1.73M2
GFR SERPL CREATININE-BSD FRML MDRD: >60 ML/MIN/1.73M2
GLUCOSE BLD-MCNC: 116 MG/DL (ref 70–125)
GLUCOSE SERPL-MCNC: 116 MG/DL (ref 70–125)
GLUCOSE UR STRIP-MCNC: NEGATIVE MG/DL
HCT VFR BLD AUTO: 39.9 % (ref 35–47)
HCT VFR BLD AUTO: 39.9 % (ref 35–47)
HEMOGLOBIN: 12.8 G/DL (ref 12–16)
HGB BLD-MCNC: 12.8 G/DL (ref 12–16)
HGB UR QL STRIP: ABNORMAL
KETONES UR STRIP-MCNC: NEGATIVE MG/DL
LEUKOCYTE ESTERASE UR QL STRIP: ABNORMAL
LYMPHOCYTES # BLD AUTO: 0.8 THOU/UL (ref 0.8–4.4)
LYMPHOCYTES NFR BLD AUTO: 6 % (ref 20–40)
MCH RBC QN AUTO: 33.3 PG (ref 27–34)
MCH RBC QN AUTO: 33.3 PG (ref 27–34)
MCHC RBC AUTO-ENTMCNC: 32.1 G/DL (ref 32–36)
MCHC RBC AUTO-ENTMCNC: 32.1 G/DL (ref 32–36)
MCV RBC AUTO: 104 FL (ref 80–100)
MCV RBC AUTO: 104 FL (ref 80–100)
MONOCYTES # BLD AUTO: 1.3 THOU/UL (ref 0–0.9)
MONOCYTES NFR BLD AUTO: 9 % (ref 2–10)
MUCOUS THREADS #/AREA URNS LPF: ABNORMAL LPF
NEUTROPHILS # BLD AUTO: 11.2 THOU/UL (ref 2–7.7)
NEUTROPHILS NFR BLD AUTO: 84 % (ref 50–70)
NITRATE UR QL: NEGATIVE
PH UR STRIP: 5.5 [PH] (ref 4.5–8)
PHENYTOIN SERPL-MCNC: 14.9 UG/ML (ref 10–20)
PLATELET # BLD AUTO: 246 THOU/UL (ref 140–440)
PLATELET # BLD AUTO: 246 THOU/UL (ref 140–440)
PMV BLD AUTO: 9.3 FL (ref 8.5–12.5)
POTASSIUM BLD-SCNC: 4.2 MMOL/L (ref 3.5–5)
POTASSIUM SERPL-SCNC: 4.2 MMOL/L (ref 3.5–5)
RBC # BLD AUTO: 3.84 MILL/UL (ref 3.8–5.4)
RBC # BLD AUTO: 3.84 MILL/UL (ref 3.8–5.4)
RBC #/AREA URNS AUTO: >100 HPF
SODIUM SERPL-SCNC: 139 MMOL/L (ref 136–145)
SODIUM SERPL-SCNC: 139 MMOL/L (ref 136–145)
SP GR UR STRIP: 1.04 (ref 1–1.03)
SQUAMOUS #/AREA URNS AUTO: ABNORMAL LPF
UROBILINOGEN UR STRIP-ACNC: ABNORMAL
WBC # BLD AUTO: 13.4 THOU/UL (ref 4–11)
WBC #/AREA URNS AUTO: ABNORMAL HPF
WBC: 13.4 THOU/UL (ref 4–11)

## 2020-02-24 PROCEDURE — 99309 SBSQ NF CARE MODERATE MDM 30: CPT | Performed by: NURSE PRACTITIONER

## 2020-02-24 RX ORDER — NITROFURANTOIN MACROCRYSTAL 100 MG
100 CAPSULE ORAL 2 TIMES DAILY
Qty: 20 CAPSULE | Refills: 0 | Status: SHIPPED | OUTPATIENT
Start: 2020-02-24 | End: 2020-03-02

## 2020-02-24 ASSESSMENT — MIFFLIN-ST. JEOR: SCORE: 1129.57
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
## 2020-02-25 LAB — BACTERIA SPEC CULT: ABNORMAL

## 2020-02-27 ENCOUNTER — RECORDS - HEALTHEAST (OUTPATIENT)
Dept: LAB | Facility: CLINIC | Age: 75
End: 2020-02-27

## 2020-02-28 ENCOUNTER — TRANSFERRED RECORDS (OUTPATIENT)
Dept: HEALTH INFORMATION MANAGEMENT | Facility: CLINIC | Age: 75
End: 2020-02-28

## 2020-02-28 LAB
ANION GAP SERPL CALCULATED.3IONS-SCNC: 9 MMOL/L (ref 5–18)
ANION GAP SERPL CALCULATED.3IONS-SCNC: 9 MMOL/L (ref 5–18)
BASOPHILS # BLD AUTO: 0 THOU/UL (ref 0–0.2)
BASOPHILS NFR BLD AUTO: 0 % (ref 0–2)
BUN SERPL-MCNC: 9 MG/DL (ref 8–28)
BUN SERPL-MCNC: 9 MG/DL (ref 8–28)
CALCIUM SERPL-MCNC: 9.5 MG/DL (ref 8.5–10.5)
CALCIUM SERPL-MCNC: 9.5 MG/DL (ref 8.5–10.5)
CHLORIDE BLD-SCNC: 107 MMOL/L (ref 98–107)
CHLORIDE SERPLBLD-SCNC: 107 MMOL/L (ref 98–107)
CO2 SERPL-SCNC: 26 MMOL/L (ref 22–31)
CO2 SERPL-SCNC: 26 MMOL/L (ref 22–31)
CREAT SERPL-MCNC: 0.56 MG/DL (ref 0.6–1.1)
CREAT SERPL-MCNC: 0.56 MG/DL (ref 0.6–1.11)
DIFFERENTIAL: ABNORMAL
EOSINOPHIL # BLD AUTO: 0.1 THOU/UL (ref 0–0.4)
EOSINOPHIL NFR BLD AUTO: 1 % (ref 0–6)
ERYTHROCYTE [DISTWIDTH] IN BLOOD BY AUTOMATED COUNT: 11.9 % (ref 11–14.5)
ERYTHROCYTE [DISTWIDTH] IN BLOOD BY AUTOMATED COUNT: 11.9 % (ref 11–14.5)
GFR SERPL CREATININE-BSD FRML MDRD: >60 ML/MIN/1.73M2
GFR SERPL CREATININE-BSD FRML MDRD: >60 ML/MIN/1.73M2
GLUCOSE BLD-MCNC: 99 MG/DL (ref 70–125)
GLUCOSE SERPL-MCNC: 99 MG/DL (ref 70–125)
HCT VFR BLD AUTO: 36.2 % (ref 35–47)
HCT VFR BLD AUTO: 36.2 % (ref 35–47)
HEMOGLOBIN: 11.6 G/DL (ref 12–16)
HGB BLD-MCNC: 11.6 G/DL (ref 12–16)
LYMPHOCYTES # BLD AUTO: 0.8 THOU/UL (ref 0.8–4.4)
LYMPHOCYTES NFR BLD AUTO: 15 % (ref 20–40)
MCH RBC QN AUTO: 32.8 PG (ref 27–34)
MCH RBC QN AUTO: 32.8 PG (ref 27–34)
MCHC RBC AUTO-ENTMCNC: 32 G/DL (ref 32–36)
MCHC RBC AUTO-ENTMCNC: 32 G/DL (ref 32–36)
MCV RBC AUTO: 102 FL (ref 80–100)
MCV RBC AUTO: 102 FL (ref 80–100)
MONOCYTES # BLD AUTO: 0.4 THOU/UL (ref 0–0.9)
MONOCYTES NFR BLD AUTO: 8 % (ref 2–10)
NEUTROPHILS # BLD AUTO: 4.2 THOU/UL (ref 2–7.7)
NEUTROPHILS NFR BLD AUTO: 76 % (ref 50–70)
PLATELET # BLD AUTO: 265 THOU/UL (ref 140–440)
PLATELET # BLD AUTO: 265 THOU/UL (ref 140–440)
PMV BLD AUTO: 9.8 FL (ref 8.5–12.5)
POTASSIUM BLD-SCNC: 4.2 MMOL/L (ref 3.5–5)
POTASSIUM SERPL-SCNC: 4.2 MMOL/L (ref 3.5–5)
RBC # BLD AUTO: 3.54 MILL/UL (ref 3.8–5.4)
RBC # BLD AUTO: 3.54 MILL/UL (ref 3.8–5.4)
SODIUM SERPL-SCNC: 142 MMOL/L (ref 136–145)
SODIUM SERPL-SCNC: 142 MMOL/L (ref 136–145)
WBC # BLD AUTO: 5.6 THOU/UL (ref 4–11)
WBC: 5.6 THOU/UL (ref 4–11)

## 2020-03-02 ENCOUNTER — DISCHARGE SUMMARY NURSING HOME (OUTPATIENT)
Dept: GERIATRICS | Facility: CLINIC | Age: 75
End: 2020-03-02
Payer: COMMERCIAL

## 2020-03-02 VITALS
DIASTOLIC BLOOD PRESSURE: 95 MMHG | HEART RATE: 94 BPM | HEIGHT: 65 IN | TEMPERATURE: 97.8 F | BODY MASS INDEX: 23.09 KG/M2 | WEIGHT: 138.6 LBS | SYSTOLIC BLOOD PRESSURE: 152 MMHG | OXYGEN SATURATION: 96 % | RESPIRATION RATE: 16 BRPM

## 2020-03-02 DIAGNOSIS — G89.4 CHRONIC PAIN SYNDROME: ICD-10-CM

## 2020-03-02 DIAGNOSIS — D72.829 LEUKOCYTOSIS, UNSPECIFIED TYPE: ICD-10-CM

## 2020-03-02 DIAGNOSIS — R41.89 COGNITIVE IMPAIRMENT: ICD-10-CM

## 2020-03-02 DIAGNOSIS — R50.81 NEUTROPENIC FEVER (H): ICD-10-CM

## 2020-03-02 DIAGNOSIS — R47.01 APHASIA: ICD-10-CM

## 2020-03-02 DIAGNOSIS — N39.0 URINARY TRACT INFECTION WITHOUT HEMATURIA, SITE UNSPECIFIED: ICD-10-CM

## 2020-03-02 DIAGNOSIS — S06.9X9S TRAUMATIC BRAIN INJURY WITH LOSS OF CONSCIOUSNESS, SEQUELA (H): ICD-10-CM

## 2020-03-02 DIAGNOSIS — E55.9 VITAMIN D DEFICIENCY: ICD-10-CM

## 2020-03-02 DIAGNOSIS — D70.9 NEUTROPENIC FEVER (H): ICD-10-CM

## 2020-03-02 DIAGNOSIS — G81.91 HEMIPARESIS OF RIGHT DOMINANT SIDE, UNSPECIFIED HEMIPARESIS ETIOLOGY (H): ICD-10-CM

## 2020-03-02 DIAGNOSIS — R13.10 DYSPHAGIA, UNSPECIFIED TYPE: ICD-10-CM

## 2020-03-02 DIAGNOSIS — R53.81 DEBILITY: ICD-10-CM

## 2020-03-02 DIAGNOSIS — J69.0 ASPIRATION PNEUMONITIS (H): ICD-10-CM

## 2020-03-02 DIAGNOSIS — Z87.820 H/O TRAUMATIC BRAIN INJURY: ICD-10-CM

## 2020-03-02 DIAGNOSIS — G62.9 PERIPHERAL POLYNEUROPATHY: ICD-10-CM

## 2020-03-02 DIAGNOSIS — I10 HYPERTENSION, UNSPECIFIED TYPE: ICD-10-CM

## 2020-03-02 DIAGNOSIS — G40.909 SEIZURE DISORDER (H): ICD-10-CM

## 2020-03-02 DIAGNOSIS — K56.609 SBO (SMALL BOWEL OBSTRUCTION) (H): Primary | ICD-10-CM

## 2020-03-02 DIAGNOSIS — K59.01 SLOW TRANSIT CONSTIPATION: ICD-10-CM

## 2020-03-02 PROCEDURE — 99316 NF DSCHRG MGMT 30 MIN+: CPT | Performed by: NURSE PRACTITIONER

## 2020-03-02 RX ORDER — PHENYTOIN SODIUM 100 MG/1
CAPSULE, EXTENDED RELEASE ORAL
Qty: 90 CAPSULE | Refills: 0 | Status: SHIPPED | OUTPATIENT
Start: 2020-03-02 | End: 2020-03-03

## 2020-03-02 RX ORDER — POLYETHYLENE GLYCOL 3350 17 G/17G
17 POWDER, FOR SOLUTION ORAL DAILY
Qty: 30 PACKET | Refills: 0 | Status: SHIPPED | OUTPATIENT
Start: 2020-03-02

## 2020-03-02 RX ORDER — ACETAMINOPHEN 500 MG
1000 TABLET ORAL 3 TIMES DAILY
Qty: 240 TABLET | Refills: 0 | Status: SHIPPED | OUTPATIENT
Start: 2020-03-02

## 2020-03-02 RX ORDER — NITROFURANTOIN MACROCRYSTAL 100 MG
100 CAPSULE ORAL 2 TIMES DAILY
Qty: 6 CAPSULE | Refills: 0 | Status: SHIPPED | OUTPATIENT
Start: 2020-03-02 | End: 2020-03-05

## 2020-03-02 RX ORDER — CALCIUM CARBONATE 500 MG/1
1 TABLET, CHEWABLE ORAL 2 TIMES DAILY PRN
Qty: 30 TABLET | Refills: 0 | Status: SHIPPED | OUTPATIENT
Start: 2020-03-02

## 2020-03-02 RX ORDER — GABAPENTIN 250 MG/5ML
300 SOLUTION ORAL 3 TIMES DAILY
Qty: 470 ML | Refills: 0 | Status: SHIPPED | OUTPATIENT
Start: 2020-03-02 | End: 2020-03-25

## 2020-03-02 RX ORDER — CHOLECALCIFEROL (VITAMIN D3) 50 MCG
1 TABLET ORAL DAILY
Qty: 30 TABLET | Refills: 0 | Status: SHIPPED | OUTPATIENT
Start: 2020-03-02

## 2020-03-02 ASSESSMENT — MIFFLIN-ST. JEOR: SCORE: 1129.57
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
## 2020-03-04 ENCOUNTER — TELEPHONE (OUTPATIENT)
Dept: FAMILY MEDICINE | Facility: CLINIC | Age: 75
End: 2020-03-04

## 2020-03-05 ENCOUNTER — TELEPHONE (OUTPATIENT)
Dept: FAMILY MEDICINE | Facility: CLINIC | Age: 75
End: 2020-03-05

## 2020-03-05 NOTE — TELEPHONE ENCOUNTER
Sherrill called from  home care requesting orders for:    Nursing 1x wk for 4 wks & 2 prn    PT,OT & Speech eval      assessment     Sherrill also reports patient came from TCU with a UTI so asking if we would want to [facility]?  If so, they can get the urine sample before hand & bring due to the difficulty of getting from patient    Please advise

## 2020-03-05 NOTE — TELEPHONE ENCOUNTER
I can authorize home care.  Eugene advise regarding urine sample.  I would tend to err on the side of caution and have them collect sample whether it is run or not.  Oneyda Rain RN

## 2020-03-06 NOTE — TELEPHONE ENCOUNTER
ED / Discharge Outreach Protocol    Patient Contact    Attempt # 1    Was call answered?  No.  Unable to leave message.    I tried to call pt's niece, pt's caregiver.  VM is full. Not able to leave message.    Pt has hosp f/u appt on 3/25/2020.  Recall.  SERA Summers

## 2020-03-09 NOTE — TELEPHONE ENCOUNTER
ED / Discharge Outreach Protocol    Patient Contact    Attempt # 2    Was call answered?  Yes. Pt's brother, Saul, answered.  Pt is nonverbal.  Saul says Sandra- marleen is the one to talk to.  She works with the meds.    Pt came home 1 week ago. Seems a bit lethargic.  Left info about 3/25/2020 appt with PCP.  I also left a detailed message for Sandra on VM.  SERA Summers

## 2020-03-10 ENCOUNTER — DOCUMENTATION ONLY (OUTPATIENT)
Dept: CARE COORDINATION | Facility: CLINIC | Age: 75
End: 2020-03-10

## 2020-03-10 NOTE — PROGRESS NOTES
Requesting Home care orders OT 1wk3 for DME, L UE strengthening and ADLs.     Pt had a fall today with no injuries noted.     [facility] now requests orders and shares plan of care/discharge summaries for some patients through [employer].  Please REPLY TO THIS MESSAGE OR ROUTE BACK TO THE AUTHOR in order to give authorization for orders when needed.  This is considered a verbal order, you will still receive a faxed copy of orders for signature.  Thank you for your assistance in improving collaboration for our patients.    Miriam Houser OTR/L, Supervising OT

## 2020-03-11 ENCOUNTER — TELEPHONE (OUTPATIENT)
Dept: FAMILY MEDICINE | Facility: CLINIC | Age: 75
End: 2020-03-11

## 2020-03-11 NOTE — TELEPHONE ENCOUNTER
[facility] now requests orders and shares plan of care/discharge summaries for some patients through [employer].  Please REPLY TO THIS MESSAGE OR ROUTE BACK TO THE AUTHOR in order to give authorization for orders when needed.  This is considered a verbal order, you will still receive a faxed copy of orders for signature.  Thank you for your assistance in improving collaboration for our patients.    ORDER  Ms. Petersen seen for home PT [place].  Requesting to continue 1x/wk x 1 then 2x/wk x 2 for therex, mobility training, caregiver ed and falls prevention.    Thank you,  Jasmyne Hall, PT  [phone]

## 2020-03-13 ENCOUNTER — TELEPHONE (OUTPATIENT)
Dept: FAMILY MEDICINE | Facility: CLINIC | Age: 75
End: 2020-03-13

## 2020-03-13 NOTE — TELEPHONE ENCOUNTER
SERA Wilkes  From  Home Care calling with fax number [phone] for permission from Dr. Wegener so RN can draw Kami puncture.

## 2020-03-16 NOTE — TELEPHONE ENCOUNTER
Dr. Wegener-Please review and advise if order can be given.    Writer called SERA Wilkes, who stated:  1. Patient requesting Dilantin level be checked  2. Dose was changed in February 2020 to: 100 mg in AM and 200 mg at bedtime  3. Patient reports to home care RN she is feeling more weak and feels it is related to Dilantin dose change    Thank you!  BONITA OjedaN, RN

## 2020-03-18 ENCOUNTER — MEDICAL CORRESPONDENCE (OUTPATIENT)
Dept: HEALTH INFORMATION MANAGEMENT | Facility: CLINIC | Age: 75
End: 2020-03-18

## 2020-03-18 LAB — PHENYTOIN SERPL-MCNC: 9.4 MG/L (ref 10–20)

## 2020-03-18 PROCEDURE — 80185 ASSAY OF PHENYTOIN TOTAL: CPT | Performed by: FAMILY MEDICINE

## 2020-03-22 DIAGNOSIS — Z53.9 DIAGNOSIS NOT YET DEFINED: Primary | ICD-10-CM

## 2020-03-22 PROCEDURE — G0180 MD CERTIFICATION HHA PATIENT: HCPCS | Performed by: FAMILY MEDICINE

## 2020-03-25 ENCOUNTER — VIRTUAL VISIT (OUTPATIENT)
Dept: FAMILY MEDICINE | Facility: CLINIC | Age: 75
End: 2020-03-25
Payer: COMMERCIAL

## 2020-03-25 DIAGNOSIS — G62.9 PERIPHERAL POLYNEUROPATHY: ICD-10-CM

## 2020-03-25 DIAGNOSIS — G89.4 CHRONIC PAIN SYNDROME: ICD-10-CM

## 2020-03-25 DIAGNOSIS — G40.909 SEIZURE DISORDER (H): ICD-10-CM

## 2020-03-25 DIAGNOSIS — M25.551 PAIN OF RIGHT HIP JOINT: Primary | ICD-10-CM

## 2020-03-25 PROCEDURE — 99214 OFFICE O/P EST MOD 30 MIN: CPT | Mod: TEL | Performed by: FAMILY MEDICINE

## 2020-03-25 RX ORDER — PHENYTOIN SODIUM 100 MG/1
100 CAPSULE, EXTENDED RELEASE ORAL 2 TIMES DAILY
Qty: 180 CAPSULE | Refills: 3 | Status: SHIPPED | OUTPATIENT
Start: 2020-03-25 | End: 2020-09-21

## 2020-03-25 RX ORDER — GABAPENTIN 400 MG/1
400 CAPSULE ORAL 2 TIMES DAILY
Qty: 180 CAPSULE | Refills: 3 | Status: SHIPPED | OUTPATIENT
Start: 2020-03-25 | End: 2020-09-21

## 2020-03-25 NOTE — PATIENT INSTRUCTIONS
"Ban Pteersen is a 75 year old female who is being evaluated via a billable telephone visit.      The patient has been notified of following:     \"This telephone visit will be conducted via a call between you and your physician/provider. We have found that certain health care needs can be provided without the need for a physical exam.  This service lets us provide the care you need with a short phone conversation.  If a prescription is necessary we can send it directly to your pharmacy.  If lab work is needed we can place an order for that and you can then stop by our lab to have the test done at a later time.    If during the course of the call the physician/provider feels a telephone visit is not appropriate, you will not be charged for this service.\"     Ban Petersen complains of    Chief Complaint   Patient presents with     Hospital F/U     TCU       1.  Pneumonia: reason for admission then nursing home stay after, breathing well now.     2.  Weakness after returning home:  Dilantin:  Ws in a fog when canme home, sedated, more confused  Decreased     Gabapentin 400mg twice daily and 200mg dilantin once daily.     After about 4 days able to start doing things for herself again. Initially at home could not walk, get out of bed.     When got home had hip pain, thinks when in hospital never got her out of bed. Still fairly high level of hip pain especially with physical therapy.  Hoping to resume T#3 treatment (supervised).  Using it now for physical therapy.     Physical therapy has been very helpful at this time. Feels like it has been \"like a miracle.'  More mobile and strong now actually than before the hospitalization.     3.  Dysphagia:  Had liquid meds in hospital, now changed back to oral pills and this is going quite well.  No choking, dysphagia.     I have reviewed and updated the patient's Past Medical History, Social History, Family History and Medication List.    Plan:   Sent refills of gabapentin " "and dilantin in pill form at current dose.   Sent refills of T #3.   utox not needed.   MME:[employer] morphine equivalent:  25.   No suspicious activity on [place]   I will mail copy of contract to review/sign/mail back.     Follow up six months.     Start 3:22, end 3:32  Phone call duration:  10 minutes    Joel Daniel Wegener, MD  ASSESSMENT AND PLAN  1. Chronic pain syndrome    - acetaminophen-codeine (TYLENOL #3) 300-30 MG tablet; 1-2 tablets three times daily as needed  Dispense: 160 tablet; Refill: 5    2. Peripheral polyneuropathy    - gabapentin (NEURONTIN) 400 MG capsule; Take 1 capsule (400 mg) by mouth 2 times daily  Dispense: 180 capsule; Refill: 3  - acetaminophen-codeine (TYLENOL #3) 300-30 MG tablet; 1-2 tablets three times daily as needed  Dispense: 160 tablet; Refill: 5    3. Seizure disorder (H)    - phenytoin (DILANTIN) 100 MG capsule; Take 1 capsule (100 mg) by mouth 2 times daily GIVE 100MG IN THE MORNING AND 200MG AT BEDTIME  Dispense: 180 capsule; Refill: 3    4. Pain of right hip joint  - acetaminophen-codeine (TYLENOL #3) 300-30 MG tablet; 1-2 tablets three times daily as needed  Dispense: 160 tablet; Refill: 5        MYCHART SIGN UP: [email or web address] , 1-386.202.1439    E-VISITS CAN BE DONE FOR CARE/PRESCRIPTIONS WHICH MAY NOT NEED AN IN-PERSON ASSESSMENT - click \"on-line care, then request e-visit\".      ONCARE VISIT/PRESCRIPTIONS: [email or web address]  - we treat nearly 50 common conditions with one hour response time     RADIOLOGY SCHEDULING  [facility]:  [phone]   [facility]: [phone]  [facility]: [phone]    Mammogram and Colonoscopy [phone]    Smoking Cessation: www.quitplan.org, 3-535-707-PLAN (1936)    Pharmacy savings card application: www.[email or web address]    CONSUMER PRICE LINE for estimates of test costs:  [phone]       "

## 2020-03-25 NOTE — LETTER
[facility]  03/25/20    Patient: Ban Petersen  YOB: 1945  Medical Record Number: [id number]                                                                  Opioid / Opioid Plus Controlled Substance Agreement    I understand that my care provider has prescribed an opioid (narcotic) controlled substance to help manage my condition(s). I am taking this medicine to help me function or work. I know this is strong medicine, and that it can cause serious side effects. Opioid medicine can be sedating, addicting and may cause a dependency on the drug. They can affect my ability to drive or think, and cause depression. They need to be taken exactly as prescribed. Combining opioids with certain medicines or chemicals (such as cocaine, sedatives and tranquilizers, sleeping pills, meth) can be dangerous or even fatal. Also, if I stop opioids suddenly, I may have severe withdrawal symptoms. Last, I understand that opioids do not work for all types of pain nor for all patients. If not helpful, I may be asked to stop them.        The risks, benefits, and side effects of these medicine(s) were explained to me. I agree that:    1. I will take part in other treatments as advised by my care team. This may be psychiatry or counseling, physical therapy, behavioral therapy, group treatment or a referral to a pain clinic. I will reduce or stop my medicine when my care team tells me to do so.  2. I will take my medicines as prescribed. I will not change the dose or schedule unless my care team tells me to. There will be no refills if I  run out early.   I may be contactedwithout warning and asked to complete a urine drug test or pill count at any time.   3. I will keep all my appointments, and understand this is part of the monitoring of opioids. My care team may require an office visit for EVERY opioid/controlled substance refill. If I miss appointments or don t follow instructions, my care team may stop my  medicine.  4. I will not ask other providers to prescribe controlled substances, and I will not accept controlled substances from other people. If I need another prescribed controlled substance for a new reason, I will tell my care team within 1 business day.  5. I will use one pharmacy to fill all of my controlled substance prescriptions, and it is up to me to make sure that I do not run out of my medicines on weekends or holidays. If my care team is willing to refill my opioid prescription without a visit, I must request refills only during office hours, refills may take up to 3 days to process, and it may take up to 5 to 7 days for my medicine to be mailed and ready at my pharmacy. Prescriptions will not be mailed anywhere except my pharmacy.        [id number]  Rev 12/18         Registration to scan to EHR                             Page 1 of 2               Controlled Substance Agreement Opioid        [facility]  03/25/20  Patient: Ban Petersen  YOB: 1945  Medical Record Number: [id number]                                                                  6. I am responsible for my prescriptions. If the medicine/prescription is lost or stolen, it will not be replaced. I also agree not to share controlled substance medicines with anyone.  7. I agree to not use ANY illegal or recreational drugs. This includes marijuana, cocaine, bath salts or other drugs. I agree not to use alcohol unless my care team says I may.          I agree to give urine samples whenever asked. If I don t give a urine sample, the care team may stop my medicine.    8. If I enroll in the [place] Medical Marijuana program, I will tell my care team. I will also sign an agreement to share my medical records with my care team.   9. I will bring in my list of medicines (or my medicine bottles) each time I come to the clinic.   10. I will tell my care team right away if I become pregnant or have a new medical problem  treated outside of my regular clinic.  11. I understand that this medicine can affect my thinking and judgment. It may be unsafe for me to drive, use machinery and do dangerous tasks. I will not do any of these things until I know how the medicine affects me. If my dose changes, I will wait to see how it affects me. I will contact my care team if I have concerns about medicine side effects.    I understand that if I do not follow any of the conditions above, my prescriptions or treatment may be stopped.      I agree that my provider, clinic care team, and pharmacy may work with any city, state or federal law enforcement agency that investigates the misuse, sale, or other diversion of my controlled medicine. I will allow my provider to discuss my care with or share a copy of this agreement with any other treating provider, pharmacy or emergency room where I receive care. I agree to give up (waive) any right of privacy or confidentiality with respect to these consents.     I have read this agreement and have asked questions about anything I did not understand.      ________________________________________________________________________  Patient signature - Date/Time -  Ban Petersen                                      ________________________________________________________________________  Witness signature                                                            ________________________________________________________________________  Provider signature - Joel Daniel Wegener, MD      [id number]  Rev 12/18         Registration to scan to EHR                         Page 2 of 2                   Controlled Substance Agreement Opioid           Page 1 of 2  Opioid Pain Medicines (also known as Narcotics)  What You Need to Know    What are opioids?   Opioids are pain medicines that must be prescribed by a doctor.  They are also known as narcotics.    Examples are:     morphine (MS Contin, Debra)    oxycodone  (Oxycontin)    oxycodone and acetaminophen (Percocet)    hydrocodone and acetaminophen (Vicodin, Norco)     fentanyl patch (Duragesic)     hydromorphone (Dilaudid)     methadone     What do opioids do well?   Opioids are best for short-term pain after a surgery or injury. They also work well for cancer pain. Unlike other pain medicines, they do not cause liver or kidney failure or ulcers. They may help some people with long-lasting (chronic) pain.     What do opioids NOT do well?   Opioids never get rid of pain entirely, and they do not work well for most patients with chronic pain. Opioids do not reduce swelling, one of the causes of pain. They also don t work well for nerve pain.                           For informational purposes only.  Not to replace the advice of your care provider.  Copyright [address] [facility]. All right reserved. [employer] 252868-Nbh 02/18.      Page 2 of 2    Risks and side effects   Talk to your doctor before you start or decide to keep taking one of these medicines. Side effects include:    Lowering your breathing rate enough to cause death    Overdose, including death, especially if taking higher than prescribed doses    Long-term opioid use    Worse depression symptoms; less pleasure in things you usually enjoy    Feeling tired or sluggish    Slower thoughts or cloudy thinking    Being more sensitive to pain over time; pain is harder to control    Trouble sleeping or restless sleep    Changes in hormone levels (for example, less testosterone)    Changes in sex drive or ability to have sex    Constipation    Unsafe driving    Itching and sweating    Feeling dizzy    Nausea, vomiting and dry mouth    What else should I know about opioids?  When someone takes opioids for too long or too often, they become dependent. This means that if you stop or reduce the medicine too quickly, you will have withdrawal symptoms.    Dependence is not the same as addiction. Addiction is when  people keep using a substance that harms their body, their mind or their relations with others. If you have a history of drug or alcohol abuse, taking opioids can cause a relapse.    Over time, opioids don t work as well. Most people will need higher and higher doses. The higher the dose, the more serious the side effects. We don t know the long-term effects of opioids.      Prescribed opioids aren't the best way to manage chronic pain    Other ways to manage pain include:      Ibuprofen or acetaminophen.  You should always try this first.      Treat health problems that may be causing pain.      acupuncture or massage, deep breathing, meditation, visual imagery, aromatherapy.      Use heat or ice at the pain site      Physical therapy and exercise      Stop smoking      See a counselor or therapist                                                  People who have used opioids for a long time may have a lower quality of life, worse depression, higher levels of pain and more visits to doctors.    Never share your opioids with others. Be sure to store opioids in a secure place, locked if possible.Young children can easily swallow them and overdose.     You can overdose on opioids.  Signs of overdose include decrease or loss of consciousness, slowed breathing, trouble waking and blue lips.  If someone is worried about overdose, they should call 911.    If you are at risk for overdose, you may get naloxone (Narcan, a medicine that reverses the effects of opioids.  If you overdose, a friend or family member can give you Narcan while waiting for the ambulance.  They need to know the signs of overdose and how to give Narcan.    While you're taking opioids:    Don't use alcohol or street drugs. Taking them together can cause death.    Don't take any of these medicines unless your doctor says its okay.  Taking these with opioids can cause death.    Benzodiazepines (such as lorazepam         or diazepam)    Muscle relaxers  (such as cyclobenzaprine)    sleeping pills    other opioids    Safe disposal of opioids  Find your area drug take-back program, your pharmacy mail-back program, buy a special disposal bag (such as Deterra) from your pharmacy or flush them down the toilet.  Use the guidelines at:  www.fda.gov/drugs/resourcesforyou

## 2020-03-25 NOTE — PROGRESS NOTES
"Ban Petersen is a 75 year old female who is being evaluated via a billable telephone visit.      The patient has been notified of following:     \"This telephone visit will be conducted via a call between you and your physician/provider. We have found that certain health care needs can be provided without the need for a physical exam.  This service lets us provide the care you need with a short phone conversation.  If a prescription is necessary we can send it directly to your pharmacy.  If lab work is needed we can place an order for that and you can then stop by our lab to have the test done at a later time.    If during the course of the call the physician/provider feels a telephone visit is not appropriate, you will not be charged for this service.\"     Ban Petersen complains of    Chief Complaint   Patient presents with     Hospital F/U     TCU       1.  Pneumonia: reason for admission then nursing home stay after, breathing well now.     2.  Weakness after returning home:  Dilantin:  Ws in a fog when canme home, sedated, more confused  Decreased     Gabapentin 400mg twice daily and 200mg dilantin once daily.     After about 4 days able to start doing things for herself again. Initially at home could not walk, get out of bed.     When got home had hip pain, thinks when in hospital never got her out of bed. Still fairly high level of hip pain especially with physical therapy.  Hoping to resume T#3 treatment (supervised).  Using it now for physical therapy.     Physical therapy has been very helpful at this time. Feels like it has been \"like a miracle.'  More mobile and strong now actually than before the hospitalization.     3.  Dysphagia:  Had liquid meds in hospital, now changed back to oral pills and this is going quite well.  No choking, dysphagia.     I have reviewed and updated the patient's Past Medical History, Social History, Family History and Medication List.    Plan:   Sent refills of gabapentin " "and dilantin in pill form at current dose.   Sent refills of T #3.   utox not needed.   MME:[employer] morphine equivalent:  25.   No suspicious activity on [place]   I will mail copy of contract to review/sign/mail back.     Follow up six months.     Start 3:22, end 3:34  Phone call duration:  12 minutes    Joel Daniel Wegener, MD  ASSESSMENT AND PLAN  1. Chronic pain syndrome    - acetaminophen-codeine (TYLENOL #3) 300-30 MG tablet; 1-2 tablets three times daily as needed  Dispense: 160 tablet; Refill: 5    2. Peripheral polyneuropathy    - gabapentin (NEURONTIN) 400 MG capsule; Take 1 capsule (400 mg) by mouth 2 times daily  Dispense: 180 capsule; Refill: 3  - acetaminophen-codeine (TYLENOL #3) 300-30 MG tablet; 1-2 tablets three times daily as needed  Dispense: 160 tablet; Refill: 5    3. Seizure disorder (H)    - phenytoin (DILANTIN) 100 MG capsule; Take 1 capsule (100 mg) by mouth 2 times daily GIVE 100MG IN THE MORNING AND 200MG AT BEDTIME  Dispense: 180 capsule; Refill: 3    4. Pain of right hip joint  - acetaminophen-codeine (TYLENOL #3) 300-30 MG tablet; 1-2 tablets three times daily as needed  Dispense: 160 tablet; Refill: 5        MYCHART SIGN UP: [email or web address] , 1-881.404.8790    E-VISITS CAN BE DONE FOR CARE/PRESCRIPTIONS WHICH MAY NOT NEED AN IN-PERSON ASSESSMENT - click \"on-line care, then request e-visit\".      ONCARE VISIT/PRESCRIPTIONS: [email or web address]  - we treat nearly 50 common conditions with one hour response time     RADIOLOGY SCHEDULING  [facility]:  [phone]   [facility]: [phone]  [facility]: [phone]    Mammogram and Colonoscopy [phone]    Smoking Cessation: www.quitplan.org, 6-868-050-PLAN (3373)    Pharmacy savings card application: www.[employer]    CONSUMER PRICE LINE for estimates of test costs:  [phone]         "

## 2020-03-25 NOTE — LETTER
[place] [id number] [address] [phone]          March 25, 2020    Ban Petersen                                                                                                                     [facility] [address]            Dear Ban,      Please sign controlled substances agreement and mail it back to the clinic.      Sincerely,         Joel Daniel Irwin Wegener MD.

## 2020-03-27 ENCOUNTER — PATIENT OUTREACH (OUTPATIENT)
Dept: GERIATRIC MEDICINE | Facility: CLINIC | Age: 75
End: 2020-03-27

## 2020-03-27 NOTE — PROGRESS NOTES
[facility] Care Coordination Contact      [facility] Six-Month Telephone Assessment    6 month telephone assessment completed on 03/27/20.    [facility] [place] [facility]  [facility]: [facility] [place]  TCU stays: Yes- [facility] for rehab after hospital stay for SBO.   Significant health status changes: Was very weak when she first got home from the TCU but according to her guardian she is doing much better at this time. She received PT and PCA is helping with exercises.   Falls/Injuries: Yes: No injuries   ADL/IADL changes: No  Changes in services: No    Caregiver Assessment follow up:  N/A    Goals: See POC in chart for goal progress documentation.      Will see member in 6 months for an annual health risk assessment.   Encouraged member to call CC with any questions or concerns in the meantime.   Hillary Romano RN  [facility] Care Coordinator  [phone]

## 2020-03-30 ENCOUNTER — PATIENT OUTREACH (OUTPATIENT)
Dept: CARE COORDINATION | Facility: CLINIC | Age: 75
End: 2020-03-30

## 2020-03-30 DIAGNOSIS — J69.0 ASPIRATION PNEUMONIA, UNSPECIFIED ASPIRATION PNEUMONIA TYPE, UNSPECIFIED LATERALITY, UNSPECIFIED PART OF LUNG (H): Primary | ICD-10-CM

## 2020-03-30 NOTE — PROGRESS NOTES
Clinic Care Coordination Contact    Situation: Patient chart reviewed by care coordinator.    Background: received notification that patient no longer has home care services    Assessment: chart review reveals patient has a [facility] care coordinator who contacted patient 3/27/2020     Plan/Recommendations: will not contact patient due to patient working with [facility] care coordinator    April Salcedo RN  [facility]-Care Coordination  [facility]  [facility]  [phone]

## 2020-08-04 ENCOUNTER — PATIENT OUTREACH (OUTPATIENT)
Dept: GERIATRIC MEDICINE | Facility: CLINIC | Age: 75
End: 2020-08-04

## 2020-08-04 NOTE — PROGRESS NOTES
[facility] Care Coordination Contact    Called member's marilyn Flores to schedule annual HRA home visit. HRA has been scheduled for 08/13/20 at 2:00.   Hillary Romano RN  [facility] Care Coordinator  [phone]

## 2020-08-13 ENCOUNTER — PATIENT OUTREACH (OUTPATIENT)
Dept: GERIATRIC MEDICINE | Facility: CLINIC | Age: 75
End: 2020-08-13

## 2020-08-13 ENCOUNTER — DOCUMENTATION ONLY (OUTPATIENT)
Dept: FAMILY MEDICINE | Facility: CLINIC | Age: 75
End: 2020-08-13

## 2020-08-13 RX ORDER — MULTIVIT WITH MINERALS/LUTEIN
1 TABLET ORAL DAILY
COMMUNITY

## 2020-08-14 NOTE — PROGRESS NOTES
[facility] Care Coordination Contact    [facility] Home Visit Assessment     Telephone visit for Health Risk Assessment with Ban Petersen completed on August 13, 2020 due to Covid-19 restrictions.     Type of residence:: Private home - no stairs  Current living arrangement:: I live in a private home     Assessment completed with:: Patient, Family    Current Care Plan  Member currently receiving the following home care services:     Member currently receiving the following community resources: DME, PCA      Medication Review  Medication reconciliation completed in Epic: Yes  Medication set-up & administration: Family/informal caregiver sets up daily.  Family caregiver administers medications.  Medication Risk Assessment Medication (1 or more, place referral to MTM): N/A: No risk factors identified  MTM Referral Placed: No: No risk factors idenified    Mental/Behavioral Health   Depression Screening:   PHQ-2 Total Score (Adult) - Positive if 3 or more points; Administer PHQ-9 if positive: 0       Mental health DX:: No        Falls Assessment:   Fallen 2 or more times in the past year?: No   Any fall with injury in the past year?: Yes    ADL/IADL Dependencies:   Dependent ADLs:: Bathing, Incontinence, Positioning, Transfers, Wheelchair-with assist, Toileting, Dressing, Eating, Grooming  Dependent IADLs:: Cleaning, Cooking, Laundry, Shopping, Meal Preparation, Medication Management, Money Management, Transportation, Incontinence    [facility] Plan sponsored benefits: Shared information re: Silver Sneakers/gym memberships, ASA, Calcium +D.    PCA Assessment completed at visit: Yes Annual PCA assessment indicated 46 units per day of PCA. This is the same as the previous assessment.     Elderly Waiver Eligibility: Yes-will continue on EW    Care Plan & Recommendations: Continue PCA services and incontinence supplies.     See CC for detailed assessment information.    Follow-Up Plan: Member informed of  future contact, plan to f/u with member with a 6 month telephone assessment.  Contact information shared with member and family, encouraged member to call with any questions or concerns at any time.    [place] care continuum providers: Please refer to Health Care Home on the Epic Problem List to view this patient's [facility] Care Plan Summary.  Hillary Romano RN  [facility] Care Coordinator  [phone]

## 2020-08-19 NOTE — PROGRESS NOTES
[facility]    Chief Complaint   Patient presents with     ELIZ       [facility] Medical Record Number:  [id number]    HPI:    Ban Petersen is a 73 year old  (1945), who is being seen today for an episodic care visit at [facility]  Hospital [place] [facility] [place] through 6/1/18. PMH TBI, cognitive impairment and right sided paralysis admitted on 5/30/18 after mechanical fall at home, found to have right proximal humerus fracture . Fracture was dicussed with ortho service who recommend conservative non-operative management, sling for comfort, and ortho follow up.     Admitted to this facility for  rehab, medical management and nursing care.     HPI information obtained from: facility chart records, facility staff, patient report and [facility] chart review.    Today's concern is:  HPI is challenging, patient is rambling    Closed fracture of proximal end of right humerus, unspecified fracture morphology, sequela  This is her flaccid arm s/p TBI. Taking oxycodone at times for pain. Bruising and swelling improved    H/O traumatic brain injury  Cognitive impairment  Paralysis (H)  Lives with her two brothers who are very involved. They have had several conversations with staff about how to care for Wen. She is currently max assist for transfers and ADLs. BIMS 7/15. Legally blind. She has been happier since her family brought in her large TV. Social work plans on having a care conference to determine if she is basically at baseline and whether she should return home.    Seizure disorder (H)  Chronic. No witnessed seizures since admission    Slow transit constipation  Patient unable to say if bowels are moving. Nursing has no acute concerns      ALLERGIES: No known drug allergies  Past Medical, Surgical, Family and Social History reviewed and updated in [facility].    Current Outpatient Prescriptions   Medication Sig Dispense Refill     Acetaminophen (TYLENOL PO) Take 975 mg by mouth  "every 6 hours as needed for mild pain or fever       Calcium Carb-Cholecalciferol 600-200 MG-UNIT TABS Take 1 tablet by mouth daily       gabapentin (NEURONTIN) 400 MG capsule TAKE 1 CAPSULE(400 MG) BY MOUTH TWICE DAILY 180 capsule 3     levETIRAcetam (KEPPRA XR) 750 MG 24 hr tablet TAKE 1 TABLET BY MOUTH DAILY 90 tablet 1     LORazepam (ATIVAN) 0.5 MG tablet Take 30 minutes prior to eye visits with injections as needed, may repeat in 1-4 hours as needed. 30 tablet 1     multivitamin, therapeutic with minerals (MULTI-VITAMIN) TABS Take 2 tablets by mouth daily Preservision AREDS        ORDER FOR DME One pair of knee high compression stockings of medium compression.  Mid-calf circumference is 30cm. 1 each 0     OXYCODONE HCL PO Take 5 mg by mouth every 4 hours as needed       Phenytoin Sodium Extended 200 MG CAPS Take 200 mg by mouth daily 90 capsule 3     polyethylene glycol (MIRALAX/GLYCOLAX) Packet Take 17 g by mouth daily as needed for constipation       Medications reviewed:  Medications reconciled to facility chart and changes were made to reflect current medications as identified as above med list.    REVIEW OF SYSTEMS:  Unobtainable secondary to cognitive impairment.     Physical Exam:  BP 97/67  Pulse 94  Temp 97.4  F (36.3  C)  Resp 18  Ht 5' 4\" (1.626 m)  Wt 152 lb 9.6 oz (69.2 kg)  SpO2 94%  BMI 26.19 kg/m2     GENERAL APPEARANCE:  Alert, watching TV, no apparent distress  ENT:  Mouth and posterior oropharynx normal, moist mucous membranes  EYES:  EOM normal, Conjunctiva and lids normal, difficulty opening R eye  NECK:  No adenopathy,masses or thyromegaly  RESP:  respiratory effort and palpation of chest normal, lungs clear to auscultation , no respiratory distress  CV:  Palpation and auscultation of heart done , regular rate and rhythm, no murmur, rub, or gallop  ABDOMEN:  normal bowel sounds, soft, nontender, no hepatosplenomegaly or other masses  SKIN:  Resolving bruises R arm  PSYCH:  oriented " to self, insight and judgement impaired, memory impaired       Recent Labs:     CBC RESULTS:   Recent Labs   Lab Test  04/10/18   1647  09/26/17   1603   WBC  6.1  6.9   RBC  4.25  4.21   HGB  13.8  13.7   HCT  42.6  41.9   MCV  100  100   MCH  32.5  32.5   MCHC  32.4  32.7   RDW  12.6  12.7   PLT  210  229       Last Basic Metabolic Panel:  Recent Labs   Lab Test  04/10/18   1647  09/26/17   1603   NA  143  143   POTASSIUM  4.9  4.6   CHLORIDE  112*  110*   MILA  9.7  9.6   CO2  22  22   BUN  19  18   CR  0.71  0.60   GLC  84  75       Liver Function Studies -   Recent Labs   Lab Test  04/10/18   1647  09/26/17   1603   PROTTOTAL  7.5  7.3   ALBUMIN  3.8  3.7   BILITOTAL  0.2  0.2   ALKPHOS  83  85   AST  15  17   ALT  12  19       TSH   Date Value Ref Range Status   06/13/2018 3.81 0.30 - 5.00 uIU/mL Final   03/30/2011 0.77 0.4 - 5.0 mU/L Final     Lab Results   Component Value Date    A1C 5.0 11/03/2012         Assessment/Plan:  (S42.201S) Closed fracture of proximal end of right humerus, unspecified fracture morphology, sequela  (primary encounter diagnosis)  Comment: Non-surgical treatment. Pain is difficult to assess. It is unknown if she will need to remain in TCU while her fracture heals or if she could go home.  Plan: PT/OT eval and treat, discharge planning per their recommendation. Continue oxycodone prn for pain, monitor pain severity. F/u ortho as scheduled. Care conference per social work    (Z87.820) H/O traumatic brain injury  (R41.89) Cognitive impairment  Comment: Mental status likely at baseline, suspect functional status is as well  Plan: Nursing to monitor mental status. 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 Wheelchair bound. Family provides ADL assistance.  Plan: Continue current POC with no changes at this time and adjustments as needed.    (G40.902) Seizure disorder (H)  Comment: Chronic condition being managed with  medications.  Plan: Continue current POC with no changes at this time and adjustments as needed.    (K59.01) Slow transit constipation  Comment: No acute concerns, will need to monitor while on oxycodone  Plan: Cont Miralax prn. Monitor bowel function. Adjust medication as clinically indicated.      Electronically signed by  MILES Kumar [facility]  Pager: [phone]                 [facility] Counseling: I discussed with the patient the risks of ixekizumab including but not limited to immunosuppression, serious infections, worsening of inflammatory bowel disease and drug reactions.  The patient understands that monitoring is required including a PPD at baseline and must alert us or the primary physician if symptoms of infection or other concerning signs are noted.

## 2020-08-21 ENCOUNTER — PATIENT OUTREACH (OUTPATIENT)
Dept: GERIATRIC MEDICINE | Facility: CLINIC | Age: 75
End: 2020-08-21

## 2020-08-21 NOTE — PROGRESS NOTES
[facility] Care Coordination Contact    Received after visit chart from care coordinator.  Completed following tasks: Mailed copy of care plan to client, Updated services in access and mailed POC & PCA sig pages w/SASE  , Provider Signature - No POC Shared:  Member indicates that they do not want their POC shared with any [facility] providers.    [facility]:  Emailed completed PCA assessment to [facility].  Faxed copy of PCA assessment to [facility] Agency and mailed copy to member.  Faxed MD Communication to PCP.   Melita Lange  Case Management Specialist  [facility]  [phone]

## 2020-08-21 NOTE — LETTER
August 21, 2020      ETIENNE GREWAL  [address]  [place] [address]      Dear Etienne:    At [facility], we are dedicated to improving your health and well-being. Enclosed is the Comprehensive Care Plan that we developed with you on 8/13/2020. Please review the Care Plan carefully.    As a reminder, some of the things we discussed at your visit include:    Your physical and mental health    Ways to reduce falls    Health care needs you may have    Don t forget to contact your care coordinator if you:    Have been hospitalized or plan to be hospitalized     Have had a fall     Have experienced a change in physical health    Are experiencing emotional problems     If you do not agree with your Care Plan, have questions about it, or have experienced a change in your needs, please call me at [phone]. If you are hearing impaired, please call the [place] [employer] at 121 or [phone] (ghkecq-fo-tsufeh relay service).    Sincerely,    Hillary Romano RN, MA    E-mail: [email or web address]  Phone: [phone]      [facility] ([facility]) is a health plan that contracts with both Medicare and the [place] Medical Assistance (Medicaid) program to provide benefits of both programs to enrollees. Enrollment in [facility] depends on contract renewal.    [id number])     [id number] (11/18)

## 2020-09-21 ENCOUNTER — TELEPHONE (OUTPATIENT)
Dept: FAMILY MEDICINE | Facility: CLINIC | Age: 75
End: 2020-09-21

## 2020-09-21 DIAGNOSIS — G89.4 CHRONIC PAIN SYNDROME: ICD-10-CM

## 2020-09-21 DIAGNOSIS — G62.9 PERIPHERAL POLYNEUROPATHY: ICD-10-CM

## 2020-09-21 DIAGNOSIS — M25.551 PAIN OF RIGHT HIP JOINT: ICD-10-CM

## 2020-09-21 DIAGNOSIS — G40.909 SEIZURE DISORDER (H): ICD-10-CM

## 2020-09-21 RX ORDER — PHENYTOIN SODIUM 100 MG/1
100 CAPSULE, EXTENDED RELEASE ORAL 2 TIMES DAILY
Qty: 180 CAPSULE | Refills: 1 | Status: SHIPPED | OUTPATIENT
Start: 2020-09-21 | End: 2021-05-24

## 2020-09-21 RX ORDER — GABAPENTIN 400 MG/1
400 CAPSULE ORAL 2 TIMES DAILY
Qty: 180 CAPSULE | Refills: 3 | Status: SHIPPED | OUTPATIENT
Start: 2020-09-21 | End: 2021-09-09

## 2020-09-21 NOTE — TELEPHONE ENCOUNTER
Reason for call:  Medication   If this is a refill request, has the caller requested the refill from the pharmacy already? No  Will the patient be using a [facility]? No  Name of the pharmacy and phone number for the current request: [facility] [phone]    Name of the medication requested: acetaminophen-codeine (TYLENOL #3) 300-30 MG tablet    Other request: gabapentin (NEURONTIN) 400 MG capsule    phenytoin (DILANTIN) 100 MG capsule    Phone number to reach patient:  Home number on file [phone] (home)    Best Time:  Anytime    Can we leave a detailed message on this number?  YES    Travel screening: Not Applicable

## 2021-01-04 ENCOUNTER — TELEPHONE (OUTPATIENT)
Dept: FAMILY MEDICINE | Facility: CLINIC | Age: 76
End: 2021-01-04

## 2021-01-04 NOTE — TELEPHONE ENCOUNTER
Reason for Call:  Form, our goal is to have forms completed with 72 hours, however, some forms may require a visit or additional information.    Type of letter, form or note:   Pull ups     Who is the form from?: [employer] (if other please explain)    Where did the form come from: form was faxed in    What clinic location was the form placed at?:     Where the form was placed: [facility] Box/Folder    What number is listed as a contact on the form?:        Additional comments:     Call taken on 1/4/2021 at 10:08 AM by Min Chong

## 2021-02-11 ENCOUNTER — PATIENT OUTREACH (OUTPATIENT)
Dept: GERIATRIC MEDICINE | Facility: CLINIC | Age: 76
End: 2021-02-11

## 2021-02-14 NOTE — PROGRESS NOTES
[facility] Care Coordination Contact      [facility] Six-Month Telephone Assessment    6 month telephone assessment completed on 02/11/21.    ER visits: No  Hospitalizations: No  TCU stays: No  Significant health status changes: None  Falls/Injuries: No  ADL/IADL changes: No  Changes in services: No    Caregiver Assessment follow up:  Member continues to receive assistance from her family.    Goals: See POC in chart for goal progress documentation.      Will see member in 6 months for an annual health risk assessment.   Encouraged member to call CC with any questions or concerns in the meantime.     Hillary Romano RN  [facility] Care Coordinator  [phone]

## 2021-02-18 ENCOUNTER — TELEPHONE (OUTPATIENT)
Dept: FAMILY MEDICINE | Facility: CLINIC | Age: 76
End: 2021-02-18

## 2021-02-18 NOTE — TELEPHONE ENCOUNTER
Reason for Call:  Form, our goal is to have forms completed with 72 hours, however, some forms may require a visit or additional information.    Type of letter, form or note:  Form: rental hosp bed    Who is the form from?: [employer] (if other please explain)    Where did the form come from: form was faxed in    What clinic location was the form placed at?:   [facility]    Where the form was placed: on Dr. Wegener's desk    What number is listed as a contact on the form?:   Fax: [phone]       Additional comments: none    Call taken on 2/18/2021 at 12:39 PM by Janay Syed

## 2021-03-04 ENCOUNTER — TELEPHONE (OUTPATIENT)
Dept: FAMILY MEDICINE | Facility: CLINIC | Age: 76
End: 2021-03-04

## 2021-03-04 NOTE — LETTER
[facility]  [address]  [place] [address] [phone]       March 4, 2021    Ban Petersen  [address]  [place] [address]    Dear Wen,    We care about your health and have reviewed your health plan and are making recommendations based on this review, to optimize your health.    You are in particular need of attention regarding:  -Breast Cancer Screening  -Colon Cancer Screening  -Wellness (Physical) Visit     We are recommending that you:  -schedule a WELLNESS (Physical) APPOINTMENT with Dr. Wegener - this can be a virtual visit if preferred.     -schedule a MAMMOGRAM which is due.    1 in 8 women will develop invasive breast cancer during her lifetime and it is the most common non-skin cancer in [place] women.  EARLY detection, new treatments, and a better understanding of the disease have increased survival rates - the 5 year survival rate in the 1960s was 63% and today it is close to 90%.    PLEASE CALL TO SCHEDULE YOUR MAMMOGRAM  [facility] [phone]  [facility] [phone]  [facility]   [phone]  Central Scheduling (all locations) [phone]     If you are under/uninsured, we recommend you contact the [employer] [facility]. They offer mammograms at no charge or on a sliding fee charge. You can schedule with them at [phone]. Please have them send us the results.      Please disregard this reminder if you have had this exam elsewhere within the last year.  It would be helpful for us to have a copy of your mammogram report in your file so that we can best coordinate your care - please contact us with when your test was done so we can update your record.             -schedule a COLONOSCOPY to look for colon cancer (due every 10 years or 5 years in higher risk situations.)        Colon cancer is now the second leading cause of cancer-related deaths in the [place] for both men and women  and there are over 130,000 new cases and 50,000 deaths per year from colon cancer.  Colonoscopies can prevent 90-95% of these deaths.  Problem lesions can be removed before they ever become cancer.  This test is not only looking for cancer, but also getting rid of precancerious lesions.    If you are under/uninsured, we recommend you contact the [employer] program. [employer] is a free colorectal cancer screening program that provides colonoscopies for eligible under/uninsured [place] men and women. If you are interested in receiving a free colonoscopy, please call [employer] at [phone] (mention code [employer]) to see if you re eligible.      If you do not wish to do a colonoscopy or cannot afford to do one, at this time, there is another option. It is called a FIT test or Fecal Immunochemical Occult Blood Test (take home stool sample kit).  It does not replace the colonoscopy for colorectal cancer screening, but it can detect hidden bleeding in the lower colon.  It does need to be repeated every year and if a positive result is obtained, you would be referred for a colonoscopy.          If you have completed either one of these tests at another facility, please call with the details of when and where the tests were done and if they were normal or not. Or have the records sent to our clinic so that we can best coordinate your care.    To address the above recommendations, we encourage you to contact us at [phone], via [employer] or by contacting Central Scheduling toll free at [phone] 24 hours a day. They will assist you with finding the most convenient time and location.    Thank you for trusting [facility] and we appreciate the opportunity to serve you.  We look forward to supporting your healthcare needs in the future.    Healthy Regards,    Your [facility] Team

## 2021-03-05 NOTE — TELEPHONE ENCOUNTER
Patient Quality Outreach      Summary:    Patient has the following on her problem list/HM: None    Patient is due/failing the following:   FIT, Breast Cancer Screening - Mammogram and Annual wellness, date due: 1/16/2011    Type of outreach:    Sent letter.    Questions for provider review:    None                                                                                                                                     Zenia Neves MA on 3/4/2021 at 6:20 PM       Chart routed to .

## 2021-03-12 ENCOUNTER — VIRTUAL VISIT (OUTPATIENT)
Dept: FAMILY MEDICINE | Facility: CLINIC | Age: 76
End: 2021-03-12
Payer: COMMERCIAL

## 2021-03-12 DIAGNOSIS — G62.9 PERIPHERAL POLYNEUROPATHY: ICD-10-CM

## 2021-03-12 DIAGNOSIS — G89.4 CHRONIC PAIN SYNDROME: ICD-10-CM

## 2021-03-12 DIAGNOSIS — G40.909 SEIZURE DISORDER (H): ICD-10-CM

## 2021-03-12 DIAGNOSIS — S06.9X9S TRAUMATIC BRAIN INJURY WITH LOSS OF CONSCIOUSNESS, SEQUELA (H): ICD-10-CM

## 2021-03-12 DIAGNOSIS — M25.551 PAIN OF RIGHT HIP JOINT: ICD-10-CM

## 2021-03-12 DIAGNOSIS — F11.90 CHRONIC, CONTINUOUS USE OF OPIOIDS: ICD-10-CM

## 2021-03-12 PROCEDURE — 99214 OFFICE O/P EST MOD 30 MIN: CPT | Mod: 95 | Performed by: FAMILY MEDICINE

## 2021-03-12 NOTE — LETTER
Opioid / Opioid Plus Controlled Substance Agreement    This is an agreement between you and your provider about the safe and appropriate use of controlled substance/opioids prescribed by your care team. Controlled substances are medicines that can cause physical and mental dependence (abuse).    There are strict laws about having and using these medicines. We here at [facility] are committing to working with you in your efforts to get better. To support you in this work, we ll help you schedule regular office appointments for medicine refills. If we must cancel or change your appointment for any reason, we ll make sure you have enough medicine to last until your next appointment.     As a Provider, I will:    Listen carefully to your concerns and treat you with respect.     Recommend a treatment plan that I believe is in your best interest. This plan may involve therapies other than opioid pain medication.     Talk with you often about the possible benefits, and the risk of harm of any medicine that we prescribe for you.     Provide a plan on how to taper (discontinue or go off) using this medicine if the decision is made to stop its use.    As a Patient, I understand that opioid(s):     Are a controlled substance prescribed by my care team to help me function or work and manage my condition(s).     Are strong medicines and can cause serious side effects such as:    Drowsiness, which can seriously affect my driving ability    A lower breathing rate, enough to cause death    Harm to my thinking ability     Depression     Abuse of and addiction to this medicine    Need to be taken exactly as prescribed. Combining opioids with certain medicines or chemicals (such as illegal drugs, sedatives, sleeping pills, and benzodiazepines) can be dangerous or even fatal. If I stop opioids suddenly, I may have severe withdrawal symptoms.    Do not work for all types of pain nor for all patients. If they re not helpful, I may  be asked to stop them.        The risks, benefits and side effects of these medicine(s) were explained to me. I agree that:  1. I will take part in other treatments as advised by my care team. This may be psychiatry or counseling, physical therapy, behavioral therapy, group treatment or a referral to a specialist.     2. I will keep all my appointments. I understand that this is part of the monitoring of opioids. My care team may require an office visit for EVERY opioid/controlled substance refill. If I miss appointments or don t follow instructions, my care team may stop my medicine.    3. I will take my medicines as prescribed. I will not change the dose or schedule unless my care team tells me to. There will be no refills if I run out early.     4. I may be asked to come to the clinic and complete a urine drug test or complete a pill count at any time. If I don t give a urine sample or participate in a pill count, the care team may stop my medicine.    5. I will only receive prescriptions from this clinic for chronic pain. If I am treated by another provider for acute pain issues, I will tell them that I am taking opioid pain medication for chronic pain and that I have a treatment agreement with this provider. I will inform my [facility] care team within one business day if I am given a prescription for any pain medication by another healthcare provider. My [facility] care team can contact other providers and pharmacists about my use of any medicines.    6. It is up to me to make sure that I don t run out of my medicines on weekends or holidays. If my care team is willing to refill my opioid prescription without a visit, I must request refills only during office hours. Refills may take up to 3 business days to process. I will use one pharmacy to fill all my opioid and other controlled substance prescriptions. I will notify the clinic about any changes to my insurance or medication  availability.    7. I am responsible for my prescriptions. If the medicine/prescription is lost, stolen or destroyed, it will not be replaced. I also agree not to share controlled substance medicines with anyone.    8. I am aware I should not use any illegal or recreational drugs. I agree not to drink alcohol unless my care team says I can.       9. If I enroll in the [place] Medical Cannabis program, I will tell my care team prior to my next refill.     10. I will tell my care team right away if I become pregnant, have a new medical problem treated outside of my regular clinic, or have a change in my medications.    11. I understand that this medicine can affect my thinking, judgment and reaction time. Alcohol and drugs affect the brain and body, which can affect the safety of my driving. Being under the influence of alcohol or drugs can affect my decision-making, behaviors, personal safety, and the safety of others. Driving while impaired (DWI) can occur if a person is driving, operating, or in physical control of a car, motorcycle, boat, snowmobile, ATV, motorbike, off-road vehicle, or any other motor vehicle ([place] Statute 169A.20). I understand the risk if I choose to drive or operate any vehicle or machinery.    I understand that if I do not follow any of the conditions above, my prescriptions or treatment may be stopped or changed.          Opioids  What You Need to Know    What are opioids?   Opioids are pain medicines that must be prescribed by a doctor. They are also known as narcotics.     Examples are:   1. morphine (MS Contin, Debra)  2. oxycodone (Oxycontin)  3. oxycodone and acetaminophen (Percocet)  4. hydrocodone and acetaminophen (Vicodin, Norco)   5. fentanyl patch (Duragesic)   6. hydromorphone (Dilaudid)   7. methadone  8. codeine (Tylenol #3)     What do opioids do well?   Opioids are best for severe short-term pain such as after a surgery or injury. They may work well for cancer pain. They may  help some people with long-lasting (chronic) pain.     What do opioids NOT do well?   Opioids never get rid of pain entirely, and they don t work well for most patients with chronic pain. Opioids don t reduce swelling, one of the causes of pain.                                    Other ways to manage chronic pain and improve function include:       Treat the health problem that may be causing pain    Anti-inflammation medicines, which reduce swelling and tenderness, such as ibuprofen (Advil, Motrin) or naproxen (Aleve)    Acetaminophen (Tylenol)    Antidepressants and anti-seizure medicines, especially for nerve pain    Topical treatments such as patches or creams    Injections or nerve blocks    Chiropractic or osteopathic treatment    Acupuncture, massage, deep breathing, meditation, visual imagery, aromatherapy    Use heat or ice at the pain site    Physical therapy     Exercise    Stop smoking    Take part in therapy       Risks and side effects     Talk to your doctor before you start or decide to keep taking opioids. Possible side effects include:      Lowering your breathing rate enough to cause death    Overdose, including death, especially if taking higher than prescribed doses    Worse depression symptoms; less pleasure in things you usually enjoy    Feeling tired or sluggish    Slower thoughts or cloudy thinking    Being more sensitive to pain over time; pain is harder to control    Trouble sleeping or restless sleep    Changes in hormone levels (for example, less testosterone)    Changes in sex drive or ability to have sex    Constipation    Unsafe driving    Itching and sweating    Dizziness    Nausea, throwing up and dry mouth    What else should I know about opioids?    Opioids may lead to dependence, tolerance, or addiction.      Dependence means that if you stop or reduce the medicine too quickly, you will have withdrawal symptoms. These include loose poop (diarrhea), jitters, flu-like symptoms,  nervousness and tremors. Dependence is not the same as addiction.                       Tolerance means needing higher doses over time to get the same effect. This may increase the chance of serious side effects.      Addiction is when people improperly use a substance that harms their body, their mind or their relations with others. Use of opiates can cause a relapse of addiction if you have a history of drug or alcohol abuse.      People who have used opioids for a long time may have a lower quality of life, worse depression, higher levels of pain and more visits to doctors.    You can overdose on opioids. Take these steps to lower your risk of overdose:    1. Recognize the signs:  Signs of overdose include decrease or loss of consciousness (blackout), slowed breathing, trouble waking up and blue lips. If someone is worried about overdose, they should call 911.    2. Talk to your doctor about Narcan (naloxone).   If you are at risk for overdose, you may be given a prescription for Narcan. This medicine very quickly reverses the effects of opioids.   If you overdose, a friend or family member can give you Narcan while waiting for the ambulance. They need to know the signs of overdose and how to give Narcan.     3. Don't use alcohol or street drugs.   Taking them with opioids can cause death.    4. Do not take any of these medicines unless your doctor says it s OK. Taking these with opioids can cause death:    Benzodiazepines, such as lorazepam (Ativan), alprazolam (Xanax) or diazepam (Valium)    Muscle relaxers, such as cyclobenzaprine (Flexeril)    Sleeping pills like zolpidem (Ambien)     Other opioids      How to keep you and other people safe while taking opioids:    1. Never share your opioids with others.  Opioid medicines are regulated by the Drug Enforcement Agency (GELY). Selling or sharing medications is a criminal act.    2. Be sure to store opioids in a secure place, locked up if possible. Young children  can easily swallow them and overdose.    3. When you are traveling with your medicines, keep them in the original bottles. If you use a pill box, be sure you also carry a copy of your medicine list from your clinic or pharmacy.    4. Safe disposal of opioids    Most pharmacies have places to get rid of medicine, called disposal kiosks. Medicine disposal options are also available in every [facility]. Search your county and  medication disposal  to find more options. You can find more details at:  [email or web address]     I agree that my provider, clinic care team, and pharmacy may work with any city, state or federal law enforcement agency that investigates the misuse, sale, or other diversion of my controlled medicine. I will allow my provider to discuss my care with, or share a copy of, this agreement with any other treating provider, pharmacy or emergency room where I receive care.    I have read this agreement and have asked questions about anything I did not understand.    _______________________________________________________  Patient Signature - Ban Petersen _____________________                   Date     _______________________________________________________  Provider Signature - Joel Daniel Wegener, MD   _____________________                   Date     _______________________________________________________  Witness Signature (required if provider not present while patient signing)   _____________________                   Date

## 2021-03-12 NOTE — PATIENT INSTRUCTIONS
H/o stroke, stubborn, doesn't understand mask.  Lives with two brothers.  Cared for by niece sandra. Hoping to get covid vaccine at home for kvng.  Sandra is getting her covid vaccine set up.     Needing T #3 refill for march 26.      Will mail contract to sandra to review/sign/return.     Sandra Whitman  [id number] [place] [address]    10 minutes total.     Follow up 5-6 months for physical.

## 2021-03-12 NOTE — NURSING NOTE
"Chief Complaint   Patient presents with     RECHECK     initial There were no vitals taken for this visit. Estimated body mass index is 23.06 kg/m  as calculated from the following:    Height as of 3/2/20: 1.651 m (5' 5\").    Weight as of 3/2/20: 62.9 kg (138 lb 9.6 oz).  BP completed using cuff size: .  L  R arm      Health Maintenance that is potentially due pending provider review:      Keven Moreland ma  "
100% of the time

## 2021-03-18 ENCOUNTER — DOCUMENTATION ONLY (OUTPATIENT)
Dept: OTHER | Facility: CLINIC | Age: 76
End: 2021-03-18

## 2021-05-28 ENCOUNTER — IMMUNIZATION (OUTPATIENT)
Dept: NURSING | Facility: CLINIC | Age: 76
End: 2021-05-28
Payer: COMMERCIAL

## 2021-05-28 PROCEDURE — 0001A PR COVID VAC PFIZER DIL RECON 30 MCG/0.3 ML IM: CPT

## 2021-05-28 PROCEDURE — 91300 PR COVID VAC PFIZER DIL RECON 30 MCG/0.3 ML IM: CPT

## 2021-06-18 ENCOUNTER — IMMUNIZATION (OUTPATIENT)
Dept: NURSING | Facility: CLINIC | Age: 76
End: 2021-06-18
Payer: COMMERCIAL

## 2021-06-18 PROCEDURE — 0002A PR COVID VAC PFIZER DIL RECON 30 MCG/0.3 ML IM: CPT

## 2021-06-18 PROCEDURE — 91300 PR COVID VAC PFIZER DIL RECON 30 MCG/0.3 ML IM: CPT

## 2021-07-08 ENCOUNTER — PATIENT OUTREACH (OUTPATIENT)
Dept: GERIATRIC MEDICINE | Facility: CLINIC | Age: 76
End: 2021-07-08

## 2021-07-08 NOTE — PROGRESS NOTES
[facility] Care Coordination Contact    CC contacted member's PCA Sandra Rivero to schedule a telephone appointment for annual assessment. She will talk with member's guardian, Johnson and call back to schedule a date and time for assessment.  Hillary Romano RN  [facility] Care Coordinator  [phone]

## 2021-07-09 NOTE — PROGRESS NOTES
[facility] Care Coordination Contact    CC received a call back from member's marilyn Flores and annual assessment scheduled for 07/20 at 3:00.  Hillary Romano RN  [facility] Care Coordinator  [phone]

## 2021-07-20 ENCOUNTER — PATIENT OUTREACH (OUTPATIENT)
Dept: GERIATRIC MEDICINE | Facility: CLINIC | Age: 76
End: 2021-07-20

## 2021-07-21 ENCOUNTER — TELEPHONE (OUTPATIENT)
Dept: FAMILY MEDICINE | Facility: CLINIC | Age: 76
End: 2021-07-21

## 2021-07-21 NOTE — PROGRESS NOTES
[facility] Care Coordination Contact    [facility] Home Visit Assessment     Telephone visit for Health Risk Assessment with aBn Petersen completed on July 20, 2021 due to Covid-19 restrictions.     Type of residence:: Private home - no stairs. There is a ramp going into her home.   Current living arrangement:: I live in a private home with family     Assessment completed with:: Family, member and her guardian     Current Care Plan  Member currently receiving the following home care services: None    Member currently receiving the following community resources: PCA, DME      Medication Review  Medication reconciliation completed in Epic: If no, please explain. Done by phone  Medication set-up & administration: Family/informal caregiver sets up weekly.  Family caregiver administers medications.  Medication Risk Assessment Medication (1 or more, place referral to MTM): Recent falls within past year  MTM Referral Placed: No: Member and family decline    Mental/Behavioral Health   Depression Screening:   PHQ-2 Total Score (Adult) - Positive if 3 or more points; Administer PHQ-9 if positive: 0       Mental health DX:: No        Falls Assessment:   Fallen 2 or more times in the past year?: Yes   Any fall with injury in the past year?: No    ADL/IADL Dependencies:   Dependent ADLs:: Bathing, Dressing, Eating, Grooming, Incontinence, Positioning, Transfers, Wheelchair-with assist, Toileting  Dependent IADLs:: Cleaning, Cooking, Laundry, Shopping, Meal Preparation, Medication Management, Money Management, Transportation, Incontinence    [facility] Plan sponsored benefits: Shared information re: Silver Sneakers/gym memberships, ASA, Calcium +D.    PCA Assessment completed at visit: Yes Annual PCA assessment indicated 46 units per day of PCA. This is the same as the previous assessment.     Elderly Waiver Eligibility: Yes-will continue on EW    Care Plan & Recommendations: Continue PCA services provided by  her family. Also continue medical supplies as needed.     See [facility] for detailed assessment information.    Follow-Up Plan: Member informed of future contact, plan to f/u with member with a 6 month telephone assessment.  Contact information shared with member and family, encouraged member to call with any questions or concerns at any time.    [facility] continuum providers: Please refer to Health Care Home on the Epic Problem List to view this patient's [facility] Care Plan Summary.  Hillary Romano RN  [facility] Care Coordinator  [phone]

## 2021-07-28 ENCOUNTER — PATIENT OUTREACH (OUTPATIENT)
Dept: GERIATRIC MEDICINE | Facility: CLINIC | Age: 76
End: 2021-07-28

## 2021-07-28 NOTE — PROGRESS NOTES
[facility] Care Coordination Contact    Received after visit chart from care coordinator.  Completed following tasks: Mailed copy of care plan to client, Updated services in access, Submitted referrals/auths for DME & supplies and mailed POC & PCS sig sheet w/SASE  , Provider Signature - No POC Shared:  Member indicates that they do not want their POC shared with any EW providers.    [facility]:  Emailed completed PCA assessment to [facility].  Faxed copy of PCA assessment to PCA Agency and mailed copy to member.  Faxed MD Communication to PCP.     Melita Lange  Case Management Specialist  [facility]  [phone]

## 2021-07-28 NOTE — LETTER
July 28, 2021      ETIENNE GREWAL  [address] [place] [id number]      Dear Etienne:    At [facility], we are dedicated to improving your health and well-being. Enclosed is the Comprehensive Care Plan that we developed with you on 7/20/2021. Please review the Care Plan carefully.    As a reminder, some of the things we discussed at your visit include:    Your physical and mental health    Ways to reduce falls    Health care needs you may have    Don t forget to contact your care coordinator if you:    Have been hospitalized or plan to be hospitalized     Have had a fall     Have experienced a change in physical health    Are experiencing emotional problems     If you do not agree with your Care Plan, have questions about it, or have experienced a change in your needs, please call me at [phone]. If you are hearing impaired, please call the [place] Relay at 317 or [phone] (eixqoj-il-rggzur relay service).    Sincerely,    Hillary Romano RN, MA    E-mail: [email or web address]  Phone: [phone]      [facility] ([facility]) is a health plan that contracts with both Medicare and the [place] Medical Assistance (Medicaid) program to provide benefits of both programs to enrollees. Enrollment in [facility] depends on contract renewal.    [id number])     [id number] (11/18)

## 2021-08-09 ENCOUNTER — TELEPHONE (OUTPATIENT)
Dept: FAMILY MEDICINE | Facility: CLINIC | Age: 76
End: 2021-08-09

## 2021-09-03 DIAGNOSIS — M25.551 PAIN OF RIGHT HIP JOINT: ICD-10-CM

## 2021-09-03 DIAGNOSIS — G40.909 SEIZURE DISORDER (H): ICD-10-CM

## 2021-09-03 DIAGNOSIS — G62.9 PERIPHERAL POLYNEUROPATHY: ICD-10-CM

## 2021-09-03 DIAGNOSIS — G89.4 CHRONIC PAIN SYNDROME: ICD-10-CM

## 2021-09-03 DIAGNOSIS — F11.90 CHRONIC, CONTINUOUS USE OF OPIOIDS: ICD-10-CM

## 2021-09-03 NOTE — TELEPHONE ENCOUNTER
Reason for Call:  Other prescription    Detailed comments: Pt needs refill on acetaminophen-codeine (TYLENOL #3) 300-30 MG tablet  phenytoin (DILANTIN) 100 MG capsule    Phone Number Patient can be reached at: Cell number on file:    Telephone Information:   Mobile [phone]       Best Time: ANYTIME    Can we leave a detailed message on this number? YES    Call taken on 9/3/2021 at 6:00 PM by Ivania Sifuentes

## 2021-09-07 RX ORDER — PHENYTOIN SODIUM 100 MG/1
CAPSULE, EXTENDED RELEASE ORAL
Qty: 180 CAPSULE | Refills: 1 | Status: SHIPPED | OUTPATIENT
Start: 2021-09-07 | End: 2022-03-14

## 2021-09-07 NOTE — TELEPHONE ENCOUNTER
JW,    Refill request for Tylenol #3 and Phenytoin:    Routing refill request to provider for review/approval because:  Drug not on the [facility] refill protocol   Anti-Seizure Meds Protocol Lfilnr5409/07/2021 08:24 AM   Review Authorizing provider's last note.  Protocol Details    Normal CBC on file in past 26 months     Dilantin level within therapeutic range in past 26 months      Sofia Rebolledo RN

## 2021-09-07 NOTE — TELEPHONE ENCOUNTER
Refills sent.  Due for follow up please schedule (call suzan her caregiver).     We had phone appointment scheduled recently but did not complete visit, call went straight to voicemail several times.  Please confirm with her the phone number to call and to advise to make sure not set to go straight to voicemail.     Joel Wegener,MD

## 2021-09-09 ENCOUNTER — VIRTUAL VISIT (OUTPATIENT)
Dept: FAMILY MEDICINE | Facility: CLINIC | Age: 76
End: 2021-09-09
Payer: COMMERCIAL

## 2021-09-09 DIAGNOSIS — Z13.6 CARDIOVASCULAR SCREENING; LDL GOAL LESS THAN 160: ICD-10-CM

## 2021-09-09 DIAGNOSIS — Z79.899 MEDICATION MANAGEMENT: ICD-10-CM

## 2021-09-09 DIAGNOSIS — G62.9 PERIPHERAL POLYNEUROPATHY: ICD-10-CM

## 2021-09-09 DIAGNOSIS — F11.90 CHRONIC, CONTINUOUS USE OF OPIOIDS: ICD-10-CM

## 2021-09-09 DIAGNOSIS — G89.4 CHRONIC PAIN SYNDROME: ICD-10-CM

## 2021-09-09 DIAGNOSIS — M25.551 PAIN OF RIGHT HIP JOINT: ICD-10-CM

## 2021-09-09 DIAGNOSIS — G81.01 FLACCID HEMIPLEGIA OF RIGHT DOMINANT SIDE DUE TO NONCEREBROVASCULAR ETIOLOGY (H): ICD-10-CM

## 2021-09-09 DIAGNOSIS — H35.3220 EXUDATIVE AGE-RELATED MACULAR DEGENERATION OF LEFT EYE, UNSPECIFIED STAGE (H): ICD-10-CM

## 2021-09-09 DIAGNOSIS — F09 COGNITIVE DYSFUNCTION: ICD-10-CM

## 2021-09-09 DIAGNOSIS — Z23 NEED FOR VIRAL IMMUNIZATION: Primary | ICD-10-CM

## 2021-09-09 DIAGNOSIS — G40.822 NONINTRACTABLE EPILEPTIC SPASMS WITHOUT STATUS EPILEPTICUS (H): ICD-10-CM

## 2021-09-09 PROCEDURE — 99442 PR PHYSICIAN TELEPHONE EVALUATION 11-20 MIN: CPT | Mod: 95 | Performed by: FAMILY MEDICINE

## 2021-09-09 RX ORDER — GABAPENTIN 400 MG/1
400 CAPSULE ORAL 2 TIMES DAILY
Qty: 180 CAPSULE | Refills: 3 | Status: SHIPPED | OUTPATIENT
Start: 2021-09-09 | End: 2022-02-20

## 2021-09-09 NOTE — PROGRESS NOTES
"Wen is a 76 year old who is being evaluated via a billable telephone visit.      What phone number would you like to be contacted at? [phone]  How would you like to obtain your AVS? Mail a copy    Assessment & Plan     Peripheral polyneuropathy    Visit with sandra her caregiver today with wen listening.   Ok to increase to four times daily.     Visit with Sandra hsu's caregiver today (mental delay due to stroke/seizures)      Noticing increased \"grinding, \"raw/raw\" pain\" that wen notes right side which she does see improves with extragabapentin.  Has been giving three times daily for 10 days now.  No sedation from this.     Wen does not understand need to wear mask and will not due so so very hesitant to bring to clinic for evaluations/labs, etc.  Sandra reports otherwise wen doing quite well, seems happy, content.     I will see if community paramedic could make a one time visit to review medications, give flu shot and lab draw, orders placed today due for cmp, lipids, phenytoin level. Absolutely no concerns about abuse or diversion so utox not done.     - gabapentin (NEURONTIN) 400 MG capsule  Dispense: 180 capsule; Refill: 3    Chronic pain syndrome  Will continue t#3 refills.      Mail contract to review/sign/return.     Pain of right hip joint  Reason for opiod therapy.     Chronic, continuous use of opioids    5. Need for viral immunization    - INFLUENZA, QUAD, HD, PF, 65+ (FLUZONE HD); Future  - Community Paramedic Referral; Future    6. Medication management    - Comprehensive metabolic panel (BMP + Alb, Alk Phos, ALT, AST, Total. Bili, TP); Future  - CBC with platelets; Future  - Phenytoin level; Future  - Community Paramedic Referral; Future    7. CARDIOVASCULAR SCREENING; LDL GOAL LESS THAN 160    - Lipid panel reflex to direct LDL Fasting; Future  - Community Paramedic Referral; Future    8. 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 Cognitive dysfunction    - Community Paramedic Referral; Future    11. Mac degeneration:  Follow up with eye doctor due reviewed                         No follow-ups on file.    Joel Daniel Wegener, MD  [facility] CenterNAZARIO Carver is a 76 year old who presents for the following health issues  accompanied by her PCA Sandra:    HPI       Medication Followup of gabapentin 400 mg    Taking Medication as prescribed: yes    Side Effects:  None    Medication Helping Symptoms:  NO- PCA states pt is complaining about worsened neuropathy / nerve pain, would like to discuss increasing gabapentin to 4 times daily        Pain History:  When did you first notice your pain? - More than 6 weeks   Have you seen this provider for your pain in the past?   Yes   Where in your body do you have pain? Gabapentin 400 mg, tylenol #3  Are you seeing anyone else for your pain? No      PHQ-9 SCORE 9/26/2017   PHQ-9 Total Score 0     No flowsheet data found.    PEG Score 9/9/2021   PEG Total Score 7       Chronic Pain Follow Up:    Location of pain: hip, back, legs.   Analgesia/pain control:    - Recent changes:  Increased suspected neuropathic pain right leg.     - Overall control: Tolerable with discomfort    - Current treatments: t#3, neurontin.    Adherence:     - Do you ever take more pain medicine than prescribed? No    - When did you take your last dose of pain medicine?  Today.    Adverse effects: No   PDMP Review       Value Time User    State PDMP site checked  Yes 9/9/2021  5:58 PM Wegener, Joel Daniel Irwin, MD        Last CSA Agreement:   Patient-Level CSA:    Controlled Substance Agreement - Opioid - Scan on 5/30/2019 11:20 AM        See plan for additional history.     Last UDS:             Review of Systems         Objective           Vitals:  No vitals were  obtained today due to virtual visit.    Physical Exam     Dysarthric speech.             Phone call duration: 11 minutes

## 2021-09-09 NOTE — LETTER
Opioid / Opioid Plus Controlled Substance Agreement    This is an agreement between you and your provider about the safe and appropriate use of controlled substance/opioids prescribed by your care team. Controlled substances are medicines that can cause physical and mental dependence (abuse).    There are strict laws about having and using these medicines. We here at [facility] are committing to working with you in your efforts to get better. To support you in this work, we ll help you schedule regular office appointments for medicine refills. If we must cancel or change your appointment for any reason, we ll make sure you have enough medicine to last until your next appointment.     As a Provider, I will:    Listen carefully to your concerns and treat you with respect.     Recommend a treatment plan that I believe is in your best interest. This plan may involve therapies other than opioid pain medication.     Talk with you often about the possible benefits, and the risk of harm of any medicine that we prescribe for you.     Provide a plan on how to taper (discontinue or go off) using this medicine if the decision is made to stop its use.    As a Patient, I understand that opioid(s):     Are a controlled substance prescribed by my care team to help me function or work and manage my condition(s).     Are strong medicines and can cause serious side effects such as:    Drowsiness, which can seriously affect my driving ability    A lower breathing rate, enough to cause death    Harm to my thinking ability     Depression     Abuse of and addiction to this medicine    Need to be taken exactly as prescribed. Combining opioids with certain medicines or chemicals (such as illegal drugs, sedatives, sleeping pills, and benzodiazepines) can be dangerous or even fatal. If I stop opioids suddenly, I may have severe withdrawal symptoms.    Do not work for all types of pain nor for all patients. If they re not helpful, I may  be asked to stop them.        The risks, benefits and side effects of these medicine(s) were explained to me. I agree that:  1. I will take part in other treatments as advised by my care team. This may be psychiatry or counseling, physical therapy, behavioral therapy, group treatment or a referral to a specialist.     2. I will keep all my appointments. I understand that this is part of the monitoring of opioids. My care team may require an office visit for EVERY opioid/controlled substance refill. If I miss appointments or don t follow instructions, my care team may stop my medicine.    3. I will take my medicines as prescribed. I will not change the dose or schedule unless my care team tells me to. There will be no refills if I run out early.     4. I may be asked to come to the clinic and complete a urine drug test or complete a pill count at any time. If I don t give a urine sample or participate in a pill count, the care team may stop my medicine.    5. I will only receive prescriptions from this clinic for chronic pain. If I am treated by another provider for acute pain issues, I will tell them that I am taking opioid pain medication for chronic pain and that I have a treatment agreement with this provider. I will inform my [facility] care team within one business day if I am given a prescription for any pain medication by another healthcare provider. My [facility] care team can contact other providers and pharmacists about my use of any medicines.    6. It is up to me to make sure that I don t run out of my medicines on weekends or holidays. If my care team is willing to refill my opioid prescription without a visit, I must request refills only during office hours. Refills may take up to 3 business days to process. I will use one pharmacy to fill all my opioid and other controlled substance prescriptions. I will notify the clinic about any changes to my insurance or medication  availability.    7. I am responsible for my prescriptions. If the medicine/prescription is lost, stolen or destroyed, it will not be replaced. I also agree not to share controlled substance medicines with anyone.    8. I am aware I should not use any illegal or recreational drugs. I agree not to drink alcohol unless my care team says I can.       9. If I enroll in the [place] [facility] Cannabis program, I will tell my care team prior to my next refill.     10. I will tell my care team right away if I become pregnant, have a new medical problem treated outside of my regular clinic, or have a change in my medications.    11. I understand that this medicine can affect my thinking, judgment and reaction time. Alcohol and drugs affect the brain and body, which can affect the safety of my driving. Being under the influence of alcohol or drugs can affect my decision-making, behaviors, personal safety, and the safety of others. Driving while impaired (DWI) can occur if a person is driving, operating, or in physical control of a car, motorcycle, boat, snowmobile, ATV, motorbike, off-road vehicle, or any other motor vehicle ([place] Statute 169A.20). I understand the risk if I choose to drive or operate any vehicle or machinery.    I understand that if I do not follow any of the conditions above, my prescriptions or treatment may be stopped or changed.          Opioids  What You Need to Know    What are opioids?   Opioids are pain medicines that must be prescribed by a doctor. They are also known as narcotics.     Examples are:   1. morphine (MS Contin, Debra)  2. oxycodone (Oxycontin)  3. oxycodone and acetaminophen (Percocet)  4. hydrocodone and acetaminophen (Vicodin, Norco)   5. fentanyl patch (Duragesic)   6. hydromorphone (Dilaudid)   7. methadone  8. codeine (Tylenol #3)     What do opioids do well?   Opioids are best for severe short-term pain such as after a surgery or injury. They may work well for cancer pain. They may  help some people with long-lasting (chronic) pain.     What do opioids NOT do well?   Opioids never get rid of pain entirely, and they don t work well for most patients with chronic pain. Opioids don t reduce swelling, one of the causes of pain.                                    Other ways to manage chronic pain and improve function include:       Treat the health problem that may be causing pain    Anti-inflammation medicines, which reduce swelling and tenderness, such as ibuprofen (Advil, Motrin) or naproxen (Aleve)    Acetaminophen (Tylenol)    Antidepressants and anti-seizure medicines, especially for nerve pain    Topical treatments such as patches or creams    Injections or nerve blocks    Chiropractic or osteopathic treatment    Acupuncture, massage, deep breathing, meditation, visual imagery, aromatherapy    Use heat or ice at the pain site    Physical therapy     Exercise    Stop smoking    Take part in therapy       Risks and side effects     Talk to your doctor before you start or decide to keep taking opioids. Possible side effects include:      Lowering your breathing rate enough to cause death    Overdose, including death, especially if taking higher than prescribed doses    Worse depression symptoms; less pleasure in things you usually enjoy    Feeling tired or sluggish    Slower thoughts or cloudy thinking    Being more sensitive to pain over time; pain is harder to control    Trouble sleeping or restless sleep    Changes in hormone levels (for example, less testosterone)    Changes in sex drive or ability to have sex    Constipation    Unsafe driving    Itching and sweating    Dizziness    Nausea, throwing up and dry mouth    What else should I know about opioids?    Opioids may lead to dependence, tolerance, or addiction.      Dependence means that if you stop or reduce the medicine too quickly, you will have withdrawal symptoms. These include loose poop (diarrhea), jitters, flu-like symptoms,  nervousness and tremors. Dependence is not the same as addiction.                       Tolerance means needing higher doses over time to get the same effect. This may increase the chance of serious side effects.      Addiction is when people improperly use a substance that harms their body, their mind or their relations with others. Use of opiates can cause a relapse of addiction if you have a history of drug or alcohol abuse.      People who have used opioids for a long time may have a lower quality of life, worse depression, higher levels of pain and more visits to doctors.    You can overdose on opioids. Take these steps to lower your risk of overdose:    1. Recognize the signs:  Signs of overdose include decrease or loss of consciousness (blackout), slowed breathing, trouble waking up and blue lips. If someone is worried about overdose, they should call 911.    2. Talk to your doctor about Narcan (naloxone).   If you are at risk for overdose, you may be given a prescription for Narcan. This medicine very quickly reverses the effects of opioids.   If you overdose, a friend or family member can give you Narcan while waiting for the ambulance. They need to know the signs of overdose and how to give Narcan.     3. Don't use alcohol or street drugs.   Taking them with opioids can cause death.    4. Do not take any of these medicines unless your doctor says it s OK. Taking these with opioids can cause death:    Benzodiazepines, such as lorazepam (Ativan), alprazolam (Xanax) or diazepam (Valium)    Muscle relaxers, such as cyclobenzaprine (Flexeril)    Sleeping pills like zolpidem (Ambien)     Other opioids      How to keep you and other people safe while taking opioids:    1. Never share your opioids with others.  Opioid medicines are regulated by the Drug Enforcement Agency ([employer]). Selling or sharing medications is a criminal act.    2. Be sure to store opioids in a secure place, locked up if possible. Young children  can easily swallow them and overdose.    3. When you are traveling with your medicines, keep them in the original bottles. If you use a pill box, be sure you also carry a copy of your medicine list from your clinic or pharmacy.    4. Safe disposal of opioids    Most pharmacies have places to get rid of medicine, called disposal kiosks. Medicine disposal options are also available in every [facility]. Search your county and  medication disposal  to find more options. You can find more details at:  [email or web address]     I agree that my provider, clinic care team, and pharmacy may work with any city, state or federal law enforcement agency that investigates the misuse, sale, or other diversion of my controlled medicine. I will allow my provider to discuss my care with, or share a copy of, this agreement with any other treating provider, pharmacy or emergency room where I receive care.    I have read this agreement and have asked questions about anything I did not understand.    _______________________________________________________  Patient Signature - Ban Petersen _____________________                   Date     _______________________________________________________  Provider Signature - Joel Daniel Wegener, MD   _____________________                   Date     _______________________________________________________  Witness Signature (required if provider not present while patient signing)   _____________________                   Date

## 2021-09-09 NOTE — TELEPHONE ENCOUNTER
Looks like patient called in to clinic on schedule with DANETTE today 9/9/21.    Thanks  Sofia GRAY

## 2021-09-13 ENCOUNTER — PATIENT OUTREACH (OUTPATIENT)
Dept: OTHER | Facility: CLINIC | Age: 76
End: 2021-09-13

## 2021-09-14 ENCOUNTER — PATIENT OUTREACH (OUTPATIENT)
Dept: GERIATRIC MEDICINE | Facility: CLINIC | Age: 76
End: 2021-09-14

## 2021-09-14 NOTE — PROGRESS NOTES
Per [facility], CC notified.  Ban Petersen  1945 [employer] & [employer]  7/27/2021 DELIVERED 9/2/21     Melita Lange  Case Management Specialist  [facility]  [phone]

## 2021-09-14 NOTE — PROGRESS NOTES
CC received notification of Emergency Room visit.  ER visit occurred on 9/12/21 at [facility] with Dx of gagging.    CC contacted Sandra, PCA/niece and reviewed discharge summary. Sandra reports that Wen is doing well and has no further issue from the gagging episode.  Member has a follow-up appointment with PCP: No: family does not feel it is needed at this time.   Member has had a change in condition: No  New referrals placed: No  Home Visit Needed: No  Care plan reviewed and updated.  PCP notified of ED visit via EMR.  JESSI Zamarripa  [facility]   [phone] (Cell)  [phone] (Office)  [phone] (fax)  [email or web address]

## 2021-09-20 ENCOUNTER — LAB (OUTPATIENT)
Dept: LAB | Facility: CLINIC | Age: 76
End: 2021-09-20
Payer: COMMERCIAL

## 2021-09-20 ENCOUNTER — ALLIED HEALTH/NURSE VISIT (OUTPATIENT)
Dept: OTHER | Facility: CLINIC | Age: 76
End: 2021-09-20
Attending: FAMILY MEDICINE
Payer: COMMERCIAL

## 2021-09-20 VITALS
OXYGEN SATURATION: 94 % | SYSTOLIC BLOOD PRESSURE: 108 MMHG | TEMPERATURE: 98.1 F | HEART RATE: 77 BPM | DIASTOLIC BLOOD PRESSURE: 58 MMHG

## 2021-09-20 DIAGNOSIS — Z79.899 MEDICATION MANAGEMENT: ICD-10-CM

## 2021-09-20 DIAGNOSIS — G89.4 CHRONIC PAIN SYNDROME: ICD-10-CM

## 2021-09-20 DIAGNOSIS — G40.822 NONINTRACTABLE EPILEPTIC SPASMS WITHOUT STATUS EPILEPTICUS (H): ICD-10-CM

## 2021-09-20 DIAGNOSIS — F09 COGNITIVE DYSFUNCTION: ICD-10-CM

## 2021-09-20 DIAGNOSIS — F11.90 CHRONIC, CONTINUOUS USE OF OPIOIDS: ICD-10-CM

## 2021-09-20 DIAGNOSIS — G81.01 FLACCID HEMIPLEGIA OF RIGHT DOMINANT SIDE DUE TO NONCEREBROVASCULAR ETIOLOGY (H): ICD-10-CM

## 2021-09-20 DIAGNOSIS — Z13.6 CARDIOVASCULAR SCREENING; LDL GOAL LESS THAN 160: ICD-10-CM

## 2021-09-20 DIAGNOSIS — Z23 NEED FOR VIRAL IMMUNIZATION: ICD-10-CM

## 2021-09-20 DIAGNOSIS — M25.551 PAIN OF RIGHT HIP JOINT: ICD-10-CM

## 2021-09-20 DIAGNOSIS — G62.9 PERIPHERAL POLYNEUROPATHY: ICD-10-CM

## 2021-09-20 LAB
ALBUMIN SERPL-MCNC: 3.6 G/DL (ref 3.4–5)
ALP SERPL-CCNC: 109 U/L (ref 40–150)
ALT SERPL W P-5'-P-CCNC: 28 U/L (ref 0–50)
ANION GAP SERPL CALCULATED.3IONS-SCNC: 6 MMOL/L (ref 3–14)
AST SERPL W P-5'-P-CCNC: 20 U/L (ref 0–45)
BILIRUB SERPL-MCNC: 0.4 MG/DL (ref 0.2–1.3)
BUN SERPL-MCNC: 13 MG/DL (ref 7–30)
CALCIUM SERPL-MCNC: 8.8 MG/DL (ref 8.5–10.1)
CHLORIDE BLD-SCNC: 108 MMOL/L (ref 94–109)
CHOLEST SERPL-MCNC: 218 MG/DL
CO2 SERPL-SCNC: 29 MMOL/L (ref 20–32)
CREAT SERPL-MCNC: 0.71 MG/DL (ref 0.52–1.04)
FASTING STATUS PATIENT QL REPORTED: ABNORMAL
GFR SERPL CREATININE-BSD FRML MDRD: 84 ML/MIN/1.73M2
GLUCOSE BLD-MCNC: 86 MG/DL (ref 70–99)
HDLC SERPL-MCNC: 68 MG/DL
HOLD SPECIMEN: NORMAL
LDLC SERPL CALC-MCNC: 117 MG/DL
NONHDLC SERPL-MCNC: 150 MG/DL
PHENYTOIN SERPL-MCNC: 9.3 MG/L
POTASSIUM BLD-SCNC: 4.7 MMOL/L (ref 3.4–5.3)
PROT SERPL-MCNC: 6.6 G/DL (ref 6.8–8.8)
SODIUM SERPL-SCNC: 143 MMOL/L (ref 133–144)
TRIGL SERPL-MCNC: 167 MG/DL

## 2021-09-20 PROCEDURE — 36415 COLL VENOUS BLD VENIPUNCTURE: CPT

## 2021-09-20 PROCEDURE — 80061 LIPID PANEL: CPT

## 2021-09-20 PROCEDURE — 82040 ASSAY OF SERUM ALBUMIN: CPT

## 2021-09-20 PROCEDURE — G0008 ADMIN INFLUENZA VIRUS VAC: HCPCS

## 2021-09-20 PROCEDURE — 90662 IIV NO PRSV INCREASED AG IM: CPT

## 2021-09-20 PROCEDURE — 80185 ASSAY OF PHENYTOIN TOTAL: CPT

## 2021-09-20 PROCEDURE — 99207 PR COMMUNITY PARAMEDIC-PATIENT MEETS CRITERIA: CPT

## 2021-09-20 NOTE — LETTER
"October 4, 2021      Ban Petersen  [address] [place] [id number]        Dear Ban Petersen     I have reviewed your lab and radiology reports and no further evaluation or medication changes are needed unless your atherosclerotic heart cardiovascular disease risk score (ASCVD- based on cholesterol) was not in a good range - please see that below.       I have provided information about common lab tests.     If you have questions please send me a [employer] message, telephone call or follow up with a virtual visit.     Best,     Joel Wegener,MD   --------------------------------------------------   LAB REFERENCE INFORMATION       If we checked your cholesterol your \"heart attack\"  risk score is below this is also called ASCVD or atherosclerotic cardiovascular disease risk score.   The goal is to be under10% for people who have had  heart attacks, strokes,  diabetes or vascular disease. For others the goal is to at least be below 20% .     The 10-year ASCVD risk score (Jim FRYE Jr., et al., 2013) is: 11.9%     Values used to calculate the score:       Age: 76 years       Sex: Female       Is Non- : No       Diabetic: No       Tobacco smoker: No       Systolic Blood Pressure: 108 mmHg       Is BP treated: No       HDL Cholesterol: 68 mg/dL       Total Cholesterol: 218 mg/dL       CMP/BMP (metabolic panels with electrolyte levels,  kidney and liver function).  If these results require medication changes I would let you know.     MICROALBUMIN - urine protein test that also looks for kidney damage.     GLUCOSE (included in metabolic panels above) - Fasting levels above 100 are in the pre-diabetic range.  Levels above 124 are in the diabetic range.         TSH with T4  - (thyroid hormones).  If you labs were not normal and you do not know the plan for this please contact me.     Hemoglobin A1c - \"Diabetes control test\" - goal for most patients to be below 7.       Vitamin D - " summer sun needed to get enough.  Low levels related to fatigue and loss of bone strength.  If your level is low I recommend taking 1, 000 to 3, 000 units daily over the winter.  If it is normal you can take 400 units daily.     CBC (complete blood counts).  Screen for anemia, bleeding disorders, leukemia.  If significant abnormal or unstable results I will let you know.     PSA (Prostate Specific Antigen) - If this is elevated it can be a sign of prostate cancer or also an enlarged prostate. If yours is high you should see a urologist to look into it further.       Resulted Orders   Phenytoin level   Result Value Ref Range    Phenytoin 9.3   mg/L      Comment:      Therapeutic Range: 10.0-20.0 mg/L  Critical: Greater than 30.0 mg/L   Comprehensive metabolic panel (BMP + Alb, Alk Phos, ALT, AST, Total. Bili, TP)   Result Value Ref Range    Sodium 143 133 - 144 mmol/L    Potassium 4.7 3.4 - 5.3 mmol/L    Chloride 108 94 - 109 mmol/L    Carbon Dioxide (CO2) 29 20 - 32 mmol/L    Anion Gap 6 3 - 14 mmol/L    Urea Nitrogen 13 7 - 30 mg/dL    Creatinine 0.71 0.52 - 1.04 mg/dL    Calcium 8.8 8.5 - 10.1 mg/dL    Glucose 86 70 - 99 mg/dL    Alkaline Phosphatase 109 40 - 150 U/L    AST 20 0 - 45 U/L    ALT 28 0 - 50 U/L    Protein Total 6.6 (L) 6.8 - 8.8 g/dL    Albumin 3.6 3.4 - 5.0 g/dL    Bilirubin Total 0.4 0.2 - 1.3 mg/dL    GFR Estimate 84 >60 mL/min/1.73m2      Comment:      As of July 11, 2021, eGFR is calculated by the CKD-EPI creatinine equation, without race adjustment. eGFR can be influenced by muscle mass, exercise, and diet. The reported eGFR is an estimation only and is only applicable if the renal function is stable.   Lipid panel reflex to direct LDL Fasting   Result Value Ref Range    Cholesterol 218 (H) <200 mg/dL    Triglycerides 167 (H) <150 mg/dL    Direct Measure HDL 68 >=50 mg/dL    LDL Cholesterol Calculated 117 (H) <=100 mg/dL    Non HDL Cholesterol 150 (H) <130 mg/dL    Patient Fasting > 8hrs?  Unknown     Narrative    Cholesterol  Desirable:  <200 mg/dL    Triglycerides  Normal:  Less than 150 mg/dL  Borderline High:  150-199 mg/dL  High:  200-499 mg/dL  Very High:  Greater than or equal to 500 mg/dL    Direct Measure HDL  Female:  Greater than or equal to 50 mg/dL   Male:  Greater than or equal to 40 mg/dL    LDL Cholesterol  Desirable:  <100mg/dL  Above Desirable:  100-129 mg/dL   Borderline High:  130-159 mg/dL   High:  160-189 mg/dL   Very High:  >= 190 mg/dL    Non HDL Cholesterol  Desirable:  130 mg/dL  Above Desirable:  130-159 mg/dL  Borderline High:  160-189 mg/dL  High:  190-219 mg/dL  Very High:  Greater than or equal to 220 mg/dL

## 2021-09-23 NOTE — PROGRESS NOTES
Community Paramedics Follow-up Visit  September 20, 2021  TIME: 1500    Ban Petersen is a 76 year old female being seen at home for a follow-up visit.    Present at appointment:  Patient, cliff         Chief Complaint   Patient presents with     Flu Shot     Community Paramedic visit       Universal Utilization:      Utilization    Hospital Admissions  0             ED Visits  0             No Show Count (past year)  2                Current as of: 9/21/2021  6:14 PM              /58   Pulse 77   Temp 98.1  F (36.7  C)   SpO2 94%     Clinical Concerns:  Current Medical Concerns:  Need for flu shot and blood draw.    Current Behavioral Concerns: none    Education Provided to patient: yes, need to drink water before next blood draw.   Medication set up? No  Pill Box issued: No  Scale issued: No  Flu Shot given: Yes  Lab draw or specimen collection: Yes  Food box issued: No  Collaborative visit with PCP: No    Health Maintenance Reviewed:      Clinical Pathway: None    No  Face to Face in Home / Community      Review of Symptoms/PE    Skin: negative  Eyes: negative  Ears/Nose/Throat: negative  Respiratory: No shortness of breath, dyspnea on exertion, cough, or hemoptysis  Cardiovascular: negative  Gastrointestinal: negative  Genitourinary: negative  Musculoskeletal: muscular weakness  Neurologic: paralysis  Psychiatric: negative    Pain Management::                 Plan:     Time spent with patient: 45    The patient meets one or more of the following criteria:  * Requires services to prevent readmission to a nursing home or hospital    Acute concern/Follow-up recommendations: Continue care plan.    Next CP visit scheduled: 10/5/21    Issues for Provider to follow up on: Flu shot was administered.  I was only able to get enough blood for 3 out of 4 labs required.  She was encouraged to drink more water before our next appointment to finish the lab request.  Both her niece and patient agreed to make sure  to hydrate before the next appointment.    Provider follow up visit needed: TBSIMON

## 2021-12-07 ENCOUNTER — TELEPHONE (OUTPATIENT)
Dept: FAMILY MEDICINE | Facility: CLINIC | Age: 76
End: 2021-12-07
Payer: COMMERCIAL

## 2021-12-07 NOTE — TELEPHONE ENCOUNTER
Reason for Call:  Form, our goal is to have forms completed with 72 hours, however, some forms may require a visit or additional information.    Type of letter, form or note:    Page 5 of the Opioid/Opioid Plus controlled substance agreement was mailed to the [facility].    Who is the form from?:   Patient    Where did the form come from: form was mailed in    What clinic location was the form placed at?:   [facility]    Where the form was placed:   Dr. Wegener's desk.    What number is listed as a contact on the form?:   n/a       Additional comments: n/a    Call taken on 12/7/2021 at 11:35 AM by Janay Syed

## 2022-01-20 ENCOUNTER — PATIENT OUTREACH (OUTPATIENT)
Dept: GERIATRIC MEDICINE | Facility: CLINIC | Age: 77
End: 2022-01-20
Payer: COMMERCIAL

## 2022-01-20 NOTE — PROGRESS NOTES
[facility] Care Coordination Contact      [facility] Six-Month Telephone Assessment    6 month telephone assessment completed on 01/20/22.    ER visits: Yes -  [facility] after a choking episode and shortness of breath  Hospitalizations: No  TCU stays: No  Significant health status changes: Member's niece states member is getting more forgetful.  Falls/Injuries: No  ADL/IADL changes: No  Changes in services: No    Caregiver Assessment follow up:  NA    Goals: See POC in chart for goal progress documentation.      Will see member in 6 months for an annual health risk assessment.   Encouraged member to call CC with any questions or concerns in the meantime.   Hillary Romano RN  [facility] Care Coordinator  [phone]

## 2022-02-10 ENCOUNTER — IMMUNIZATION (OUTPATIENT)
Dept: NURSING | Facility: CLINIC | Age: 77
End: 2022-02-10
Payer: COMMERCIAL

## 2022-02-10 DIAGNOSIS — Z23 HIGH PRIORITY FOR 2019-NCOV VACCINE: Primary | ICD-10-CM

## 2022-02-10 PROBLEM — R13.10 DYSPHAGIA, UNSPECIFIED: Status: ACTIVE | Noted: 2017-09-26

## 2022-02-10 PROBLEM — Z91.81 HISTORY OF FALLING: Status: ACTIVE | Noted: 2018-06-01

## 2022-02-10 PROBLEM — G62.9 POLYNEUROPATHY, UNSPECIFIED: Status: ACTIVE | Noted: 2020-02-10

## 2022-02-10 PROBLEM — S06.9X9S UNSPECIFIED INTRACRANIAL INJURY WITH LOSS OF CONSCIOUSNESS OF UNSPECIFIED DURATION, SEQUELA (H): Status: ACTIVE | Noted: 2020-02-10

## 2022-02-10 PROBLEM — R47.01 APHASIA: Status: ACTIVE | Noted: 2017-09-26

## 2022-02-10 PROBLEM — G81.93: Status: ACTIVE | Noted: 2018-06-01

## 2022-02-10 PROBLEM — I10 ESSENTIAL (PRIMARY) HYPERTENSION: Status: ACTIVE | Noted: 2020-02-10

## 2022-02-10 PROBLEM — F02.80 DEMENTIA IN OTHER DISEASES CLASSIFIED ELSEWHERE WITHOUT BEHAVIORAL DISTURBANCE: Status: ACTIVE | Noted: 2020-02-10

## 2022-02-10 PROBLEM — R41.89 OTHER SYMPTOMS AND SIGNS INVOLVING COGNITIVE FUNCTIONS AND AWARENESS: Status: ACTIVE | Noted: 2018-06-01

## 2022-02-10 PROBLEM — G83.9: Status: ACTIVE | Noted: 2018-06-01

## 2022-02-10 PROBLEM — Z87.820 PERSONAL HISTORY OF TRAUMATIC BRAIN INJURY: Status: ACTIVE | Noted: 2018-06-01

## 2022-02-10 PROCEDURE — 91305 COVID-19,PF,PFIZER (12+ YRS): CPT

## 2022-02-10 PROCEDURE — 0054A COVID-19,PF,PFIZER (12+ YRS): CPT

## 2022-02-18 ENCOUNTER — VIRTUAL VISIT (OUTPATIENT)
Dept: FAMILY MEDICINE | Facility: CLINIC | Age: 77
End: 2022-02-18
Payer: COMMERCIAL

## 2022-02-18 DIAGNOSIS — F11.90 CHRONIC, CONTINUOUS USE OF OPIOIDS: ICD-10-CM

## 2022-02-18 DIAGNOSIS — S06.9X9S TRAUMATIC BRAIN INJURY WITH LOSS OF CONSCIOUSNESS, SEQUELA (H): ICD-10-CM

## 2022-02-18 DIAGNOSIS — Z79.899 ENCOUNTER FOR LONG-TERM (CURRENT) USE OF MEDICATIONS: Primary | ICD-10-CM

## 2022-02-18 DIAGNOSIS — M25.551 PAIN OF RIGHT HIP JOINT: ICD-10-CM

## 2022-02-18 DIAGNOSIS — F02.80 DEMENTIA IN OTHER DISEASES CLASSIFIED ELSEWHERE WITHOUT BEHAVIORAL DISTURBANCE: ICD-10-CM

## 2022-02-18 DIAGNOSIS — G62.9 PERIPHERAL POLYNEUROPATHY: ICD-10-CM

## 2022-02-18 DIAGNOSIS — G81.01 FLACCID HEMIPLEGIA OF RIGHT DOMINANT SIDE DUE TO NONCEREBROVASCULAR ETIOLOGY (H): ICD-10-CM

## 2022-02-18 DIAGNOSIS — G40.822 NONINTRACTABLE EPILEPTIC SPASMS WITHOUT STATUS EPILEPTICUS (H): ICD-10-CM

## 2022-02-18 DIAGNOSIS — H35.3220 EXUDATIVE AGE-RELATED MACULAR DEGENERATION OF LEFT EYE, UNSPECIFIED STAGE (H): ICD-10-CM

## 2022-02-18 DIAGNOSIS — G89.4 CHRONIC PAIN SYNDROME: ICD-10-CM

## 2022-02-18 PROCEDURE — 99442 PR PHYSICIAN TELEPHONE EVALUATION 11-20 MIN: CPT | Mod: 95 | Performed by: FAMILY MEDICINE

## 2022-02-18 NOTE — PROGRESS NOTES
Wen is a 77 year old who is being evaluated via a billable telephone visit.      What phone number would you like to be contacted at? [phone]  How would you like to obtain your AVS? Eusebio    Assessment & Plan     Encounter for long-term (current) use of medications  4:31- 4:44    Increase gabapentin to twice daily and one extra dose as needed working very well when neuropathy/pain flairs.      Reviewed labs including normal phenytoin level within last year.     Ok to defer cbc until September given risk of covid/refuses to wear mask outside     Will call [phone] to see how to get handicap parking in front of house and let me know if paperwork needed from me.     Follow up 5-6 months.     Peripheral polyneuropathy  As above.   - REVIEW OF HEALTH MAINTENANCE PROTOCOL ORDERS  - gabapentin (NEURONTIN) 400 MG capsule; Take 1 capsule (400 mg) by mouth 2 times daily And one additional dose as needed daily  - acetaminophen-codeine (TYLENOL #3) 300-30 MG tablet; Take 1-2 tablets by mouth 3 times daily as needed for severe pain 1-2 tablets three times daily as needed1-2 tablets three times daily as needed    Chronic pain syndrome  No suspicious activity on [place] rx monitoring database     Signed contract in chart is utd although not abstracted to health maintenance, will attempt to remedy that.       - acetaminophen-codeine (TYLENOL #3) 300-30 MG tablet; Take 1-2 tablets by mouth 3 times daily as needed for severe pain 1-2 tablets three times daily as needed1-2 tablets three times daily as needed    Pain of right hip joint  Additional reason for pain.   - acetaminophen-codeine (TYLENOL #3) 300-30 MG tablet; Take 1-2 tablets by mouth 3 times daily as needed for severe pain 1-2 tablets three times daily as needed1-2 tablets three times daily as needed    Chronic, continuous use of opioids  Noted, no abuse.  Monitored by suzan her caregiver.   - acetaminophen-codeine (TYLENOL #3) 300-30 MG tablet; Take 1-2 tablets by mouth 3  "times daily as needed for severe pain 1-2 tablets three times daily as needed1-2 tablets three times daily as needed    Exudative age-related macular degeneration of left eye, unspecified stage (H)  Additional reason why sandra is needed to care for her.     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     Dementia in other diseases classified elsewhere without behavioral disturbance (H)  Also he does not understand wearing a mask so covid risk is high for her to come to clinic due to demential.     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  Continues phenytoin. Sandra wonders if labs due now .  Only lab due is cbc as above.     Traumatic brain injury with loss of consciousness, sequela (H)  Additional reason for memory loss as above.                      Return in about 7 months (around 9/13/2022) for patient will schedule, wellness/physical, cbc.    Joel Daniel Wegener, MD  [facility]    Luisa Carver is a 77 year old who presents for the following health issues  accompanied by her caregiver famly member sandra.    HPI     Pain History:  When did you first notice your pain? - More than 6 weeks   Have you seen this provider for your pain in the past?   Yes   Where in your body do you have pain? All over body , harleyece is wondering if patient can increase pt's gabapentin to PRN or 3 times a day . Respirations are 17 and pulse is 66  Are you seeing anyone else for your pain? No          Chronic Pain Follow Up:    Chronic right hip pain and neuropathy.     Signs of pain include irritability, not cooperating with cares, not smiling and also reporting \"zoom zoom zinging\" in legs. Will also note hip pain at night and/or during day with transfers.      Sandra her caregiver monitors and gives both controlled substances " includint T#3 and gabapentin.     Does note during middle of day the neuropathy pain seems to be uncontrolled.  Has occasionally given extra dose of neurontin which has been very helpful.  Wondering if could do that periodically. Estimates for good control would need 3-4 extra doses/week.       PDMP Review       Value Time User    State PDMP site checked  Yes 2/20/2022  6:57 PM Wegener, Joel Daniel Irwin, MD        Last CSA Agreement:   CSA -- Patient Level:    Controlled Substance Agreement - Opioid - Scan on 12/7/2021  2:31 PM  Controlled Substance Agreement - Opioid - Scan on 5/30/2019 11:20 AM       Last UDS:         PEG Score 9/9/2021   PEG Total Score 7         Review of Systems         Objective           Vitals:  No vitals were obtained today due to virtual visit.    Physical Exam   Wen does say hello but otherwise does not participate in conversation.                 Phone call duration: 13 minutes

## 2022-02-20 RX ORDER — GABAPENTIN 400 MG/1
400 CAPSULE ORAL 2 TIMES DAILY
Qty: 180 CAPSULE | Refills: 3 | Status: SHIPPED | OUTPATIENT
Start: 2022-02-20 | End: 2023-02-06

## 2022-04-04 ENCOUNTER — TELEPHONE (OUTPATIENT)
Dept: FAMILY MEDICINE | Facility: CLINIC | Age: 77
End: 2022-04-04
Payer: COMMERCIAL

## 2022-04-04 DIAGNOSIS — M25.552 HIP PAIN, LEFT: Primary | ICD-10-CM

## 2022-04-04 NOTE — TELEPHONE ENCOUNTER
iJnny SAMANIEGO calling from ER wanting to know if pt can be seen asap for Cortizone inj from you. Upset that message wasn't addressed yet. Apologized to her.    She is not able to come in tomorrow am as it takes 2 people to move her. She can come in around 1 or 2 or would like soonest apt after that.    She says ER wont do the injs.  Offered/suggested possible TCO and gave her locations that are open now and number to call.      Please advise.  Thanks,  Sommer Marks RN

## 2022-04-04 NOTE — TELEPHONE ENCOUNTER
JW,    Patient's niece Sandra (PCA) called.  States patient was seen in the ED yesterday for chronic left hip pain.  Sandra thinks patient should have gotten a cortisone shot but was not..  They gave her lidocaine patches and advised she continue the Tylenol #3 she takes.    Sandra wondering what else can be done for her.    Sofia Rebolledo RN

## 2022-04-05 ENCOUNTER — PATIENT OUTREACH (OUTPATIENT)
Dept: GERIATRIC MEDICINE | Facility: CLINIC | Age: 77
End: 2022-04-05
Payer: COMMERCIAL

## 2022-04-05 RX ORDER — HYDROCODONE BITARTRATE AND ACETAMINOPHEN 5; 325 MG/1; MG/1
1 TABLET ORAL EVERY 6 HOURS PRN
Qty: 10 TABLET | Refills: 0 | Status: SHIPPED | OUTPATIENT
Start: 2022-04-05 | End: 2022-04-07

## 2022-04-05 NOTE — TELEPHONE ENCOUNTER
Hydrocodone sent [facility] [address]. Do not use t#3 at same time.     Did they want visit?  See below  At this point offer the Thursday suggestions.     Joel Wegener,MD

## 2022-04-05 NOTE — TELEPHONE ENCOUNTER
[facility] Care Coordination Contact  CC received notification of 2 Emergency Room visits. ER visit occurred on 4/4/22 [place] [facility] with Dx of chronic and acute hip pain.    CC contacted brother Johnson and reviewed discharge summary.  Johnson was not aware of the follow up appt with PCP for 4/7/22 and said to call back after 3 pm to talk with the PCA if more info was needed. Member was doing well and sleeping at this time.  Member has had a change in condition: No  New referrals placed: No  Home Visit Needed: No  Care plan reviewed and updated.  PCP notified of ED visit via EMR.  Chloe Henry RN, PHN, [facility] Care Coordination  Jxuqun-[phone]. [phone]

## 2022-04-05 NOTE — TELEPHONE ENCOUNTER
I could see her late morning tomorrow 11:20, 11:40 or noon, or Thursday in my same-day. Joel Wegener,MD w

## 2022-04-05 NOTE — PROGRESS NOTES
[facility] Care Coordination Contact    Called member again and brother Johnson answered saying the PCA Sandra was at the pharmacy picking up meds for member and to call back later when the PCA is back. Explained that I did not need to talk with the PCA and that am glad member was doing well at this time.  Chloe Henry RN, PHN, [facility] Care Coordination  [phone]. [phone]

## 2022-04-05 NOTE — TELEPHONE ENCOUNTER
TC,    Please see message below.  Can you please call niece and schedule appointment?    Thank you,  Sofia Rebolledo RN

## 2022-04-05 NOTE — PROGRESS NOTES
Assessment & Plan     Trochanteric bursitis of left hip  Injection note, left trochanteric bursa: I obtained oral and written informed consent after discussion of risks (stria, infection, atrophy, elevated blood sugars) and benefits (improved pain) and alternative treatments (none, icing, nsaids, physical therapy).      I cleaned the skin over the greater trochanter with three iodine swabs.     Next, using a 25 gauge 1 1/2 inch needle I injected a mixture of 4 ml 1% lidocaine without epinepherine and 1 ml 40mg/ml kenalog from a single vial container(NDC # [address]-2) into the bursa in a sterile fashion.     The area was cleaned and a bandaid applied.     After care instructions provided:  No special precautions, activity as tolerated.         Encounter for long-term (current) use of medications      Hip pain, left  Refill given.  Per family caregiver with her today working fairly well, less crabby, sleeping better.     - HYDROcodone-acetaminophen (NORCO) 5-325 MG tablet; Take 1 tablet by mouth every 6 hours as needed for severe pain                 No follow-ups on file.    Joel Daniel Wegener, MD  [facility] CenterNAZARIO Carver is a 77 year old who presents for the following health issues     HPI     ED/UC Followup:    Facility:  [facility]   Date of visit: 04/04/2021  Reason for visit: Left hip pain   Current Status: sore   Reviewed ed notes/xrays (arthritic changes of hip)    Pain laying left side and with standing to transfer.  No trauma.           Review of Systems         Objective    There were no vitals taken for this visit.  There is no height or weight on file to calculate BMI.  Physical Exam   Fairly significant pain with palpation left lateral hip (trochanter).  No visible or palpable swelling/warmth or redness.                    Joel Wegener,MD

## 2022-04-05 NOTE — TELEPHONE ENCOUNTER
Patient is scheduled for Thursday at 3:40pm     Triage,   Spoke with niece.  Niece states patient is requesting stronger pain medication to manage her pain.   States the tylenol #3 is not alleviating the pain.   Would like something stronger for the short term.   Patient is not sleeping due to the pain.   Please advise.   Thanks!  Rebecca GRAY

## 2022-04-07 ENCOUNTER — OFFICE VISIT (OUTPATIENT)
Dept: FAMILY MEDICINE | Facility: CLINIC | Age: 77
End: 2022-04-07
Payer: COMMERCIAL

## 2022-04-07 VITALS
OXYGEN SATURATION: 97 % | SYSTOLIC BLOOD PRESSURE: 164 MMHG | TEMPERATURE: 97 F | HEART RATE: 63 BPM | DIASTOLIC BLOOD PRESSURE: 80 MMHG

## 2022-04-07 DIAGNOSIS — M70.62 TROCHANTERIC BURSITIS OF LEFT HIP: Primary | ICD-10-CM

## 2022-04-07 DIAGNOSIS — Z79.899 ENCOUNTER FOR LONG-TERM (CURRENT) USE OF MEDICATIONS: ICD-10-CM

## 2022-04-07 DIAGNOSIS — M25.552 HIP PAIN, LEFT: ICD-10-CM

## 2022-04-07 PROCEDURE — 99213 OFFICE O/P EST LOW 20 MIN: CPT | Mod: 25 | Performed by: FAMILY MEDICINE

## 2022-04-07 PROCEDURE — 20610 DRAIN/INJ JOINT/BURSA W/O US: CPT | Performed by: FAMILY MEDICINE

## 2022-04-07 RX ORDER — TRIAMCINOLONE ACETONIDE 40 MG/ML
40 INJECTION, SUSPENSION INTRA-ARTICULAR; INTRAMUSCULAR ONCE
Status: COMPLETED | OUTPATIENT
Start: 2022-04-07 | End: 2022-04-07

## 2022-04-07 RX ORDER — HYDROCODONE BITARTRATE AND ACETAMINOPHEN 5; 325 MG/1; MG/1
1 TABLET ORAL EVERY 6 HOURS PRN
Qty: 10 TABLET | Refills: 0 | Status: SHIPPED | OUTPATIENT
Start: 2022-04-07 | End: 2023-03-02

## 2022-04-07 RX ADMIN — TRIAMCINOLONE ACETONIDE 40 MG: 40 INJECTION, SUSPENSION INTRA-ARTICULAR; INTRAMUSCULAR at 18:38

## 2022-04-08 ENCOUNTER — TELEPHONE (OUTPATIENT)
Dept: FAMILY MEDICINE | Facility: CLINIC | Age: 77
End: 2022-04-08
Payer: COMMERCIAL

## 2022-04-08 NOTE — TELEPHONE ENCOUNTER
Reason for Call:  Form, our goal is to have forms completed with 72 hours, however, some forms may require a visit or additional information.    Type of letter, form or note:    Special transportation services certificate of need  Request date: 4/8/2022    Who is the form from?:   [facility]    Where did the form come from: form was faxed in    What clinic location was the form placed at?:   [facility]    Where the form was placed:   Dr. Wegener's desk    What number is listed as a contact on the form?:   Fax: [phone]       Additional comments: none    Call taken on 4/8/2022 at 2:02 PM by Janay Syed

## 2022-04-14 ENCOUNTER — TELEPHONE (OUTPATIENT)
Dept: FAMILY MEDICINE | Facility: CLINIC | Age: 77
End: 2022-04-14
Payer: COMMERCIAL

## 2022-04-14 NOTE — TELEPHONE ENCOUNTER
Triage,   Sending as an FYI.   Sandra (patient's caregiver) wanted JW to know the patient finished the Norco and is now switching to tylenol #3 for pain management.   Thanks!  Rebecca GRAY

## 2022-04-26 ENCOUNTER — PATIENT OUTREACH (OUTPATIENT)
Dept: GERIATRIC MEDICINE | Facility: CLINIC | Age: 77
End: 2022-04-26
Payer: COMMERCIAL

## 2022-04-26 NOTE — LETTER
April 26, 2022    ETIENNE GREWAL  [address] [place] [id number]      Dear Etienne:    As a member of [facility] ([facility] ([facility]), you are provided a care coordinator. I will be your new care coordinator as of 5/1/2022. I will be calling you soon to see how you are doing and determine your needs.    If you have any questions, please feel free to call me at [phone]. If you reach my voice mail, please leave a message and your phone number. If you are hearing impaired, please call the [place] Relay at [phone] or [phone] ([email or web address] relay service).    I look forward to speaking with you soon.    Sincerely,    Ifrah Jewell RN  [phone]  [email or web address] is a health plan that contracts with both Medicare and the [place] Medical Assistance (Medicaid) program to provide benefits of both programs to enrollees. Enrollment in [employer] depends on contract renewal.      [facility]  [id number] DHS Approved ([id number])  [id number] (11/18)

## 2022-04-26 NOTE — PROGRESS NOTES
[facility] Care Coordination Contact    Internal CC change effective 5/1/2022.  Mailed member CC Change letter.  Additional tasks to be completed by CMS include: update database & EPIC, enter CC Change in MMIS, and move member files on Q drive.    Melita Lange  Case Management Specialist  [facility]  [phone]

## 2022-05-09 ENCOUNTER — PATIENT OUTREACH (OUTPATIENT)
Dept: OTHER | Facility: CLINIC | Age: 77
End: 2022-05-09
Payer: COMMERCIAL

## 2022-05-09 NOTE — PROGRESS NOTES
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  Please place new referral if needs arise.    Winsome Ocasio NRP, CP  [facility]  Phone number [phone]  [email or web address]

## 2022-05-17 ENCOUNTER — PATIENT OUTREACH (OUTPATIENT)
Dept: GERIATRIC MEDICINE | Facility: CLINIC | Age: 77
End: 2022-05-17
Payer: COMMERCIAL

## 2022-05-17 NOTE — PROGRESS NOTES
TRANSITIONS OF CARE (GRISELDA) LOG   GRISELDA tasks should be completed by the CC within one (1) business day of notification of each transition. Follow up contact with member is required after return to their usual care setting.  Note:  If CC finds out about the transitions fifteen (15) days or more after the member has returned to their usual care setting, no GRISELDA log is needed. However, the CC should check in with the member to discuss the transition process, any changes needed to the care plan and document it in a case note.    Member Name:  Ban [facility] Name[facility]  [facility] Member ID#: [id number]   Product: [facility] Care Coordinator Contact:  Ifrah Jewell RN Agency/[facility]/Care System: [facility]   Transition Communication Actions from Care Management Contact   Transition #1   Notification Date: 5/17/22 Transition Date:   5/11/22  Transition From: Home     Is this the member s usual care setting?               yes Transition To: Hospital, [facility]   Transition Type:  Unplanned  Reason for Admission/Comments:  Failure to thrive in adult, weakness (worse than baseline)  Contact member/responsible party to offer assistance with transition Date completed: 5/17/22 is the day I was notified of this hospitalization. I called the member's legal guardian, Johnson Sanchez, on 5/17/22.    Notes from conversation with the member/responsible party, provider, discharging and receiving facility (as applicable): CC contacted family Johnson, guardian/brother, and Sandra, niece/PCA, and reviewed discharge summary.   Sandra states Wen is recovering and just needs to do more PT to strengthen her legs.  Member has a follow-up appointment with PCP in 7 days: No: Offered Assistance with setting up a follow up appointment   Member has had a change in condition: Yes:legs and abdominal muscles are weaker than usual. Home Care PT is starting on Fri, 5/20/22.  Home visit needed: No  Care plan reviewed and updated.  Transition log  completed.   PCP notified of transition back to home via EMR 5/18/22       Shared CC contact info, care plan/services with receiving setting--Date completed: NA. Care coordinator was notified of hospitalization and discharge on 5/17/22, due to being in another health care system.   Name & Title of receiving setting contact: NA   Notified PCP of transition--Date completed:  5/17/22  via  EMR  Name of PCP: Wegener, Joel Daniel Irwin     Transition #2   Notification Date: 5/17/22 Transition Date: 5/15/22 Transition From: Hospital, [facility]     Is this the member s usual care setting?               no Transition To: Home   Transition Type:  Planned  Reason for Admission/Comments:  Same as above - failure to thrive & weakness  Contact member/responsible party to offer assistance with transition Date completed: 5/17/22    Notes from conversation with the member/responsible party, provider, discharging and receiving facility (as applicable): CC contacted adult son Johnson Sanchez, who is also her guardian. and left a message requesting a return call. I was told by Wen's other brother, that Sandra, her niece & PCA, will be there at 3:00 today. I also left a voicemail for Sandra, asking for return call.  Member has a follow-up appointment with PCP in 7 days: No: Offered Assistance with setting up a follow up appointment   Member has had a change in condition: yes - legs and abdominal muscles weaker than usual. Home care PT starting 5/20/22.  Home visit needed: No  Care plan reviewed and updated.  The following home based services: PCA, transportation, supplies from [facility] were resumed (by family).  New referrals placed: No - hospitalist ordered home care through [facility]. PT starting on Fri, 5/20/22. Member was told to reestablish care with a neurologist. Sandra said she will discuss this with PCP.  Transition log completed.   PCP notified of transition back to home via EMR.     Shared CC contact info, care plan/services  with receiving setting--Date completed: 5/17 & 5/18/22.   Name & Title of receiving setting contact: marilyn Flores/PCA   Notified PCP of transition--Date completed:  5/17/22  via  EMR  Name of PCP: Wegener, Joel Daniel Irwin         *RETURN TO USUAL CARE SETTING: *Complete tasks below when the member is discharging TO their usual care setting within one (1) business day of notification..      For situations where the Care Coordinator is notified of the discharge prior to the date of discharge, the Care Coordinator must follow up with the member or designated representative to confirm that discharge actually occurred and discuss required GRISELDA tasks as outlined in the GRISELDA Instructions.  (This includes situations where it may be a  new  usual care setting for the member. (i.e., a community member who decides upon permanent nursing home placement following hospitalization and rehab).    Discuss with Member/Responsible Party:    Check  Yes  - if the member, family member and/or SNF/facility staff manages the following:  If  No  provide explanation in the comments section.          Date completed: 5/18/22 Communicated with member or their designated representative about the following:  care transition process; about changes to the member s health status; plan of care updates; education about transitions and how to prevent unplanned transitions/readmissions    Four Pillars for Optimal Transition:    Check  Yes  - if the member, family member and/or SNF/facility staff manages the following:  If  No  provide explanation in the comments section.          []  Yes     [x]  No Does the member have a follow-up appointment scheduled with primary care or specialist? (Mental health hospitalizations--the appt. should be w/in 7 days). Sandra said she will schedule this tomorrow.  [x]  Yes     []  No     Has a medication review been completed with member? If no, refer to PCP, home care nurse, MTM, pharmacist  [x]  Yes     []  No     Can the  member manage their medications or is there a system in place to manage medications (e.g. home care set-up)?         [x]  Yes     []  No     Can the member verbalize warning signs and symptoms to watch for and how to respond?  [x]  Yes     []  No     Does the member have a copy of and understand their discharge instructions?  If no, assist to obtain copy of discharge instructions, review discharge instructions, and assist to contact PCP to discuss questions about their recent hospitalization.  [x]  Yes     []  No     Does the member have adequate food, housing and transportation?  If no, add goal and discuss additional supports available to the member                                                                                                                                                                                 [x]  Yes     []  No     Is the member safe in their home?  If no, document needs and support provided                                                                                                                                                                          []  Yes     [x]  No     Are there any concerns of vulnerability, abuse, or neglect?  If yes, document concerns and actions taken by Care Coordinator as a mandated                                                                                                                                                                              [x]  Yes     []  No     Does the member use a Personal Health Care Record?  Check  Yes  if visit summary, discharge summary, and/or healthcare summary are being used as a PHR.                                                                                                                                                                                  [x]  Yes     []  No     Have you reviewed the discharge summary with the member? If  No  provide explanation in comments.  [x]  Yes     []  No      Have you updated the member s care plan/support plan? Add new diagnosis, medications, treatments, goals & interventions, as applicable. If No, provide explanation in comments.    Comments:           Notes from conversation with the member/responsible party, provider, discharging and receiving facility (as applicable): Spoke with marilyn Flores & [facility]. She is the person who sets up medications and schedules rides, etc. Sandra was not aware that Dilantin dose was increased, so we discussed that and did a complete med rec. Sandra will implement med change this evening. No seizures noted.  Went through discharge summary with Sandra and writer advised her to schedule PCP visit within one week, per discharge instructions. Advised to bring a list of things to discuss with Dr. Wegener, including: neuro referral, whether member should be taking Vit D & how many Tums she should take per day, as she is currently taking one and her med list reads 2 daily.  Also told Sandra that she can call me if she needs anything. Specifically told her that if PT does not come after 3:00 PM, when Sandra is at member's home, she should tell the agency or let me know and I will call them.   Care coordinator told Sandra I would be calling in a couple weeks to schedule member's annual assessment. She said she will be available after 6/7. Writer will coordinate with Sandra & Johnson, who is Wen's guardian & brother.  Ifrah Jewell RN  [facility]   [phone]

## 2022-05-18 RX ORDER — LIDOCAINE 50 MG/G
PATCH TOPICAL
COMMUNITY
Start: 2022-04-04

## 2022-05-23 ENCOUNTER — TELEPHONE (OUTPATIENT)
Dept: FAMILY MEDICINE | Facility: CLINIC | Age: 77
End: 2022-05-23
Payer: COMMERCIAL

## 2022-05-23 ENCOUNTER — TELEPHONE (OUTPATIENT)
Dept: GERIATRIC MEDICINE | Facility: CLINIC | Age: 77
End: 2022-05-23
Payer: COMMERCIAL

## 2022-05-23 DIAGNOSIS — N39.3 STRESS INCONTINENCE OF URINE: Primary | ICD-10-CM

## 2022-05-23 NOTE — TELEPHONE ENCOUNTER
Called and spoke w/ suzan.   Patient is scheduled for ed f/u with DANETTE tomorrow.     Rebecca GRAY

## 2022-05-23 NOTE — TELEPHONE ENCOUNTER
[facility] Care Coordination Note:    Pull-Ups order pended for Dr. Wegener to review and sign after seeing the patient on 5/24/22, if agree.     Care coordinator will send Rx to [facility], where Wen gets other medical supplies.     Per marilyn Flores/[facility], the patient needs 3-4 pull-ups per day. Pt's insurance will cover the pull-ups if she has a visit with MD, which is scheduled for 5/24/22.    Thank you.  Ifrah Jewell RN  [facility]   [phone]

## 2022-05-23 NOTE — TELEPHONE ENCOUNTER
I could see her tomorrow 10:40 arrival in-person or virtual if prefered.     Call suzan (niece see demographics) to schedule.     Joel Wegener,MD Dr. Wegener,   I am Wen's new care coordinator. I am routing you this note, to inform you she was admitted to [facility] from 5/11-5/15 for failure to thrive & acute on chronic weakness. Home Care PT is scheduled to start on Fri, 5/20.   I advised family to schedule hospital follow-up with you within one week (if possible).   Of note, her Dilantin level was low and her dose was increased while inpatient. They questioned an unwitnessed seizure as a possible reason for her increased weakness.   Please let me know if I can be of assistance.   Thanks,   Ifrah Jewell RN   [facility]   [phone]    Routing comment

## 2022-05-23 NOTE — TELEPHONE ENCOUNTER
Called and left vm for Sandra to call back.   See DANETTE's note below for details.     Rebecca GRAY

## 2022-05-23 NOTE — PROGRESS NOTES
"Wen is a 77 year old who is being evaluated via a billable telephone visit.      What phone number would you like to be contacted at? home  How would you like to obtain your AVS? Mail a copy    Assessment & Plan     Hospitalized weakness. Caregiver in retrospect feels loss of strength mainly due to \"bad cold.\"  Seizure (although not witnessed) was of some concern as well due to very low dilantin/phenytoin level.   Reviewed current phenytoin dose 100mg am and 200mg pm.  Sandra sets up medications in pill box and another brother brings to her apartment. I recommended verifying that phenytoin in medications and also to monitor if takes/spills/forgets/etc. May need alarm or witnessed administration. Sandra plans to bring her to neurology appt July since June very booked with appointments.     Had one home pt visit then stopped told \"no more room for improvement.\"  After seeing improvement with short term rehab now realizing that perhaps what they thought was baseline strength could be improved upon even further .  Sandra estimates strength/transfer ability still about 40% better than six months ago. Discussing with care coordinator to try to get more home PT.  I will place new home PT order.     Needing refill of incontinence supplies.     Current plan:  -check phenytoin level about three weeks, check urine drug screen ua/cx (recurrent utis) at same time  -neurology consultation July  - I will order home pt  - follow up in person with me annual wellness about three months.     15 minute phone call    Seizure disorder (H)    - phenytoin (DILANTIN) 100 MG capsule; GIVE 100MG IN THE MORNING AND 200MG AT BEDTIME  - PHENYTOIN LEVEL; Future  - PHENYTOIN FREE; Future  - Drug Abuse Screen Panel 13, Urine (Pain Care Package) - lab collect; Future    Medication management    - PHENYTOIN LEVEL; Future  - PHENYTOIN FREE; Future  - Drug Abuse Screen Panel 13, Urine (Pain Care Package) - lab collect; Future    Recurrent UTI    - UA " "reflex to Microscopic - lab collect; Future  - Urine Culture Aerobic Bacterial - lab collect; Future    Nonintractable epileptic spasms without status epilepticus (H)      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   - UA reflex to Microscopic - lab collect; Future  - Urine Culture Aerobic Bacterial - lab collect; Future             Depression Screening Follow Up    PHQ 5/24/2022   PHQ-9 Total Score 12   Q9: Thoughts of better off dead/self-harm past 2 weeks Not at all     At the moment of phone call shadi states \"mood good\" not feeling down.  Sandra (caregiver agrees, just easily frustrated at times).     Follow Up Actions Taken           Return in about 3 months (around 8/24/2022) for lab only then annual wellness 3 months.     Check out requests:Call sandra to schedule lab only for shadi and follow up with me in about 3  months annual wellness.  Also can you check with lab requirements for doing UA and urine drug screen?  Sandra would like to help her collect prior to the lab appt but needs to know what kind of container, how long ahead of time it can be done, etc....     Joel Daniel Wegener, MD  [facility]    Luisa Carver is a 77 year old who presents for the following health issues     HPI   Answers for HPI/ROS submitted by the patient on 5/24/2022  If you checked off any problems, how difficult have these problems made it for you to do your work, take care of things at home, or get along with other people?: Somewhat difficult  PHQ9 TOTAL SCORE: 12  BIA 7 TOTAL SCORE: 14        Hospital Follow-up Visit:    Hospital/Nursing Home/IP Rehab Facility: [facility]  Date of Admission: 5/11/2022  Date of Discharge: 5/15/2022  Reason(s) " for Admission: Weakness, Failure to Thrive      Was your hospitalization related to COVID-19? No   Problems taking medications regularly:  None  Medication changes since discharge: None  Problems adhering to non-medication therapy:  None    Summary of hospitalization:  CareEverywhere information obtained and reviewed  Diagnostic Tests/Treatments reviewed.  Follow up needed: none  Other Healthcare Providers Involved in Patient s Care:         None  Update since discharge: improved. Post Discharge Medication Reconciliation: discharge medications reconciled, continue medications without change.  Plan of care communicated with patient, family and caregiver suzan          See plan for additional hpi    Review of Systems         Objective           Vitals:  No vitals were obtained today due to virtual visit.    Physical Exam   healthy, alert and no distress  Chronic dysarthric speech unchanged.                 Phone call duration: 15 minutes

## 2022-05-24 ENCOUNTER — VIRTUAL VISIT (OUTPATIENT)
Dept: FAMILY MEDICINE | Facility: CLINIC | Age: 77
End: 2022-05-24
Payer: COMMERCIAL

## 2022-05-24 DIAGNOSIS — G81.01 FLACCID HEMIPLEGIA OF RIGHT DOMINANT SIDE DUE TO NONCEREBROVASCULAR ETIOLOGY (H): ICD-10-CM

## 2022-05-24 DIAGNOSIS — N39.0 RECURRENT UTI: ICD-10-CM

## 2022-05-24 DIAGNOSIS — G40.822 NONINTRACTABLE EPILEPTIC SPASMS WITHOUT STATUS EPILEPTICUS (H): ICD-10-CM

## 2022-05-24 DIAGNOSIS — G40.909 SEIZURE DISORDER (H): ICD-10-CM

## 2022-05-24 DIAGNOSIS — Z79.899 MEDICATION MANAGEMENT: Primary | ICD-10-CM

## 2022-05-24 DIAGNOSIS — G89.4 CHRONIC PAIN DISORDER: ICD-10-CM

## 2022-05-24 DIAGNOSIS — G81.91 HEMIPARESIS OF RIGHT DOMINANT SIDE, UNSPECIFIED HEMIPARESIS ETIOLOGY (H): ICD-10-CM

## 2022-05-24 DIAGNOSIS — N39.3 STRESS INCONTINENCE OF URINE: ICD-10-CM

## 2022-05-24 DIAGNOSIS — F09 COGNITIVE DYSFUNCTION: ICD-10-CM

## 2022-05-24 PROCEDURE — 99214 OFFICE O/P EST MOD 30 MIN: CPT | Mod: 95 | Performed by: FAMILY MEDICINE

## 2022-05-24 RX ORDER — PHENYTOIN SODIUM 100 MG/1
CAPSULE, EXTENDED RELEASE ORAL
Qty: 180 CAPSULE | Refills: 1 | COMMUNITY
Start: 2022-05-24 | End: 2022-09-12

## 2022-05-24 ASSESSMENT — ANXIETY QUESTIONNAIRES
3. WORRYING TOO MUCH ABOUT DIFFERENT THINGS: NEARLY EVERY DAY
1. FEELING NERVOUS, ANXIOUS, OR ON EDGE: NEARLY EVERY DAY
GAD7 TOTAL SCORE: 14
8. IF YOU CHECKED OFF ANY PROBLEMS, HOW DIFFICULT HAVE THESE MADE IT FOR YOU TO DO YOUR WORK, TAKE CARE OF THINGS AT HOME, OR GET ALONG WITH OTHER PEOPLE?: SOMEWHAT DIFFICULT
6. BECOMING EASILY ANNOYED OR IRRITABLE: NOT AT ALL
GAD7 TOTAL SCORE: 14
7. FEELING AFRAID AS IF SOMETHING AWFUL MIGHT HAPPEN: NEARLY EVERY DAY
GAD7 TOTAL SCORE: 14
4. TROUBLE RELAXING: MORE THAN HALF THE DAYS
5. BEING SO RESTLESS THAT IT IS HARD TO SIT STILL: NOT AT ALL
2. NOT BEING ABLE TO STOP OR CONTROL WORRYING: NEARLY EVERY DAY
7. FEELING AFRAID AS IF SOMETHING AWFUL MIGHT HAPPEN: NEARLY EVERY DAY

## 2022-05-24 ASSESSMENT — PATIENT HEALTH QUESTIONNAIRE - PHQ9
SUM OF ALL RESPONSES TO PHQ QUESTIONS 1-9: 12
10. IF YOU CHECKED OFF ANY PROBLEMS, HOW DIFFICULT HAVE THESE PROBLEMS MADE IT FOR YOU TO DO YOUR WORK, TAKE CARE OF THINGS AT HOME, OR GET ALONG WITH OTHER PEOPLE: SOMEWHAT DIFFICULT
SUM OF ALL RESPONSES TO PHQ QUESTIONS 1-9: 12

## 2022-05-24 NOTE — PATIENT INSTRUCTIONS
"Hospitalized weakness. Caregiver in retrospect feels loss of strength mainly due to \"bad cold.\"  Seizure (although not witnessed) was of some concern as well due to very low dilantin/phenytoin level.   Reviewed current phenytoin dose 100mg am and 200mg pm.  Sandra sets up medications in pill box and another brother brings to her apartment. I recommended verifying that phenytoin in medications and also to monitor if takes/spills/forgets/etc. May need alarm or witnessed administration. Sandra plans to bring her to neurology appt July since June very booked with appointments.     Had one home pt visit then stopped told \"no more room for improvement.\"  After seeing improvement with short term rehab now realizing that perhaps what they thought was baseline strength could be improved upon even further .  Sandra estimates strength/transfer ability still about 40% better than six months ago. Discussing with care coordinator to try to get more home PT.  I will place new home PT order.     Needing refill of incontinence supplies.     Current plan:  -check phenytoin level about three weeks, check urine drug screen ua/cx (recurrent utis) at same time  -neurology consultation July  - I will order home pt  - follow up in person with me annual wellness about three months.     15 minute phone call  "

## 2022-05-25 NOTE — TELEPHONE ENCOUNTER
Dr. Wegener, will you please sign the attached order for incontinence products for Wen? Care coordinator will send to the medical supply store ([employer]). Thank you.    Ifrah Jewell RN  [facility]   [phone]

## 2022-05-26 ENCOUNTER — PATIENT OUTREACH (OUTPATIENT)
Dept: GERIATRIC MEDICINE | Facility: CLINIC | Age: 77
End: 2022-05-26
Payer: COMMERCIAL

## 2022-05-26 NOTE — PROGRESS NOTES
[facility] Care Coordination Contact    Arranged transportation thru [employer] -[phone] for the below appt:  Appt Date & Time: 6/10/22 at 3:45 pm  Clinic Name & Address: [facility] [address] in [place]  Transportation Provider: [employer] [facility] [phone]   time:  3:00- 3:30pm  RT ride for 2 passengers  Requested van with lift ramp for wheelchair transportation.  Will call return ride home  Sandra Stearns's number, [phone], was provided as the .    Notified Sandra by  of  time.    Shira Emerson  Care Management Specialist  [facility]  [phone]

## 2022-05-27 ENCOUNTER — DOCUMENTATION ONLY (OUTPATIENT)
Dept: LAB | Facility: CLINIC | Age: 77
End: 2022-05-27
Payer: COMMERCIAL

## 2022-05-27 DIAGNOSIS — Z79.899 MEDICATION MANAGEMENT: Primary | ICD-10-CM

## 2022-06-10 ENCOUNTER — LAB (OUTPATIENT)
Dept: LAB | Facility: CLINIC | Age: 77
End: 2022-06-10
Payer: COMMERCIAL

## 2022-06-10 DIAGNOSIS — G40.909 SEIZURE DISORDER (H): ICD-10-CM

## 2022-06-10 DIAGNOSIS — N39.3 STRESS INCONTINENCE OF URINE: ICD-10-CM

## 2022-06-10 DIAGNOSIS — N39.0 RECURRENT UTI: ICD-10-CM

## 2022-06-10 DIAGNOSIS — Z79.899 MEDICATION MANAGEMENT: ICD-10-CM

## 2022-06-10 LAB
ALBUMIN UR-MCNC: NEGATIVE MG/DL
APPEARANCE UR: CLEAR
BILIRUB UR QL STRIP: NEGATIVE
COLOR UR AUTO: YELLOW
ERYTHROCYTE [DISTWIDTH] IN BLOOD BY AUTOMATED COUNT: 12.7 % (ref 10–15)
GLUCOSE UR STRIP-MCNC: NEGATIVE MG/DL
HCT VFR BLD AUTO: 44.9 % (ref 35–47)
HGB BLD-MCNC: 14.7 G/DL (ref 11.7–15.7)
HGB UR QL STRIP: NEGATIVE
KETONES UR STRIP-MCNC: NEGATIVE MG/DL
LEUKOCYTE ESTERASE UR QL STRIP: NEGATIVE
MCH RBC QN AUTO: 33 PG (ref 26.5–33)
MCHC RBC AUTO-ENTMCNC: 32.7 G/DL (ref 31.5–36.5)
MCV RBC AUTO: 101 FL (ref 78–100)
NITRATE UR QL: NEGATIVE
PH UR STRIP: 7 [PH] (ref 5–7)
PLATELET # BLD AUTO: 202 10E3/UL (ref 150–450)
RBC # BLD AUTO: 4.46 10E6/UL (ref 3.8–5.2)
SP GR UR STRIP: 1.01 (ref 1–1.03)
UROBILINOGEN UR STRIP-ACNC: 0.2 E.U./DL
WBC # BLD AUTO: 6.3 10E3/UL (ref 4–11)

## 2022-06-10 PROCEDURE — 81003 URINALYSIS AUTO W/O SCOPE: CPT

## 2022-06-10 PROCEDURE — 80186 ASSAY OF PHENYTOIN FREE: CPT | Mod: 90

## 2022-06-10 PROCEDURE — 87086 URINE CULTURE/COLONY COUNT: CPT

## 2022-06-10 PROCEDURE — 36415 COLL VENOUS BLD VENIPUNCTURE: CPT

## 2022-06-10 PROCEDURE — 80185 ASSAY OF PHENYTOIN TOTAL: CPT

## 2022-06-10 PROCEDURE — 80306 DRUG TEST PRSMV INSTRMNT: CPT

## 2022-06-10 PROCEDURE — 85027 COMPLETE CBC AUTOMATED: CPT

## 2022-06-10 PROCEDURE — 99000 SPECIMEN HANDLING OFFICE-LAB: CPT

## 2022-06-11 LAB
AMPHETAMINES UR QL: NOT DETECTED
BARBITURATES UR QL SCN: DETECTED
BENZODIAZ UR QL SCN: NOT DETECTED
BUPRENORPHINE UR QL: NOT DETECTED
CANNABINOIDS UR QL: NOT DETECTED
COCAINE UR QL SCN: NOT DETECTED
D-METHAMPHET UR QL: NOT DETECTED
METHADONE UR QL SCN: NOT DETECTED
OPIATES UR QL SCN: DETECTED
OXYCODONE UR QL SCN: NOT DETECTED
PCP UR QL SCN: NOT DETECTED
PROPOXYPH UR QL: NOT DETECTED
TRICYCLICS UR QL SCN: NOT DETECTED

## 2022-06-13 ENCOUNTER — PATIENT OUTREACH (OUTPATIENT)
Dept: GERIATRIC MEDICINE | Facility: CLINIC | Age: 77
End: 2022-06-13
Payer: COMMERCIAL

## 2022-06-13 LAB
BACTERIA UR CULT: NORMAL
PHENYTOIN SERPL-MCNC: 8.8 MG/L

## 2022-06-13 NOTE — PROGRESS NOTES
Per SUKHDEV, CC notifed    Ban Petersen 1945 pull ups 5/27/2022 Ifrah Goodrich  DELIVERED 6/9/2022     Melita Lange  Case Management Specialist  [facility]  [phone]

## 2022-06-13 NOTE — PROGRESS NOTES
"[facility] Care Coordination Contact    Securely emailed [facility] to confirm what supplies the member is currently getting from them. Asked for cost and whether the items are covered by Elderly Waiver or MA. Pt's niece/PCA, told me she does not need the Ultra thin pads that she was receiving from [employer] any more. They received the new Pull-Ups, and that is what member currently wears 24/7.     Also spoke with Sandra, to confirm assessment on Wed, 6/15. Sandra reports she is taking about one month off from caring for Wen, due to disagreements with Johnson, her uncle and the member's brother & legal guardian.    Per Sandra, Johnson is 's medications and he, Meliton (brother/PCA) and Fadi (nephew & Sandra's brother) are covering the PCA hours while Sandra is away.    Sandra states the member is safe and being cared for, but she states Johnson, \"Stops things that I put in place\". Sandra would not go into any further details besides the fact that she and Johnson disagree and she needs to be away while they work on a solution. She again states she believes the Wen is safe and is being cared for.    Writer will see Wen and Johnson on Wed, 6/15/22. Sandra said okay to contact her for any further questions.  Ifrah Jewell RN  [facility]   [phone]    "

## 2022-06-15 ENCOUNTER — PATIENT OUTREACH (OUTPATIENT)
Dept: GERIATRIC MEDICINE | Facility: CLINIC | Age: 77
End: 2022-06-15
Payer: COMMERCIAL

## 2022-06-15 LAB — PHENYTOIN FREE SERPL-MCNC: 0.6 UG/ML

## 2022-06-15 SDOH — ECONOMIC STABILITY: INCOME INSECURITY: IN THE LAST 12 MONTHS, WAS THERE A TIME WHEN YOU WERE NOT ABLE TO PAY THE MORTGAGE OR RENT ON TIME?: NO

## 2022-06-15 ASSESSMENT — SOCIAL DETERMINANTS OF HEALTH (SDOH)
IN A TYPICAL WEEK, HOW MANY TIMES DO YOU TALK ON THE PHONE WITH FAMILY, FRIENDS, OR NEIGHBORS?: MORE THAN THREE TIMES A WEEK
HOW OFTEN DO YOU ATTENT MEETINGS OF THE CLUB OR ORGANIZATION YOU BELONG TO?: NEVER
HOW OFTEN DO YOU GET TOGETHER WITH FRIENDS OR RELATIVES?: MORE THAN THREE TIMES A WEEK
HOW OFTEN DO YOU ATTEND CHURCH OR RELIGIOUS SERVICES?: NEVER
HOW HARD IS IT FOR YOU TO PAY FOR THE VERY BASICS LIKE FOOD, HOUSING, MEDICAL CARE, AND HEATING?: HARD
DO YOU BELONG TO ANY CLUBS OR ORGANIZATIONS SUCH AS CHURCH GROUPS UNIONS, FRATERNAL OR ATHLETIC GROUPS, OR SCHOOL GROUPS?: NO

## 2022-06-15 ASSESSMENT — LIFESTYLE VARIABLES
HOW OFTEN DO YOU HAVE A DRINK CONTAINING ALCOHOL: NEVER
HOW OFTEN DO YOU HAVE SIX OR MORE DRINKS ON ONE OCCASION: NEVER
HOW MANY STANDARD DRINKS CONTAINING ALCOHOL DO YOU HAVE ON A TYPICAL DAY: PATIENT DOES NOT DRINK
AUDIT-C TOTAL SCORE: 0
SKIP TO QUESTIONS 9-10: 1

## 2022-06-16 NOTE — PROGRESS NOTES
[facility] Care Coordination Contact    [facility] Home Visit Assessment  (COVID-19 remote assessment done)     Home visit for Health Risk Assessment with Ban M Nicola completed on Nenita 15, 2022    Type of residence:: Private home - stairs  Current living arrangement:: I live in a private home with family     Assessment completed with:: Patient, Family    Current Care Plan  Member currently receiving the following home care services:     Member currently receiving the following community resources: [facility], County Programs, County Worker, DME & special transportation.    Medication Review  Medication reconciliation completed in Epic: Yes  Medication set-up & administration: Family/informal caregiver sets up every two weeks.  Family caregiver administers medications.  Medication Risk Assessment Medication (1 or more, place referral to MTM): N/A: No risk factors identified  MTM Referral Placed: No: No risk factors idenified   marilyn Flores, sets up medications but Johnson wants to learn more about the meds. I will mail him a current medication list, as the one they have is older.     Mental/Behavioral Health   Depression Screening: Difficult to assess with PHQ-2 or PHQ-9, due to cognitive impairment.  Patient and Johnson report her moods are labile and sometimes she feels very down, while other times she is happy/content. Per JohnsonWen's moods vary a lot and are not predictable.      Mental health DX:: No        Falls Assessment:   Fallen 2 or more times in the past year?: No   Any fall with injury in the past year?: No    ADL/IADL Dependencies:   Dependent ADLs:: Bathing, Dressing, Eating, Grooming, Incontinence, Positioning, Transfers, Wheelchair-with assist, Toileting  Dependent IADLs:: Cleaning, Cooking, Laundry, Shopping, Meal Preparation, Medication Management, Money Management, Transportation, Incontinence    [facility] Health Plan sponsored benefits: Shared information re: Silver Sneakers/gym  memberships, ASA, Calcium +D.    PCA Assessment completed at visit: Yes Annual PCA assessment indicated 46 units per day of PCA. This is the same as the previous assessment.     Elderly Waiver Eligibility: Yes-will continue on EW    Care Plan & Recommendations: Wen will continue with the same services of PCA for 11.5 hours per day, which is divided between her brothers, nephew and niece. Her brother Johnson, who is her legal guardian, said that money is always tight and he would appreciate knowing if the member qualifies for any food assistance or other benefits. Care coordinator will look into this and call the family back. Member is drinking one can of Ensure today, which Johnson pays out of pocket for. They want her to drink it to make sure she is getting all the nutrients she needs, in order to be as strong and healthy as possible. I told him I will see if patient's benefits can pay for this.    Wen was advised to re-establish with neurology when she was inpatient in May 2022. The doctors questioned possible unwitnessed seizures, as patient's Dilantin level was low and she was very weak. PCP reminded patient and family that she should schedule with neurology. Per Johnson, the weakness improved after the hospitalization and he thinks she has been doing really well for the past 2 weeks, with minimal pain and more strength. He also reports a very good appetite and a daily routine that helps Wen feel somewhat in control of her day. She does have to be reminded to wait for her family or PCA before trying to stand up from her wheel chair or bed.    The member has a diagnosis of macular degeneration, yet Johnson reports she has not had an eye exam in over one year. He agreed that she needs to be seen and would like help from writer to schedule an appt and special W/C transportation. Johnson will ride along with the patient, but he is unable to get her in and out of a regular cab or vehicle; she needs a W/C  geo.    Member and family were advised to follow up with PCP as directed and as needed. Also advised to call clinic and speak with triage RN if they have concerns about symptoms.    See [facility] for detailed assessment information.    Follow-Up Plan: Member informed of future contact, plan to f/u with member with a 6 month telephone assessment.  Contact information shared with member and family, encouraged member to call with any questions or concerns at any time.    [place] care continuum providers: Please see Snapshot and Care Management Flowsheets for Specific details of care plan.    This CC note routed to PCP.  Ifrah Jewell RN  [facility]   [phone]

## 2022-06-22 ENCOUNTER — TELEPHONE (OUTPATIENT)
Dept: GERIATRIC MEDICINE | Facility: CLINIC | Age: 77
End: 2022-06-22

## 2022-06-22 NOTE — TELEPHONE ENCOUNTER
Thank you.  Per my knowledge Wen has not had recurrent seizures since the May 11 hospitalization so I would not recommend changing the dose at this time.     Joel Wegener,MD

## 2022-06-22 NOTE — TELEPHONE ENCOUNTER
The patient's phenytoin level was very low at < 0.20, when she was inpatient at [facility] on 5/14/22.    Wen had a virtual hospital follow up on 5/24/22 and Dr. Wegener reviewed the correct phenytoin dose that day with SIXTO Flores/niece, which was 100 mg AM & 200 mg PM. I had a phone assessment with Wen's brother, Johnson, on 6/15 and reviewed dose with him that day as well.      Phenytoin, free rechecked 6/10/22 (resulted 6/15/22) at [facility] and was still low at 0.6 (ref range 1.0 -2.5).    Routing to Dr. Wegener to please advise. If you want Wen to adjust her phenytoin dose, please have your team contact her family/caregivers to advise. Also, please let them know when you would like to recheck her level.     If this lab was ordered by another provider, I apologize, as I am unable to see who ordered it. I can see it was drawn at the [facility] on 6/10.    If your team is unable to reach the family, please have them call me at the number below and I will reach out as well. Thank you!    Ifrah Jewell, RN Care Coordinator  [facility]   [phone]

## 2022-06-24 ENCOUNTER — PATIENT OUTREACH (OUTPATIENT)
Dept: GERIATRIC MEDICINE | Facility: CLINIC | Age: 77
End: 2022-06-24

## 2022-06-24 NOTE — PROGRESS NOTES
[facility] Care Coordination Contact    Received after visit chart from care coordinator.  Completed following tasks: Mailed copy of care plan to client, Updated services in Database, Submitted referrals/auths for wipes, Mailed copy of POC & PCA signature sheets for member to sign and return in SASE , Sent the following resources/forms: Current Med List  and Mailed [facility] Safe Medication Disposal   , Provider Signature - No POC Shared:  Member indicates that they do not want their POC shared with any EW providers.    [facility]:  Emailed completed PCA assessment to [facility].  Faxed copy of PCA assessment to PCA Agency and mailed copy to member.  Faxed MD Communication to PCP.     Melita Lange  Case Management Specialist  [facility]  [phone]

## 2022-06-24 NOTE — LETTER
June 24, 2022      ETIENNE GREWAL  [address] [place] [id number]      Dear Etienne:    At [facility], we are dedicated to improving your health and well-being. Enclosed is the Comprehensive Care Plan that we developed with you on 6/15/2022. Please review the Care Plan carefully.    As a reminder, some of the things we discussed at your visit include:    Your physical and mental health    Ways to reduce falls    Health care needs you may have    Don t forget to contact your care coordinator if you:    Have been hospitalized or plan to be hospitalized     Have had a fall     Have experienced a change in physical health    Are experiencing emotional problems     If you do not agree with your Care Plan, have questions about it, or have experienced a change in your needs, please call me at [phone]. If you are hearing impaired, please call the [place] Relay at 024 or [phone] (coxthy-cy-ihqosi relay service).    Sincerely,    Ifrah Jewell RN  [phone]  @[email or web address].[facility] ([facility]) is a health plan that contracts with both Medicare and the [place] Medical Assistance (Medicaid) program to provide benefits of both programs to enrollees. Enrollment in [employer] depends on contract renewal.    [id number])     [id number] (11/18)

## 2022-06-29 ENCOUNTER — PATIENT OUTREACH (OUTPATIENT)
Dept: GERIATRIC MEDICINE | Facility: CLINIC | Age: 77
End: 2022-06-29

## 2022-06-29 NOTE — PROGRESS NOTES
"[facility] Care Coordination Contact    Member's brother/guardian, Toan, called to ask for a few things for Wen.     1. He wants help to schedule an eye doctor appointment for Wen. He said \"She can hardly see\", but when I questioned him about this, he said her vision has been the same for at least 6 months. She also has a diagnosis of age related macular degeneration.    I told Toan that I can help them schedule an eye doctor visit and he would like it to be as soon as we can get her in.    2. Toan asked about \"food stamps\". I gave him the phone number for [facility], along with Wen's case # [id number] & Team # [phone] (financial workers). Advised Toan to call the [facility] and ask whether Wen could be eligible for nutrition assistance and informed him they will need to apply for SNAP.    3. Toan also asked about member getting her Ensure paid for by MA. I told him that MA does cover Ensure, with a doctor's letter of Medical Necessity. Advised Wen will need a doctor appt to discuss with PCP and they will have to fill out the required paperwork.    Advised I would call Toan back tomorrow after calling for an eye appt. Toan voiced understanding and agreed with the plan.  Ifrah Jewell RN  [facility]   [phone]      "

## 2022-06-30 ENCOUNTER — PATIENT OUTREACH (OUTPATIENT)
Dept: GERIATRIC MEDICINE | Facility: CLINIC | Age: 77
End: 2022-06-30

## 2022-06-30 NOTE — PROGRESS NOTES
[facility] Care Coordination Contact  CMS Scheduled the following appointment for member:    Complete eye exam for glasses at [facility] [address] (4th floor per ); [place] [address]. CC will cancel appt previously scheduled at a different clinic on a later date.    Arranged transportation thru [facility] -[phone] for the below appt:  Appt Date & Time: 08/10/2022 1:45pm  Clinic Name & Address:  [facility] [address] (4th floor per ); [place] [address]  Transportation Provider: [employer]   time: 1:10pm-1:20pm    Notified brother (Toan [phone]) of  time. 2 passengers for round trip ride with lift ramp van - will call return ride home. CMS also sending letter with tips for transportation via post mail.    Katrin Perry  Care Management Specialist  [facility]  [phone]

## 2022-06-30 NOTE — PROGRESS NOTES
Cancelled ophthalmology appt at [facility] for 8/18, as Katrin was able to get an appt for the member sooner through [facility] at [facility]. Please disregard the appt information in the note below.  Ifrah Jewell RN  [facility]   [phone]

## 2022-06-30 NOTE — PROGRESS NOTES
Katrin MORELAND CMS, scheduled a complete eye exam for the member at the [facility], along with special W/C transportation.     Per Katrin, she left a voicemail for Toan, pt's brother/guardian, with details regarding the appt and transportation. She also mailed him a letter today, with appt & transportation details. See details in other Patient Outreach encounter dated 6/30/22, per Katrin.  Ifrah Jewell RN  [facility]   [phone]

## 2022-07-01 ENCOUNTER — PATIENT OUTREACH (OUTPATIENT)
Dept: GERIATRIC MEDICINE | Facility: CLINIC | Age: 77
End: 2022-07-01

## 2022-07-01 NOTE — PROGRESS NOTES
Member was authorized for 11.5 hours of PCA services per day. See note below for the confirmation from [facility].    This care coordinator communicated with Rebecca from [facility], 's PCA agency, and she informed Wen Joya's brother/guardian, that the PCAs meet requirements, their services are eligible to be paid at the 7.5% enhanced rate.  Ifrah Jewell RN  [facility]   [phone]

## 2022-07-01 NOTE — PROGRESS NOTES
Member receives over 10 hours of PCA services per month, so her caregivers can qualify for PCA Enhanced Rate. I reached out to Rebecca at  PCA agency, [facility] ([phone]), and she reports sending a letter to Toan with details of how to get the Enhanced Rate.  Ifrah Jewell RN  [facility]   [phone]

## 2022-07-01 NOTE — PROGRESS NOTES
Per the [facility]:    Ifrah  [id number] ETIENNE CHATTERJEESTON 1945  [employer] [facility] PCA: Assess Same PCA:   8/1/2022 12/31/2022 Medically Necessary 7038 PCA Services approved at 10 or more hours per day. If billing (modifier TG) and provider requirements are met services are eligible to be paid at the 7.5% enhanced rate   Ifrah  [id number] ETIENNE CHATTERJEESTON 1945  [employer] [facility]. PCA: Assess Same PCA:   1/1/2023 6/30/2023 Medically Necessary 8326 PCA Services approved at 10 or more hours per day. If billing (modifier TG) and provider requirements are met services are eligible to be paid at the 7.5% enhanced rate

## 2022-07-26 ENCOUNTER — TELEPHONE (OUTPATIENT)
Dept: FAMILY MEDICINE | Facility: CLINIC | Age: 77
End: 2022-07-26

## 2022-07-26 NOTE — TELEPHONE ENCOUNTER
Reason for Call:  Form, our goal is to have forms completed with 72 hours, however, some forms may require a visit or additional information.    Type of letter, form or note:  Medical supplies - gloves    Who is the form from?: [facility] (if other please explain)    Where did the form come from: form was faxed in    What clinic location was the form placed at?: [facility]    Where the form was placed: 's office    What number is listed as a contact on the form?: return fax: [phone]       Additional comments: NA    Call taken on 7/26/2022 at 7:29 AM by Rebecca Chaudhary

## 2022-07-26 NOTE — TELEPHONE ENCOUNTER
Please call caregiver suzan and ask to remind me what they use gloves for needed for glove order.     Sterile/non-sterile?  For wound care, etc?     Joel Wegener,MD w

## 2022-08-08 ENCOUNTER — MEDICAL CORRESPONDENCE (OUTPATIENT)
Dept: HEALTH INFORMATION MANAGEMENT | Facility: CLINIC | Age: 77
End: 2022-08-08

## 2022-08-08 PROBLEM — N39.3 STRESS INCONTINENCE OF URINE: Status: ACTIVE | Noted: 2022-08-08

## 2022-08-08 NOTE — TELEPHONE ENCOUNTER
Care Coordinator Note    They use gloves for her incontinence care.  Please let me know if I can be of assistance.    Ifrah Jewell RN  [facility]   [phone]

## 2022-08-08 NOTE — TELEPHONE ENCOUNTER
Orders signed, in tower, Faxed, and sent for scanning  [facility] Unit Coordinator

## 2022-08-10 ENCOUNTER — OFFICE VISIT (OUTPATIENT)
Dept: OPHTHALMOLOGY | Facility: CLINIC | Age: 77
End: 2022-08-10
Payer: COMMERCIAL

## 2022-08-10 DIAGNOSIS — Z96.1 PSEUDOPHAKIA, RIGHT EYE: ICD-10-CM

## 2022-08-10 DIAGNOSIS — H25.12 NUCLEAR SCLEROSIS, LEFT: ICD-10-CM

## 2022-08-10 DIAGNOSIS — H52.203 MYOPIC ASTIGMATISM OF BOTH EYES: ICD-10-CM

## 2022-08-10 DIAGNOSIS — H52.13 MYOPIC ASTIGMATISM OF BOTH EYES: ICD-10-CM

## 2022-08-10 DIAGNOSIS — H35.351 MACULAR EDEMA, CYSTOID, RIGHT: Primary | ICD-10-CM

## 2022-08-10 PROCEDURE — 92134 CPTRZ OPH DX IMG PST SGM RTA: CPT | Performed by: OPTOMETRIST

## 2022-08-10 PROCEDURE — 92002 INTRM OPH EXAM NEW PATIENT: CPT | Performed by: OPTOMETRIST

## 2022-08-10 ASSESSMENT — REFRACTION_MANIFEST
OS_AXIS: 052
OS_SPHERE: -1.50
OS_CYLINDER: +1.00
OD_SPHERE: -1.50
METHOD_AUTOREFRACTION: 1
OD_AXIS: 121
OD_CYLINDER: +1.75
OD_SPHERE: -2.50
OD_CYLINDER: +1.00
OD_AXIS: 075

## 2022-08-10 ASSESSMENT — TONOMETRY
IOP_METHOD: ICARE
OD_IOP_MMHG: 9
OS_IOP_MMHG: 9

## 2022-08-10 ASSESSMENT — VISUAL ACUITY
METHOD: SNELLEN - LINEAR
OS_SC: LP WITH PROJECTION
METHOD_MR_RETINOSCOPY: 1
OD_SC: 20/600

## 2022-08-10 ASSESSMENT — CONF VISUAL FIELD
OS_SUPERIOR_TEMPORAL_RESTRICTION: 1
METHOD: COUNTING FINGERS
OS_INFERIOR_NASAL_RESTRICTION: 1
OS_INFERIOR_TEMPORAL_RESTRICTION: 1
OS_SUPERIOR_NASAL_RESTRICTION: 1
OD_NORMAL: 1

## 2022-08-10 ASSESSMENT — EXTERNAL EXAM - RIGHT EYE: OD_EXAM: NORMAL

## 2022-08-10 ASSESSMENT — EXTERNAL EXAM - LEFT EYE: OS_EXAM: NORMAL

## 2022-08-10 ASSESSMENT — SLIT LAMP EXAM - LIDS
COMMENTS: NORMAL
COMMENTS: NORMAL

## 2022-08-10 NOTE — NURSING NOTE
Chief Complaints and History of Present Illnesses   Patient presents with     COMPREHENSIVE EYE EXAM     New Pt here for annual CEE.     Chief Complaint(s) and History of Present Illness(es)     COMPREHENSIVE EYE EXAM     Laterality: both eyes    Onset: gradual    Onset: years ago    Course: gradually worsening    Associated symptoms: Negative for dryness, eye pain and tearing    Pain scale: 0/10    Comments: New Pt here for annual CEE.              Comments     Last exam was about 5 years ago.  Pt states vision is blurry.    ALISSON Guo August 10, 2022 2:06 PM

## 2022-08-10 NOTE — PROGRESS NOTES
History  HPI     COMPREHENSIVE EYE EXAM     In both eyes.  Onset was gradual.  This started years ago.  Since onset it is gradually worsening.  Associated symptoms include Negative for dryness, eye pain and tearing.  Pain was noted as 0/10. Additional comments: New Pt here for annual CEE.              Comments     Last exam was about 5 years ago.  Pt states vision is blurry.    ALISSON Guo August 10, 2022 2:06 PM              Last edited by Maggie Moreira COT on 8/10/2022  2:06 PM. (History)          Assessment/Plan  (H35.351) Macular edema, cystoid, right  (primary encounter diagnosis)  Comment: Significant macular edema right eye   Plan: OCT Retina Spectralis OD (right eye)         Educated patient and brother on clinical findings. Referred to Dr. Chacko for retina consultation.    (Z96.1) Pseudophakia, right eye  Comment: Clear lens, no PCO, not contributing to vision loss  Plan:  No treatment indicated at this time. Monitor annually.    (H25.12) Nuclear sclerosis, left  Comment: Dense, brunescent cataract left eye; no view of posterior pole  Plan:  Recommend B-scan at retina consultation to determine if cataract extraction would be beneficial.    (H52.203,  H52.13) Myopic astigmatism of both eyes  Comment: Myopic astigmatism both eyes   Plan:  Explained that spectacle correction would not result in meaningful improvement in vision at this time.     Complete documentation of historical and exam elements from today's encounter can  be found in the full encounter summary report (not reduplicated in this progress  note). I personally obtained the chief complaint(s) and history of present illness. I  confirmed and edited as necessary the review of systems, past medical/surgical  history, family history, social history, and examination findings as documented by  others; and I examined the patient myself. I personally reviewed the relevant tests,  images, and reports as documented above. I formulated and  edited as necessary the  assessment and plan and discussed the findings and management plan with the  patient and family.    Adriano Rosado OD, FAAO

## 2022-08-17 ENCOUNTER — PATIENT OUTREACH (OUTPATIENT)
Dept: GERIATRIC MEDICINE | Facility: CLINIC | Age: 77
End: 2022-08-17

## 2022-08-17 NOTE — PROGRESS NOTES
CC updated program tasks and targets for [employer] launch.  Ifrah Jewell RN  [facility]   [phone]

## 2022-08-18 DIAGNOSIS — H35.351 MACULAR EDEMA, CYSTOID, RIGHT: Primary | ICD-10-CM

## 2022-08-18 DIAGNOSIS — H25.12 NUCLEAR SCLEROSIS, LEFT: ICD-10-CM

## 2022-08-29 ENCOUNTER — OFFICE VISIT (OUTPATIENT)
Dept: OPHTHALMOLOGY | Facility: CLINIC | Age: 77
End: 2022-08-29
Attending: OPHTHALMOLOGY
Payer: COMMERCIAL

## 2022-08-29 DIAGNOSIS — H25.12 NUCLEAR SCLEROSIS, LEFT: ICD-10-CM

## 2022-08-29 DIAGNOSIS — H31.8 CHOROIDAL LESION: Primary | ICD-10-CM

## 2022-08-29 DIAGNOSIS — H35.351 MACULAR EDEMA, CYSTOID, RIGHT: ICD-10-CM

## 2022-08-29 PROCEDURE — 99207 FUNDUS PHOTOS OU (BOTH EYES): CPT | Mod: 26 | Performed by: STUDENT IN AN ORGANIZED HEALTH CARE EDUCATION/TRAINING PROGRAM

## 2022-08-29 PROCEDURE — 99204 OFFICE O/P NEW MOD 45 MIN: CPT | Mod: GC | Performed by: STUDENT IN AN ORGANIZED HEALTH CARE EDUCATION/TRAINING PROGRAM

## 2022-08-29 PROCEDURE — 92242 FLUORESCEIN&ICG ANGIOGRAPHY: CPT | Performed by: OPHTHALMOLOGY

## 2022-08-29 PROCEDURE — 92134 CPTRZ OPH DX IMG PST SGM RTA: CPT | Mod: 26 | Performed by: STUDENT IN AN ORGANIZED HEALTH CARE EDUCATION/TRAINING PROGRAM

## 2022-08-29 PROCEDURE — 76512 OPH US DX B-SCAN: CPT | Mod: 26 | Performed by: STUDENT IN AN ORGANIZED HEALTH CARE EDUCATION/TRAINING PROGRAM

## 2022-08-29 PROCEDURE — 92242 FLUORESCEIN&ICG ANGIOGRAPHY: CPT | Mod: 26 | Performed by: STUDENT IN AN ORGANIZED HEALTH CARE EDUCATION/TRAINING PROGRAM

## 2022-08-29 PROCEDURE — 76519 ECHO EXAM OF EYE: CPT | Performed by: OPHTHALMOLOGY

## 2022-08-29 PROCEDURE — G0463 HOSPITAL OUTPT CLINIC VISIT: HCPCS | Mod: 25

## 2022-08-29 PROCEDURE — 92250 FUNDUS PHOTOGRAPHY W/I&R: CPT | Mod: 59 | Performed by: OPHTHALMOLOGY

## 2022-08-29 PROCEDURE — 92134 CPTRZ OPH DX IMG PST SGM RTA: CPT | Performed by: OPHTHALMOLOGY

## 2022-08-29 PROCEDURE — 76519 ECHO EXAM OF EYE: CPT | Mod: 26 | Performed by: STUDENT IN AN ORGANIZED HEALTH CARE EDUCATION/TRAINING PROGRAM

## 2022-08-29 PROCEDURE — 76512 OPH US DX B-SCAN: CPT | Performed by: OPHTHALMOLOGY

## 2022-08-29 ASSESSMENT — VISUAL ACUITY
OD_SC: 20/800
METHOD: SNELLEN - LINEAR
OD_SC: 20/250

## 2022-08-29 ASSESSMENT — CONF VISUAL FIELD
OS_SUPERIOR_NASAL_RESTRICTION: 1
OD_SUPERIOR_NASAL_RESTRICTION: 3
OS_INFERIOR_TEMPORAL_RESTRICTION: 1
OS_SUPERIOR_TEMPORAL_RESTRICTION: 1
OD_SUPERIOR_TEMPORAL_RESTRICTION: 3
OD_INFERIOR_NASAL_RESTRICTION: 3
OS_INFERIOR_NASAL_RESTRICTION: 1
OD_INFERIOR_TEMPORAL_RESTRICTION: 3

## 2022-08-29 ASSESSMENT — EXTERNAL EXAM - LEFT EYE: OS_EXAM: NORMAL

## 2022-08-29 ASSESSMENT — TONOMETRY
OD_IOP_MMHG: 10
IOP_METHOD: TONOPEN
OS_IOP_MMHG: 10

## 2022-08-29 ASSESSMENT — SLIT LAMP EXAM - LIDS
COMMENTS: NORMAL
COMMENTS: NORMAL

## 2022-08-29 ASSESSMENT — EXTERNAL EXAM - RIGHT EYE: OD_EXAM: NORMAL
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
## 2022-08-29 NOTE — Clinical Note
exam under anesthesia+ Cataract extraction intraocular lens left eye + possible Avastin both eyes + possible cryotherapy + possible indirect laser ophthalmoscopy left eye  1.5 hr surgery General anesthesia + peribulbar block
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
## 2022-08-30 ENCOUNTER — TELEPHONE (OUTPATIENT)
Dept: OPHTHALMOLOGY | Facility: CLINIC | Age: 77
End: 2022-08-30

## 2022-08-30 NOTE — TELEPHONE ENCOUNTER
I called patient to schedule surgery with Dr. Mary Chacko, I left a voicemail with callback # [phone]

## 2022-08-31 NOTE — PROGRESS NOTES
ADDENDUM: Cancelled appt at [facility] as Katrin was able to get an appt for the member sooner. Please disregard the appt information below.    Scheduled ophthalmology appt:  Complete eye exam  [facility]   [address]   [phone]  Thurs, 8/18, 1:45 PM (first available)    Tasked CHON Wilkes, to set up special transportation for the appointment. Also asked if she had time to contact the [facility] ophthalmology clinic, at [facility], to check if they have anything open sooner. If not, it is okay as there are no major changes in Wen's vision, per her brother Toan.    Ifrah Jewell RN  [facility]   [phone]           Yes

## 2022-09-12 ENCOUNTER — TELEPHONE (OUTPATIENT)
Dept: OPHTHALMOLOGY | Facility: CLINIC | Age: 77
End: 2022-09-12

## 2022-09-12 DIAGNOSIS — G40.909 SEIZURE DISORDER (H): ICD-10-CM

## 2022-09-12 PROBLEM — H25.12 NUCLEAR SCLEROSIS, LEFT: Status: ACTIVE | Noted: 2022-09-12

## 2022-09-12 PROBLEM — H31.8 CHOROIDAL LESION: Status: ACTIVE | Noted: 2022-09-12

## 2022-09-12 RX ORDER — PHENYTOIN SODIUM 100 MG/1
CAPSULE, EXTENDED RELEASE ORAL
Qty: 180 CAPSULE | Refills: 3 | Status: SHIPPED | OUTPATIENT
Start: 2022-09-12 | End: 2023-01-01

## 2022-09-12 NOTE — TELEPHONE ENCOUNTER
Routing refill request to provider for review/approval because:  Medication is reported/historical  Nayana BOYER RN

## 2022-09-12 NOTE — TELEPHONE ENCOUNTER
Patient is scheduled for surgery with Dr. Mary hCacko     Spoke with: Johnson Sanchez (Legal Guardian)     Date of Surgery: 09/27     Location: [facility]:  [address] [place] [id number]     H&P will be completed at: NP clinic     COVID testing: At home Rapid Antigen    Post Op scheduled on 09/28, and 10/05     Surgery packet was mailed 09/12     Additional comments: Advised RN will call 1 - 3 business days prior with arrival time and instructions. Informed patient they will need an adult  and responsible adult to stay with for 24 hours following surgery

## 2022-09-19 ENCOUNTER — OFFICE VISIT (OUTPATIENT)
Dept: FAMILY MEDICINE | Facility: CLINIC | Age: 77
End: 2022-09-19
Payer: COMMERCIAL

## 2022-09-19 ENCOUNTER — LAB (OUTPATIENT)
Dept: LAB | Facility: CLINIC | Age: 77
End: 2022-09-19
Payer: COMMERCIAL

## 2022-09-19 VITALS
TEMPERATURE: 97.6 F | RESPIRATION RATE: 17 BRPM | HEART RATE: 72 BPM | BODY MASS INDEX: 25.79 KG/M2 | WEIGHT: 155 LBS | DIASTOLIC BLOOD PRESSURE: 77 MMHG | OXYGEN SATURATION: 97 % | SYSTOLIC BLOOD PRESSURE: 134 MMHG

## 2022-09-19 DIAGNOSIS — Z01.818 PRE-OP EXAM: Primary | ICD-10-CM

## 2022-09-19 DIAGNOSIS — Z01.818 PRE-OP EXAM: ICD-10-CM

## 2022-09-19 DIAGNOSIS — H61.23 BILATERAL IMPACTED CERUMEN: ICD-10-CM

## 2022-09-19 LAB
ANION GAP SERPL CALCULATED.3IONS-SCNC: 9 MMOL/L (ref 7–15)
BUN SERPL-MCNC: 11.9 MG/DL (ref 8–23)
CALCIUM SERPL-MCNC: 10 MG/DL (ref 8.8–10.2)
CHLORIDE SERPL-SCNC: 105 MMOL/L (ref 98–107)
CREAT SERPL-MCNC: 0.67 MG/DL (ref 0.51–0.95)
DEPRECATED HCO3 PLAS-SCNC: 28 MMOL/L (ref 22–29)
ERYTHROCYTE [DISTWIDTH] IN BLOOD BY AUTOMATED COUNT: 12.3 % (ref 10–15)
GFR SERPL CREATININE-BSD FRML MDRD: 90 ML/MIN/1.73M2
GLUCOSE SERPL-MCNC: 102 MG/DL (ref 70–99)
HCT VFR BLD AUTO: 44.1 % (ref 35–47)
HGB BLD-MCNC: 14.3 G/DL (ref 11.7–15.7)
MCH RBC QN AUTO: 32.2 PG (ref 26.5–33)
MCHC RBC AUTO-ENTMCNC: 32.4 G/DL (ref 31.5–36.5)
MCV RBC AUTO: 99 FL (ref 78–100)
PLATELET # BLD AUTO: 233 10E3/UL (ref 150–450)
POTASSIUM SERPL-SCNC: 4.8 MMOL/L (ref 3.4–5.3)
RBC # BLD AUTO: 4.44 10E6/UL (ref 3.8–5.2)
SODIUM SERPL-SCNC: 142 MMOL/L (ref 136–145)
WBC # BLD AUTO: 6.8 10E3/UL (ref 4–11)

## 2022-09-19 PROCEDURE — 85027 COMPLETE CBC AUTOMATED: CPT | Performed by: PATHOLOGY

## 2022-09-19 PROCEDURE — 99204 OFFICE O/P NEW MOD 45 MIN: CPT | Performed by: NURSE PRACTITIONER

## 2022-09-19 PROCEDURE — 36415 COLL VENOUS BLD VENIPUNCTURE: CPT | Performed by: PATHOLOGY

## 2022-09-19 PROCEDURE — 80048 BASIC METABOLIC PNL TOTAL CA: CPT | Performed by: PATHOLOGY
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
## 2022-09-26 ENCOUNTER — ANESTHESIA EVENT (OUTPATIENT)
Dept: SURGERY | Facility: AMBULATORY SURGERY CENTER | Age: 77
End: 2022-09-26
Payer: COMMERCIAL

## 2022-09-27 ENCOUNTER — HOSPITAL ENCOUNTER (OUTPATIENT)
Facility: AMBULATORY SURGERY CENTER | Age: 77
Discharge: HOME | End: 2022-09-27
Attending: OPHTHALMOLOGY
Payer: COMMERCIAL

## 2022-09-27 ENCOUNTER — ANESTHESIA (OUTPATIENT)
Dept: SURGERY | Facility: AMBULATORY SURGERY CENTER | Age: 77
End: 2022-09-27
Payer: COMMERCIAL

## 2022-09-27 VITALS
SYSTOLIC BLOOD PRESSURE: 164 MMHG | DIASTOLIC BLOOD PRESSURE: 103 MMHG | WEIGHT: 155 LBS | RESPIRATION RATE: 16 BRPM | HEART RATE: 80 BPM | BODY MASS INDEX: 25.83 KG/M2 | TEMPERATURE: 97.1 F | HEIGHT: 65 IN | OXYGEN SATURATION: 98 %

## 2022-09-27 DIAGNOSIS — Z48.810 AFTERCARE FOLLOWING SURGERY OF A SENSE ORGAN: Primary | ICD-10-CM

## 2022-09-27 DIAGNOSIS — H25.12 NUCLEAR SCLEROSIS, LEFT: ICD-10-CM

## 2022-09-27 DIAGNOSIS — H31.8 CHOROIDAL LESION: ICD-10-CM

## 2022-09-27 PROCEDURE — 67028 INJECTION EYE DRUG: CPT | Mod: 50

## 2022-09-27 PROCEDURE — 66982 XCAPSL CTRC RMVL CPLX WO ECP: CPT | Mod: LT

## 2022-09-27 PROCEDURE — C9257 BEVACIZUMAB INJECTION: HCPCS

## 2022-09-27 PROCEDURE — 66982 XCAPSL CTRC RMVL CPLX WO ECP: CPT | Mod: LT | Performed by: OPHTHALMOLOGY

## 2022-09-27 PROCEDURE — 67028 INJECTION EYE DRUG: CPT | Mod: 50 | Performed by: OPHTHALMOLOGY

## 2022-09-27 DEVICE — EYE IMP IOL ALCON PCL SN60WF ACRYSOF IQ 21.0: Type: IMPLANTABLE DEVICE | Site: EYE | Status: FUNCTIONAL

## 2022-09-27 RX ORDER — FENTANYL CITRATE 50 UG/ML
INJECTION, SOLUTION INTRAMUSCULAR; INTRAVENOUS PRN
Status: DISCONTINUED | OUTPATIENT
Start: 2022-09-27 | End: 2022-09-27

## 2022-09-27 RX ORDER — TETRACAINE HYDROCHLORIDE 5 MG/ML
SOLUTION OPHTHALMIC PRN
Status: DISCONTINUED | OUTPATIENT
Start: 2022-09-27 | End: 2022-09-27 | Stop reason: HOSPADM

## 2022-09-27 RX ORDER — DEXAMETHASONE SODIUM PHOSPHATE 4 MG/ML
INJECTION, SOLUTION INTRA-ARTICULAR; INTRALESIONAL; INTRAMUSCULAR; INTRAVENOUS; SOFT TISSUE PRN
Status: DISCONTINUED | OUTPATIENT
Start: 2022-09-27 | End: 2022-09-27 | Stop reason: HOSPADM

## 2022-09-27 RX ORDER — PROPOFOL 10 MG/ML
INJECTION, EMULSION INTRAVENOUS CONTINUOUS PRN
Status: DISCONTINUED | OUTPATIENT
Start: 2022-09-27 | End: 2022-09-27

## 2022-09-27 RX ORDER — SODIUM CHLORIDE, SODIUM LACTATE, POTASSIUM CHLORIDE, CALCIUM CHLORIDE 600; 310; 30; 20 MG/100ML; MG/100ML; MG/100ML; MG/100ML
INJECTION, SOLUTION INTRAVENOUS CONTINUOUS
Status: DISCONTINUED | OUTPATIENT
Start: 2022-09-27 | End: 2022-09-28 | Stop reason: HOSPADM

## 2022-09-27 RX ORDER — ONDANSETRON 4 MG/1
4 TABLET, ORALLY DISINTEGRATING ORAL EVERY 30 MIN PRN
Status: DISCONTINUED | OUTPATIENT
Start: 2022-09-27 | End: 2022-09-28 | Stop reason: HOSPADM

## 2022-09-27 RX ORDER — ONDANSETRON 2 MG/ML
4 INJECTION INTRAMUSCULAR; INTRAVENOUS EVERY 30 MIN PRN
Status: DISCONTINUED | OUTPATIENT
Start: 2022-09-27 | End: 2022-09-28 | Stop reason: HOSPADM

## 2022-09-27 RX ORDER — PREDNISOLONE ACETATE 10 MG/ML
1-2 SUSPENSION/ DROPS OPHTHALMIC 4 TIMES DAILY
Qty: 15 ML | Refills: 0 | Status: SHIPPED | OUTPATIENT
Start: 2022-09-27

## 2022-09-27 RX ORDER — FENTANYL CITRATE 50 UG/ML
25 INJECTION, SOLUTION INTRAMUSCULAR; INTRAVENOUS EVERY 5 MIN PRN
Status: DISCONTINUED | OUTPATIENT
Start: 2022-09-27 | End: 2022-09-28 | Stop reason: HOSPADM

## 2022-09-27 RX ORDER — LIDOCAINE 40 MG/G
CREAM TOPICAL
Status: DISCONTINUED | OUTPATIENT
Start: 2022-09-27 | End: 2022-09-28 | Stop reason: HOSPADM

## 2022-09-27 RX ORDER — PROPARACAINE HYDROCHLORIDE 5 MG/ML
1 SOLUTION/ DROPS OPHTHALMIC ONCE
Status: COMPLETED | OUTPATIENT
Start: 2022-09-27 | End: 2022-09-27

## 2022-09-27 RX ORDER — CYCLOPENTOLAT/TROPIC/PHENYLEPH 1%-1%-2.5%
1 DROPS (EA) OPHTHALMIC (EYE)
Status: COMPLETED | OUTPATIENT
Start: 2022-09-27 | End: 2022-09-27

## 2022-09-27 RX ORDER — PROPOFOL 10 MG/ML
INJECTION, EMULSION INTRAVENOUS PRN
Status: DISCONTINUED | OUTPATIENT
Start: 2022-09-27 | End: 2022-09-27

## 2022-09-27 RX ORDER — MOXIFLOXACIN 5 MG/ML
1 SOLUTION/ DROPS OPHTHALMIC 3 TIMES DAILY
Qty: 3 ML | Refills: 0 | Status: SHIPPED | OUTPATIENT
Start: 2022-09-27

## 2022-09-27 RX ORDER — MEPERIDINE HYDROCHLORIDE 25 MG/ML
12.5 INJECTION INTRAMUSCULAR; INTRAVENOUS; SUBCUTANEOUS
Status: DISCONTINUED | OUTPATIENT
Start: 2022-09-27 | End: 2022-09-28 | Stop reason: HOSPADM

## 2022-09-27 RX ORDER — HYDROMORPHONE HYDROCHLORIDE 1 MG/ML
0.2 INJECTION, SOLUTION INTRAMUSCULAR; INTRAVENOUS; SUBCUTANEOUS EVERY 5 MIN PRN
Status: DISCONTINUED | OUTPATIENT
Start: 2022-09-27 | End: 2022-09-28 | Stop reason: HOSPADM

## 2022-09-27 RX ORDER — BALANCED SALT SOLUTION 6.4; .75; .48; .3; 3.9; 1.7 MG/ML; MG/ML; MG/ML; MG/ML; MG/ML; MG/ML
SOLUTION OPHTHALMIC PRN
Status: DISCONTINUED | OUTPATIENT
Start: 2022-09-27 | End: 2022-09-27 | Stop reason: HOSPADM

## 2022-09-27 RX ORDER — LIDOCAINE HYDROCHLORIDE 20 MG/ML
INJECTION, SOLUTION INFILTRATION; PERINEURAL PRN
Status: DISCONTINUED | OUTPATIENT
Start: 2022-09-27 | End: 2022-09-27

## 2022-09-27 RX ORDER — OXYCODONE HYDROCHLORIDE 5 MG/1
5 TABLET ORAL EVERY 4 HOURS PRN
Status: DISCONTINUED | OUTPATIENT
Start: 2022-09-27 | End: 2022-09-28 | Stop reason: HOSPADM

## 2022-09-27 RX ORDER — FENTANYL CITRATE 50 UG/ML
25 INJECTION, SOLUTION INTRAMUSCULAR; INTRAVENOUS
Status: DISCONTINUED | OUTPATIENT
Start: 2022-09-27 | End: 2022-09-28 | Stop reason: HOSPADM

## 2022-09-27 RX ORDER — GLYCOPYRROLATE 0.2 MG/ML
INJECTION, SOLUTION INTRAMUSCULAR; INTRAVENOUS PRN
Status: DISCONTINUED | OUTPATIENT
Start: 2022-09-27 | End: 2022-09-27

## 2022-09-27 RX ADMIN — Medication 1 DROP: at 09:37

## 2022-09-27 RX ADMIN — Medication 100 MCG: at 10:53

## 2022-09-27 RX ADMIN — LIDOCAINE HYDROCHLORIDE 60 MG: 20 INJECTION, SOLUTION INFILTRATION; PERINEURAL at 09:53

## 2022-09-27 RX ADMIN — PROPOFOL 130 MCG/KG/MIN: 10 INJECTION, EMULSION INTRAVENOUS at 09:34

## 2022-09-27 RX ADMIN — Medication 1 DROP: at 08:19

## 2022-09-27 RX ADMIN — FENTANYL CITRATE 25 MCG: 50 INJECTION, SOLUTION INTRAMUSCULAR; INTRAVENOUS at 12:20

## 2022-09-27 RX ADMIN — SODIUM CHLORIDE, SODIUM LACTATE, POTASSIUM CHLORIDE, CALCIUM CHLORIDE: 600; 310; 30; 20 INJECTION, SOLUTION INTRAVENOUS at 09:47

## 2022-09-27 RX ADMIN — PROPOFOL 100 MCG/KG/MIN: 10 INJECTION, EMULSION INTRAVENOUS at 09:56

## 2022-09-27 RX ADMIN — Medication 1 DROP: at 08:21

## 2022-09-27 RX ADMIN — PROPARACAINE HYDROCHLORIDE 1 DROP: 5 SOLUTION/ DROPS OPHTHALMIC at 08:19

## 2022-09-27 RX ADMIN — GLYCOPYRROLATE 0.2 MG: 0.2 INJECTION, SOLUTION INTRAMUSCULAR; INTRAVENOUS at 11:00

## 2022-09-27 RX ADMIN — PROPOFOL 150 MG: 10 INJECTION, EMULSION INTRAVENOUS at 09:55

## 2022-09-27 RX ADMIN — FENTANYL CITRATE 25 MCG: 50 INJECTION, SOLUTION INTRAMUSCULAR; INTRAVENOUS at 10:05

## 2022-09-27 RX ADMIN — Medication 1 DROP: at 08:20

## 2022-09-27 RX ADMIN — Medication 50 MCG: at 10:26

## 2022-09-27 NOTE — BRIEF OP NOTE
[facility]    Brief Operative Note    Pre-operative diagnosis: Nuclear sclerosis, left [H25.12]  Choroidal lesion [H31.8]  Post-operative diagnosis Same as pre-operative diagnosis    Procedure: Procedure(s):  COMPLEX LEFT EYE PHACOEMULSIFICATION, CATARACT, WITH INTRAOCULAR LENS IMPLANT  BILATERAL EXAM UNDER ANESTHESIA, EYE  BILATERAL  INJECTION, PHARMACOLOGIC AGENT, INTRAVITREAL AVASTIN  Surgeon: Surgeon(s) and Role:     * Mary Chacko MD - Primary     * Jaiden Lopes MD - Fellow - Assisting  Anesthesia: General   Estimated Blood Loss: None    Drains: None  Specimens: * No specimens in log *  Findings:   None.  Complications: None.  Implants:   Implant Name Type Inv. Item Serial No.  Lot No. LRB No. Used Action   EYE IMP IOL TISHA PCL [id number] ACRYSOF IQ 21.0 - [id number] Lens/Eye Implant EYE IMP IOL TISHA PCL [id number] ACRYSOF IQ 21.0 [id number] [employer]  Left 1 Implanted

## 2022-09-27 NOTE — OP NOTE
SURGEON:   PANCHO ROSAS MD  Assistant surgeon: Sherie Mckeon MD    PREOPERATIVE DIAGNOSIS:   1. visually significant dense cataract OS,   2. Poor pupil dilation  3. retinal exudates both eyes    POSTOPERATIVE DIAGNOSIS: same  NAME OF THE PROCEDURE: Phacoemulsification with intraocular lens implantation  ANESTHESIA: Monitored anesthesia care and retrobulbar block   COMPLICATIONS: none  INDICATIONS:   Home Phone [phone]   Work Phone Not on file.   Mobile [phone]    is a 77 year old with diagnosis of visually significant dense cataract, here for cataract surgery.    DESCRIPTION OF THE PROCEDURE:  The patient was taken to the operative room where general anesthesia was administered and a block consisting of a 1:1 mixture of 2%lidocaine and 0.75% marcaine with epinephrine and wydase, was administered to the operative eye with adequate anesthesia and akinesia.      The operative eye was prepped and draped in the usual sterile surgical fashion for ophthalmic surgery, including the installation of one drop of 5% Povidone Iodine.  A sterile drape was placed over the face and body and a lid speculum was inserted.      With the use of a Supersharp blade and 0.12 forceps, a paracentesis was created at the 5 o'clock position, and balanced salt solution with epinephrine was injected into the anterior chamber.  Trypan blue was injected into the AC, this was flushed out of the eye with balanced salt solution.   Viscoelastic was then injected into the anterior chamber using a canula.  A 2.5 mm keratome was then used to construct a clear corneal incision at the 1 o'clock position.  A 7.0 mm Malyugin ring was inserted into the AC and positioned to dilate the pupil.  Using Utrata forceps and cystotome needle, a continuous curvilinear capsulorrhexis was created and hydrodissection was undertaken with the use of BSS.  The nucleus was found to be freely mobile and then removed by phacoemulsification using a stop and chop  technique.  The remaining elements of cortex were then removed with irrigation/aspiration.  An IOL,was injected into the capsular bag and was rotated into a good position The remaining elements of viscoelastic were then removed with irrigation/aspiration. The malyugin ring was removed from the eye using the manipulator after being disengaged from the iris.  The wounds were hydrodissect and were watertight.    subconjunctival injection of Dexamethasone and Ancef were administered. The lid speculum was removed.     Dilated fundus exam was done utilizing the indirect ophthalmoscope and a 28D lens in a sterile manner OS. Extensive exudation of the macula was noted with subretinal heme peripapillary. Intravitreal injection of 0.05mL of Avastin was given OS.     Next the right eye was examined in a non-sterile manner. Extensive exudation was also noted. After placing 5% betadine solution on the, intravitreal injection of 0.05mL of Avastin was given OD.     The eye was cleaned with wet and dry gauze. Maxitrol ointment was placed on the eye.  A patch and clear shield were placed over the eye.  The patient was discharge in stable condition having tolerated the procedure well.        Implant Name Type Inv. Item Serial No.  Lot No. LRB No. Used Action   EYE IMP IOL TISHA PCL [id number] ACRYSOF IQ 21.0 - [id number] Lens/Eye Implant EYE IMP IOL TISHA PCL [id number] ACRYSOF IQ 21.0 [id number] [employer]  Left 1 Implanted     The surgery was assisted by Dr. Sherie Mckeon. Due to the delicate and complex nature of this surgery, an assistant was required. He assisted with Cataract extraction intraocular lens. I was present for the entire surgery.

## 2022-09-27 NOTE — ANESTHESIA PREPROCEDURE EVALUATION
Anesthesia Pre-Procedure Evaluation    Patient: Ban Petersen   MRN: [id number] : 1945        Procedure : Procedure(s):  COMPLEX LEFT EYE PHACOEMULSIFICATION, CATARACT, WITH INTRAOCULAR LENS IMPLANT  BILATERAL EXAM UNDER ANESTHESIA, EYE  BILATERAL  INJECTION, PHARMACOLOGIC AGENT, INTRAVITREAL AVASTIN          Past Medical History:   Diagnosis Date     Hemiplegia, unspecified, affecting unspecified side      Other convulsions      Seizure (H)      Seizures (H)       Past Surgical History:   Procedure Laterality Date     CATARACT IOL, RT/LT       CHOLECYSTECTOMY       ENDOSCOPIC RETROGRADE CHOLANGIOPANCREATOGRAM WITH DIRECT VISUALIZATION SYSTEM AND GENERATOR  2011    Procedure:ENDOSCOPIC RETROGRADE CHOLANGIOPANCREATOGRAM WITH DIRECT VISUALIZATION SYSTEM AND GENERATOR; with Spyglass, Exchange of Bile Duct Stent, Removal of Bile Duct Stone ; Surgeon:PAIGE MAHER; Location:[facility]     ENDOSCOPIC RETROGRADE CHOLANGIOPANCREATOGRAM WITH SPYGLASS  2011    Procedure:ENDOSCOPIC RETROGRADE CHOLANGIOPANCREATOGRAM WITH SPYGLASS; Biliary Spincterotomy, Endo retrograde insertion of stent into bile duct, Endo retrograde balloon dilation of bilary ducts, Electryhydraulic Lithotripsy; Surgeon:PAIGE MAHER; Location:[facility]     ESOPHAGOSCOPY, GASTROSCOPY, DUODENOSCOPY (EGD), COMBINED  2011    Procedure:COMBINED ESOPHAGOSCOPY, GASTROSCOPY, DUODENOSCOPY (EGD), REMOVE FOREIGN BODY; Surgeon:PAIGE MAHER; Location:[facility]     gall stone  2011    only took out gallstone, not gallbladder     ZZC NONSPECIFIC PROCEDURE      Cholecystectomy      No Known Allergies   Social History     Tobacco Use     Smoking status: Former Smoker     Packs/day: 1.50     Years: 19.00     Pack years: 28.50     Types: Cigarettes     Quit date: 10/22/1985     Years since quittin.9     Smokeless tobacco: Never Used   Substance Use Topics     Alcohol use: No      Wt Readings from Last 1 Encounters:   22 70.3 kg (155 lb)         Anesthesia Evaluation            ROS/MED HX  ENT/Pulmonary:       Neurologic: Comment: Hemiplegia  Seizure Disorder  Cognitive decline s/p TBI      Cardiovascular:     (+) hypertension-----    METS/Exercise Tolerance:     Hematologic:       Musculoskeletal:       GI/Hepatic:       Renal/Genitourinary:       Endo:       Psychiatric/Substance Use:       Infectious Disease:       Malignancy:       Other:               OUTSIDE LABS:  CBC:   Lab Results   Component Value Date    WBC 6.8 09/19/2022    WBC 6.3 06/10/2022    HGB 14.3 09/19/2022    HGB 14.7 06/10/2022    HCT 44.1 09/19/2022    HCT 44.9 06/10/2022     09/19/2022     06/10/2022     BMP:   Lab Results   Component Value Date     09/19/2022     09/20/2021    POTASSIUM 4.8 09/19/2022    POTASSIUM 4.7 09/20/2021    CHLORIDE 105 09/19/2022    CHLORIDE 108 09/20/2021    CO2 28 09/19/2022    CO2 29 09/20/2021    BUN 11.9 09/19/2022    BUN 13 09/20/2021    CR 0.67 09/19/2022    CR 0.71 09/20/2021     (H) 09/19/2022    GLC 86 09/20/2021     COAGS:   Lab Results   Component Value Date    INR 0.89 11/07/2012     POC:   Lab Results   Component Value Date     (H) 11/05/2012     HEPATIC:   Lab Results   Component Value Date    ALBUMIN 3.6 09/20/2021    PROTTOTAL 6.6 (L) 09/20/2021    ALT 28 09/20/2021    AST 20 09/20/2021    ALKPHOS 109 09/20/2021    BILITOTAL 0.4 09/20/2021     OTHER:   Lab Results   Component Value Date    A1C 5.0 11/03/2012    MILA 10.0 09/19/2022    PHOS 3.5 05/02/2011    MAG 2.1 05/02/2011    LIPASE 93 05/02/2011    AMYLASE 33 05/02/2011    TSH 2.49 02/14/2020    CRP 23.0 (H) 11/12/2011    SED 12 11/12/2011       Anesthesia Plan    ASA Status:  3      Anesthesia Type: General.     - Airway: LMA   Induction: Intravenous.   Maintenance: TIVA.        Consents    Anesthesia Plan(s) and associated risks, benefits, and realistic alternatives discussed. Questions answered and patient/representative(s) expressed  understanding.    - Discussed:     - Discussed with:  Patient         Postoperative Care    Pain management: Multi-modal analgesia.   PONV prophylaxis: Background Propofol Infusion, Ondansetron (or other 5HT-3)     Comments:                Viviana Euceda MD

## 2022-09-27 NOTE — DISCHARGE INSTRUCTIONS
Dr. Mary Chacko  [facility]  [phone]  Post Operative Cataract Instructions    If you have a gauze eye patch on, please do not remove it until it is removed by your physician at your first post-operative visit.  You will start your eye drops the next day.    Wear the clear eye shield when sleeping for protection for 5 days.    Do not rub the operated eye.    Light sensitivity may be noticed. Sunglasses may be worn for comfort.    Some discomfort and irritation may be noticed. Acetaminophen (Tylenol) or Ibuprofen (Advil) may be taken for discomfort. If pain persists please call Dr. Chacko's office.    Keep the operated eye dry. You may wash your hair, bathe or shower, but keep the operated eye closed while doing so.     If you take glaucoma medications, bring them with you to the clinic on your first post operative visit.    Bring your prescribed eye drops with you to your scheduled post-operative appointment.    Use medication exactly as prescribed by your doctor. You may restart your regular home medications.     Call Dr. Chacko's office at [phone] if any of the following should occur:    Any sudden vision changes, including decreased vision  Nausea or severe headache  Increase in pain not controlled  Signs of infection (pus, increasing redness or tenderness)  Severe sensitivity to light     [facility]  Home Care Following Anesthesia  For 24 hours after surgery:  Get plenty of rest.  A responsible adult must stay with you for at least 24 hours after you leave the surgery center.  Do not drive or use heavy equipment.  If you have weakness or tingling, don't drive or use heavy equipment until this feeling goes away.   Do not drink alcohol.   Avoid strenuous or risky activities.  Ask for help when climbing stairs.  You may feel lightheaded.  IF so, sit for a few minutes before standing.  Have someone help you get up.   If you have nausea (feel sick  to your stomach): Drink only clear liquids such as apple juice, ginger ale, broth or 7-Up.  Rest may also help.  Be sure to drink enough fluids.  Move to a regular diet as you feel able.   You may have a slight fever.  Call the doctor if your fever is over 100 F (37.7 C) (taken under the tongue) or lasts longer than 24 hours.  You may have a dry mouth, a sore throat, muscle aches or trouble sleeping. These should go away after 24 hours.  Do not make important or legal decisions.   It is recommended to avoid smoking.               Tips for taking pain medications  To get the best pain relief possible, remember these points:  Take pain medications as directed, before pain becomes severe.  Pain medication can upset your stomach: taking it with food may help.  Constipation is a common side effect of pain medication. Drink plenty of  fluids.  Eat foods high in fiber. Take a stool softener if recommended by your doctor or pharmacist.  Do not drink alcohol, drive or operate machinery while taking pain medications.  Ask about other ways to control pain, such as with heat, ice or relaxation.    Tylenol/Acetaminophen Consumption  To help encourage the safe use of acetaminophen, the makers of TYLENOL  have lowered the maximum daily dose for single-ingredient Extra Strength TYLENOL  (acetaminophen) products sold in the [place] from 8 pills per day (4,000 mg) to 6 pills per day (3,000 mg). The dosing interval has also changed from 2 pills every 4-6 hours to 2 pills every 6 hours.  If you feel your pain relief is insufficient, you may take Tylenol/Acetaminophen in addition to your narcotic pain medication.   Be careful not to exceed 3,000 mg of Tylenol/Acetaminophen in a 24 hour period from all sources.  If you are taking extra strength Tylenol/acetaminophen (500 mg), the maximum dose is 6 tablets in 24 hours.  If you are taking regular strength acetaminophen (325 mg), the maximum dose is 9 tablets in 24 hours.    Call a doctor for  any of the following:  Signs of infection (fever, growing tenderness at the surgery site, a large amount of drainage or bleeding, severe pain, foul-smelling drainage, redness, swelling).  It has been over 8 to 10 hours since surgery and you are still not able to urinate (pass water).  Headache for over 24 hours.  Signs of Covid-19 infection (temperature over 100 degrees, shortness of breath, cough, loss of taste/smell, generalized body aches, persistent headache, chills, sore throat, nausea/vomiting/diarrhea)  Your doctor is:       Dr. Mary Chacko, Ophthalmology: [phone]               Or dial [phone] and ask for the resident on call for:  Ophthalmology  For emergency care, call the:  [place] [facility]:  [phone] (TTY for hearing impaired: [phone])

## 2022-09-27 NOTE — ANESTHESIA POSTPROCEDURE EVALUATION
Patient: Ban Petersen    Procedure: Procedure(s):  COMPLEX LEFT EYE PHACOEMULSIFICATION, CATARACT, WITH INTRAOCULAR LENS IMPLANT  BILATERAL EXAM UNDER ANESTHESIA, EYE  BILATERAL  INJECTION, PHARMACOLOGIC AGENT, INTRAVITREAL AVASTIN       Anesthesia Type:  General    Note:  Disposition: Outpatient   Postop Pain Control: Uneventful            Sign Out: Well controlled pain   PONV: No   Neuro/Psych: Uneventful            Sign Out: Acceptable/Baseline neuro status   Airway/Respiratory: Uneventful            Sign Out: AIRWAY IN SITU/Resp. Support   CV/Hemodynamics: Uneventful            Sign Out: Acceptable CV status; No obvious hypovolemia; No obvious fluid overload   Other NRE: NONE   DID A NON-ROUTINE EVENT OCCUR? No           Last vitals:  Vitals Value Taken Time   /99 09/27/22 1145   Temp 36  C (96.8  F) 09/27/22 1137   Pulse 85 09/27/22 1149   Resp 17 09/27/22 1149   SpO2 99 % 09/27/22 1149   Vitals shown include unvalidated device data.    Electronically Signed By: Viviana Euceda MD  September 27, 2022  11:49 AM

## 2022-09-27 NOTE — ANESTHESIA CARE TRANSFER NOTE
Patient: Bna Petersen    Procedure: Procedure(s):  COMPLEX LEFT EYE PHACOEMULSIFICATION, CATARACT, WITH INTRAOCULAR LENS IMPLANT  BILATERAL EXAM UNDER ANESTHESIA, EYE  BILATERAL  INJECTION, PHARMACOLOGIC AGENT, INTRAVITREAL AVASTIN       Diagnosis: Nuclear sclerosis, left [H25.12]  Choroidal lesion [H31.8]  Diagnosis Additional Information: No value filed.    Anesthesia Type:   General     Note:    Oropharynx: oropharynx clear of all foreign objects and spontaneously breathing  Level of Consciousness: awake and drowsy  Oxygen Supplementation: face mask  Level of Supplemental Oxygen (L/min / FiO2): 4  Independent Airway: airway patency satisfactory and stable  Dentition: dentition unchanged  Vital Signs Stable: post-procedure vital signs reviewed and stable  Report to RN Given: handoff report given  Patient transferred to: PACU    Handoff Report: Identifed the Patient, Identified the Reponsible Provider, Reviewed the pertinent medical history, Discussed the surgical course, Reviewed Intra-OP anesthesia mangement and issues during anesthesia, Set expectations for post-procedure period and Allowed opportunity for questions and acknowledgement of understanding      Vitals:  Vitals Value Taken Time   /101 09/27/22 1137   Temp 36  C (96.8  F) 09/27/22 1137   Pulse 92 09/27/22 1139   Resp 17 09/27/22 1139   SpO2 99 % 09/27/22 1139   Vitals shown include unvalidated device data.    Electronically Signed By: MILES Hurley CRNA  September 27, 2022  11:41 AM

## 2022-09-28 ENCOUNTER — OFFICE VISIT (OUTPATIENT)
Dept: OPHTHALMOLOGY | Facility: CLINIC | Age: 77
End: 2022-09-28
Attending: OPHTHALMOLOGY
Payer: COMMERCIAL

## 2022-09-28 DIAGNOSIS — Z48.810 AFTERCARE FOLLOWING SURGERY OF A SENSORY ORGAN: Primary | ICD-10-CM

## 2022-09-28 PROCEDURE — 99024 POSTOP FOLLOW-UP VISIT: CPT | Performed by: OPHTHALMOLOGY

## 2022-09-28 PROCEDURE — G0463 HOSPITAL OUTPT CLINIC VISIT: HCPCS

## 2022-09-28 ASSESSMENT — SLIT LAMP EXAM - LIDS
COMMENTS: NORMAL
COMMENTS: NORMAL

## 2022-09-28 ASSESSMENT — VISUAL ACUITY
OS_SC: HM
METHOD: SNELLEN - LINEAR
OD_SC: 20/250

## 2022-09-28 ASSESSMENT — TONOMETRY
OD_IOP_MMHG: 06
IOP_METHOD: ICARE
OS_IOP_MMHG: 06

## 2022-09-28 ASSESSMENT — EXTERNAL EXAM - LEFT EYE: OS_EXAM: NORMAL

## 2022-09-28 ASSESSMENT — EXTERNAL EXAM - RIGHT EYE: OD_EXAM: NORMAL

## 2022-09-28 NOTE — PATIENT INSTRUCTIONS
Position: any   No heavy lifting   Muir shield at all times  Retina detachment and endophthalmitis precautions were discussed with the patient and was asked to return if any of the those occur    Medications to operative eye  Predforte  (white top) four times a day    ofloxacin (Tan top) four times a day    Eyedrops 5 min appart

## 2022-09-28 NOTE — NURSING NOTE
Chief Complaints and History of Present Illnesses   Patient presents with     Post Op (Ophthalmology) Left Eye     Chief Complaint(s) and History of Present Illness(es)     Post Op (Ophthalmology) Left Eye               Comments     One day POP complex CE/IOL left eye.    The patient has not eye pain.  Shanda Carpio, COA, COA 8:56 AM 09/28/2022

## 2022-09-28 NOTE — PROGRESS NOTES
Postoperative day 1 status post Cataract extraction intraocular lens left eye   Avastin inj both eyes     Slept well  Intraocular lens in good position  Doing well    Plan:  Position: any   No heavy lifting   Muir shield at all times  Retina detachment and endophthalmitis precautions were discussed with the patient and was asked to return if any of the those occur    Medications to operative eye  Predforte  (white top) four times a day    ofloxacin (Tan top) four times a day    Eyedrops 5 min appart     Follow up in one week  Will need Avastin inj Q4 weeks  ~~~~~~~~~~~~~~~~~~~~~~~~~~~~~~~~~~   Complete documentation of historical and exam elements from today's encounter can be found in the full encounter summary report (not reduplicated in this progress note).  I personally obtained the chief complaint(s) and history of present illness.  I confirmed and edited as necessary the review of systems, past medical/surgical history, family history, social history, and examination findings as documented by others; and I examined the patient myself.  I personally reviewed the relevant tests, images, and reports as documented above.  I formulated and edited as necessary the assessment and plan and discussed the findings and management plan with the patient and family    Mary Chacko MD  .  Retina Service   Department of Ophthalmology and Visual Neurosciences   [facility]  Phone: [phone]   Fax: [phone]

## 2022-10-05 ENCOUNTER — OFFICE VISIT (OUTPATIENT)
Dept: OPHTHALMOLOGY | Facility: CLINIC | Age: 77
End: 2022-10-05
Attending: OPHTHALMOLOGY
Payer: COMMERCIAL

## 2022-10-05 DIAGNOSIS — Z48.810 AFTERCARE FOLLOWING SURGERY OF A SENSORY ORGAN: Primary | ICD-10-CM

## 2022-10-05 PROCEDURE — 99024 POSTOP FOLLOW-UP VISIT: CPT | Performed by: OPHTHALMOLOGY

## 2022-10-05 PROCEDURE — G0463 HOSPITAL OUTPT CLINIC VISIT: HCPCS

## 2022-10-05 ASSESSMENT — TONOMETRY
OS_IOP_MMHG: 13
IOP_METHOD: TONOPEN
OD_IOP_MMHG: 16

## 2022-10-05 ASSESSMENT — SLIT LAMP EXAM - LIDS
COMMENTS: NORMAL
COMMENTS: NORMAL

## 2022-10-05 ASSESSMENT — VISUAL ACUITY
CORRECTION_TYPE: GLASSES
OS_CC: LP
OD_CC: 20/250
METHOD: SNELLEN - LINEAR

## 2022-10-05 ASSESSMENT — EXTERNAL EXAM - RIGHT EYE: OD_EXAM: NORMAL

## 2022-10-05 ASSESSMENT — EXTERNAL EXAM - LEFT EYE: OS_EXAM: NORMAL

## 2022-10-05 NOTE — PATIENT INSTRUCTIONS
Position: any   Muir shield or prescription  at all times  Retina detachment and endophthalmitis precautions were discussed with the patient and was asked to return if any of the those occur    Medications to operative eye  Predforte  (white top)Three times a day x 1 week, then twice a day x 1 week and then once a day till finish  ofloxacin (Tan top) Three times a day x 1 week and stop     Eyedrops 5 min appart

## 2022-10-05 NOTE — NURSING NOTE
Chief Complaints and History of Present Illnesses   Patient presents with     Post Op (Ophthalmology) Left Eye     Chief Complaint(s) and History of Present Illness(es)     Post Op (Ophthalmology) Left Eye     Laterality: left eye    Associated symptoms: Negative for eye pain, flashes and floaters    Treatments tried: eye drops              Comments     Here for post op CE/IOL left eye. No eye pain. Compliant with drops.    Peng Castillo COT 10:49 AM October 5, 2022

## 2022-10-05 NOTE — PROGRESS NOTES
Postoperative w1 status post Cataract extraction intraocular lens left eye 9.27.22  Avastin inj both eyes     Intraocular lens in good position  Doing well    Plan:  Position: any   Muir shield or prescription  at all times  Retina detachment and endophthalmitis precautions were discussed with the patient and was asked to return if any of the those occur    Medications to operative eye  Predforte  (white top)Three times a day x 1 week, then twice a day x 1 week and then once a day till finish  ofloxacin (Tan top) Three times a day x 1 week and stop     Eyedrops 5 min appart     Follow up in 3  Weeks for avastin both eyes; Optical Coherence Tomography/optos  if possible  Will continue Avastin inj Q4 weeks for Age related macular degeneration/nvm with disciform scarsd  ~~~~~~~~~~~~~~~~~~~~~~~~~~~~~~~~~~   Complete documentation of historical and exam elements from today's encounter can be found in the full encounter summary report (not reduplicated in this progress note).  I personally obtained the chief complaint(s) and history of present illness.  I confirmed and edited as necessary the review of systems, past medical/surgical history, family history, social history, and examination findings as documented by others; and I examined the patient myself.  I personally reviewed the relevant tests, images, and reports as documented above.  I formulated and edited as necessary the assessment and plan and discussed the findings and management plan with the patient and family    Mary Chacko MD  Professor of Ophthalmology  Vitreo-Retinal surgeon   Department of Ophthalmology and Visual Neurosciences   [facility]  Phone: [phone]   Fax: [phone]

## 2022-10-12 DIAGNOSIS — H35.351 MACULAR EDEMA, CYSTOID, RIGHT: Primary | ICD-10-CM

## 2022-10-27 ENCOUNTER — OFFICE VISIT (OUTPATIENT)
Dept: OPHTHALMOLOGY | Facility: CLINIC | Age: 77
End: 2022-10-27
Attending: OPHTHALMOLOGY
Payer: COMMERCIAL

## 2022-10-27 DIAGNOSIS — H35.3221 EXUDATIVE AGE-RELATED MACULAR DEGENERATION OF LEFT EYE WITH ACTIVE CHOROIDAL NEOVASCULARIZATION (H): Primary | ICD-10-CM

## 2022-10-27 DIAGNOSIS — H35.351 MACULAR EDEMA, CYSTOID, RIGHT: ICD-10-CM

## 2022-10-27 PROCEDURE — 92134 CPTRZ OPH DX IMG PST SGM RTA: CPT | Performed by: OPHTHALMOLOGY

## 2022-10-27 PROCEDURE — 92250 FUNDUS PHOTOGRAPHY W/I&R: CPT | Performed by: OPHTHALMOLOGY

## 2022-10-27 PROCEDURE — G0463 HOSPITAL OUTPT CLINIC VISIT: HCPCS | Mod: 25

## 2022-10-27 PROCEDURE — 250N000009 HC RX 250: Performed by: OPHTHALMOLOGY

## 2022-10-27 PROCEDURE — 99207 FUNDUS PHOTOS OU (BOTH EYES): CPT | Mod: 26 | Performed by: OPHTHALMOLOGY

## 2022-10-27 PROCEDURE — 250N000011 HC RX IP 250 OP 636: Performed by: OPHTHALMOLOGY

## 2022-10-27 PROCEDURE — 92015 DETERMINE REFRACTIVE STATE: CPT

## 2022-10-27 PROCEDURE — 67028 INJECTION EYE DRUG: CPT | Mod: LT | Performed by: OPHTHALMOLOGY

## 2022-10-27 RX ORDER — LIDOCAINE HYDROCHLORIDE 20 MG/ML
0.5 INJECTION, SOLUTION EPIDURAL; INFILTRATION; INTRACAUDAL; PERINEURAL
Status: ACTIVE | OUTPATIENT
Start: 2022-10-28

## 2022-10-27 RX ORDER — LIDOCAINE HYDROCHLORIDE 20 MG/ML
5 INJECTION, SOLUTION EPIDURAL; INFILTRATION; INTRACAUDAL; PERINEURAL
Status: ACTIVE | OUTPATIENT
Start: 2022-10-27 | End: 2023-01-01

## 2022-10-27 RX ADMIN — Medication 1.25 MG: at 10:18

## 2022-10-27 RX ADMIN — LIDOCAINE HYDROCHLORIDE 0.5 ML: 20 INJECTION, SOLUTION EPIDURAL; INFILTRATION; INTRACAUDAL; PERINEURAL at 10:19

## 2022-10-27 ASSESSMENT — VISUAL ACUITY
OS_CC: HM
OD_CC: CF
METHOD: SNELLEN - LINEAR
OS_SC: HM

## 2022-10-27 ASSESSMENT — REFRACTION_MANIFEST
OS_SPHERE: -2.00
OD_CYLINDER: +1.25
OD_SPHERE: -2.25
OS_CYLINDER: +2.00
METHOD_AUTOREFRACTION: 1
OD_AXIS: 180
OS_AXIS: 011

## 2022-10-27 ASSESSMENT — CONF VISUAL FIELD
OS_INFERIOR_TEMPORAL_RESTRICTION: 1
OS_INFERIOR_NASAL_RESTRICTION: 1
OS_SUPERIOR_TEMPORAL_RESTRICTION: 1
OS_SUPERIOR_NASAL_RESTRICTION: 1

## 2022-10-27 ASSESSMENT — EXTERNAL EXAM - RIGHT EYE: OD_EXAM: NORMAL

## 2022-10-27 ASSESSMENT — TONOMETRY
IOP_METHOD: ICARE
OS_IOP_MMHG: 8
OD_IOP_MMHG: 9

## 2022-10-27 ASSESSMENT — SLIT LAMP EXAM - LIDS
COMMENTS: NORMAL
COMMENTS: NORMAL

## 2022-10-27 ASSESSMENT — EXTERNAL EXAM - LEFT EYE: OS_EXAM: NORMAL

## 2022-10-27 NOTE — NURSING NOTE
Chief Complaints and History of Present Illnesses   Patient presents with     Post Op (Ophthalmology) Left Eye     COMPLEX LEFT EYE PHACOEMULSIFICATION, CATARACT, WITH INTRAOCULAR LENS IMPLANT - DOS 9/27/22     Chief Complaint(s) and History of Present Illness(es)     Post Op (Ophthalmology) Left Eye            Laterality: left eye    Associated symptoms: Negative for eye pain, flashes and floaters    Treatments tried: eye drops    Pain scale: 0/10    Comments: COMPLEX LEFT EYE PHACOEMULSIFICATION, CATARACT, WITH INTRAOCULAR LENS IMPLANT - DOS 9/27/22          Comments    Pt here today for 1 month s/p KPE c IOL LE DOS 9/27/2022  States no pain or discomfort - healing is going well.  Pt's brother Toan here today with pt.  Toan reports that they are following drops regimen as directed.    Brain BENNETT, DWAYNE 8:43 AM 10/27/2022

## 2022-10-27 NOTE — PROGRESS NOTES
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 VPT less likely given FA findings, US rules out osteoma    - may benefit from trial of anti-VEGF left eye     # Pseudophakia both eyes   Intraocular lens in good position  Doing well  With posterior capsular opacity (PCO)  observe    Plan:  avastin inj left eye 10/27/22   Retina detachment and endophthalmitis precautions were discussed with the patient and family who was asked to return if any of the those occur  Medications to left eye: none  Follow up in 5  Weeks for Avastin inj left eye only   Could consider avastin inj both eyes in the future. Will evaluate avastin response to left eye      ~~~~~~~~~~~~~~~~~~~~~~~~~~~~~~~~~~   Complete documentation of historical and exam elements from today's encounter can be found in the full encounter summary report (not reduplicated in this progress note).  I personally obtained the chief complaint(s) and history of present illness.  I confirmed and edited as necessary the review of systems, past  medical/surgical history, family history, social history, and examination findings as documented by others; and I examined the patient myself.  I personally reviewed the relevant tests, images, and reports as documented above.  I formulated and edited as necessary the assessment and plan and discussed the findings and management plan with the patient and family and No resident or fellow assisted with the procedures performed.  I performed the procedures myself.    Mary Chacko MD  Professor of Ophthalmology  Vitreo-Retinal surgeon   Department of Ophthalmology and Visual Neurosciences   [facility]  Phone: [phone]   Fax: [phone]

## 2022-11-17 ENCOUNTER — OFFICE VISIT (OUTPATIENT)
Dept: OPHTHALMOLOGY | Facility: CLINIC | Age: 77
End: 2022-11-17
Attending: OPHTHALMOLOGY
Payer: COMMERCIAL

## 2022-11-17 DIAGNOSIS — H35.351 MACULAR EDEMA, CYSTOID, RIGHT: ICD-10-CM

## 2022-11-17 DIAGNOSIS — H35.3221 EXUDATIVE AGE-RELATED MACULAR DEGENERATION OF LEFT EYE WITH ACTIVE CHOROIDAL NEOVASCULARIZATION (H): Primary | ICD-10-CM

## 2022-11-17 PROCEDURE — 250N000009 HC RX 250: Performed by: OPHTHALMOLOGY

## 2022-11-17 PROCEDURE — 92134 CPTRZ OPH DX IMG PST SGM RTA: CPT | Performed by: OPHTHALMOLOGY

## 2022-11-17 PROCEDURE — 67028 INJECTION EYE DRUG: CPT | Mod: LT | Performed by: OPHTHALMOLOGY

## 2022-11-17 PROCEDURE — 250N000011 HC RX IP 250 OP 636: Performed by: OPHTHALMOLOGY

## 2022-11-17 PROCEDURE — G0463 HOSPITAL OUTPT CLINIC VISIT: HCPCS

## 2022-11-17 RX ADMIN — Medication 1.25 MG: at 08:42

## 2022-11-17 RX ADMIN — LIDOCAINE HYDROCHLORIDE 0.5 ML: 20 INJECTION, SOLUTION EPIDURAL; INFILTRATION; INTRACAUDAL; PERINEURAL at 08:42

## 2022-11-17 ASSESSMENT — VISUAL ACUITY
OS_SC: HM
OD_SC: 20/300
METHOD: SNELLEN - LINEAR

## 2022-11-17 ASSESSMENT — SLIT LAMP EXAM - LIDS
COMMENTS: NORMAL
COMMENTS: NORMAL

## 2022-11-17 ASSESSMENT — TONOMETRY
OS_IOP_MMHG: 13
IOP_METHOD: TONOPEN
OD_IOP_MMHG: 16

## 2022-11-17 ASSESSMENT — CONF VISUAL FIELD
OS_INFERIOR_TEMPORAL_RESTRICTION: 1
OS_INFERIOR_NASAL_RESTRICTION: 1
OS_SUPERIOR_NASAL_RESTRICTION: 1
OS_SUPERIOR_TEMPORAL_RESTRICTION: 1

## 2022-11-17 ASSESSMENT — EXTERNAL EXAM - LEFT EYE: OS_EXAM: NORMAL

## 2022-11-17 ASSESSMENT — EXTERNAL EXAM - RIGHT EYE: OD_EXAM: NORMAL

## 2022-11-17 NOTE — PROGRESS NOTES
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 VPT less likely given FA findings, US rules out osteoma    - may benefit from  anti-VEGF left eye     # Pseudophakia both eyes    Cataract extraction intraocular lens left eye 9.27.22  Intraocular lens in good position  Doing well  With posterior capsular opacity (PCO)  observe    Plan:  avastin inj left eye 11/17/22    Retina detachment and endophthalmitis precautions were discussed with the patient and family who was asked to return if any of the those occur  Medications to left eye: none  Follow up in 5-6  Weeks for Avastin inj left eye only     ~~~~~~~~~~~~~~~~~~~~~~~~~~~~~~~~~~   Complete documentation of historical and exam elements from today's encounter can be found in the full encounter summary report (not reduplicated in this progress note).  I personally obtained the chief complaint(s) and history of present illness.  I confirmed and edited as necessary the review of systems, past medical/surgical history, family history, social history, and  examination findings as documented by others; and I examined the patient myself.  I personally reviewed the relevant tests, images, and reports as documented above.  I formulated and edited as necessary the assessment and plan and discussed the findings and management plan with the patient and family and No resident or fellow assisted with the procedures performed.  I performed the procedures myself.    Mary Chacko MD  Professor of Ophthalmology  Vitreo-Retinal surgeon   Department of Ophthalmology and Visual Neurosciences   [facility]  Phone: [phone]   Fax: [phone]

## 2022-11-18 ENCOUNTER — PATIENT OUTREACH (OUTPATIENT)
Dept: GERIATRIC MEDICINE | Facility: CLINIC | Age: 77
End: 2022-11-18

## 2022-11-18 NOTE — PROGRESS NOTES
[facility] Care Coordination Contact    Received voicemail from MADDIE Fernandez from [facility] PCA agency. She said the member's Responsible Party asked about getting more PCA hours or respite care. Need to ask Rebecca if member has a change in condition. If not, then she would not qualify for an increase in PCA hours.    Discussed with my supervisor who said the options would be to look for another person to work as the member's PCA, to give the current caregiver(s) a break. If they don't know of someone who could be a PCA, they could look at other PCA agencies that could help find staffing. [facility] is a choice organization, so they are not able to assist with that.    Nursing facilities are very full and staffing is tight, so respite care is hard to come by.   I left a voicemail for Pedro Pablobhavik at [facility] in [place], as a coworker had luck there a couple months ago for respite care.    Another option for Wen would be Adult Day Care - somewhere that can provide help with ADLs, such as [facility] in [place]. Member is dependent for all ADLs, including dressing, eating, toileting (incontinent), transfers & assistance pushing her wheelchair.    Spoke with Karly Rodriguez, Director of [facility] ([email or web address];   [phone]). She said they have a minimum of at 2 days per week, so in order to fit in member's budget, she may need to decrease amount of PCA hours she has.    Left voicemail for Rebecca at [facility], informing her of the above. Asked for return call to discuss more specifics. Need to know if the member has had a change in condition.Ifrah Jewell RN  [facility]   [phone]

## 2022-11-22 NOTE — PROGRESS NOTES
[facility] Care Coordination Contact  First attempt  Called family Responsible Party, Toan Sanchez, to complete six month assessment and left a message requesting a return call.    Also need to discuss Toan's request for respite care, which Rebecca from [facility] informed me of. Per Rebecca's email today, the member has been waking at night and     Left voicemail for [facility] program at [facility], to find out more about respite through their agency, using the member's current PCA staff.      Ifrah Jewell RN  [facility]   [phone]

## 2022-11-22 NOTE — PROGRESS NOTES
[facility] Care Coordination Contact    Received follow-up email from MADDIE Fernandez from [facility], with the phone number for [address] program, to get more information about respite care through their agency.     Left detailed voicemail at [facility], asking someone to return my call with info about [facility] respite care. Also left my email and asked specifically for info about how to initiate respite care, the cost per unit and if there is a minimum number of units or hours of respite required.    Ifrah Jewell RN  [facility]   [phone]

## 2022-12-21 ENCOUNTER — OFFICE VISIT (OUTPATIENT)
Dept: OPHTHALMOLOGY | Facility: CLINIC | Age: 77
End: 2022-12-21
Attending: OPHTHALMOLOGY
Payer: COMMERCIAL

## 2022-12-21 DIAGNOSIS — H35.3221 EXUDATIVE AGE-RELATED MACULAR DEGENERATION OF LEFT EYE WITH ACTIVE CHOROIDAL NEOVASCULARIZATION (H): Primary | ICD-10-CM

## 2022-12-21 PROCEDURE — 67028 INJECTION EYE DRUG: CPT | Mod: LT | Performed by: OPHTHALMOLOGY

## 2022-12-21 PROCEDURE — 250N000011 HC RX IP 250 OP 636: Performed by: OPHTHALMOLOGY

## 2022-12-21 PROCEDURE — 999N000103 HC STATISTIC NO CHARGE FACILITY FEE

## 2022-12-21 RX ADMIN — Medication 1.25 MG: at 08:45

## 2022-12-21 ASSESSMENT — CONF VISUAL FIELD
OS_SUPERIOR_TEMPORAL_RESTRICTION: 1
OS_INFERIOR_NASAL_RESTRICTION: 1
OD_SUPERIOR_NASAL_RESTRICTION: 3
OD_INFERIOR_TEMPORAL_RESTRICTION: 3
OS_SUPERIOR_NASAL_RESTRICTION: 1
OD_INFERIOR_NASAL_RESTRICTION: 3
OS_INFERIOR_TEMPORAL_RESTRICTION: 1
OD_SUPERIOR_TEMPORAL_RESTRICTION: 3

## 2022-12-21 ASSESSMENT — VISUAL ACUITY
METHOD: SNELLEN - LINEAR
OD_SC: 20/500
OS_SC: HM

## 2022-12-21 ASSESSMENT — REFRACTION_WEARINGRX
OD_SPHERE: -2.25
OS_CYLINDER: +2.00
OS_SPHERE: -2.00
OS_AXIS: 011
OD_CYLINDER: +1.25
OD_AXIS: 180

## 2022-12-21 ASSESSMENT — SLIT LAMP EXAM - LIDS
COMMENTS: NORMAL
COMMENTS: NORMAL

## 2022-12-21 ASSESSMENT — EXTERNAL EXAM - RIGHT EYE: OD_EXAM: NORMAL

## 2022-12-21 ASSESSMENT — TONOMETRY
OS_IOP_MMHG: 10
OD_IOP_MMHG: 13
IOP_METHOD: TONOPEN

## 2022-12-21 ASSESSMENT — EXTERNAL EXAM - LEFT EYE: OS_EXAM: NORMAL

## 2022-12-21 NOTE — NURSING NOTE
Chief Complaints and History of Present Illnesses   Patient presents with     Macular Degeneration Follow Up     5 week follow up both eyes.     Chief Complaint(s) and History of Present Illness(es)     Macular Degeneration Follow Up            Comments: 5 week follow up both eyes.          Comments    Pt with her her brother today.  Pt states vision is worse since last visit. Per brother, pt can see well some days but not other.  No eye pain today. No new flashes or floaters.    NICKI Amor December 21, 2022 8:20 AM

## 2022-12-21 NOTE — PROGRESS NOTES
CC: follow up wet Age related macular degeneration   Here for inj only left eye   Postoperative status post Cataract extraction intraocular lens left eye 9.27.22    HPI Ban Petersen is a 77 year old female who presents today for follow up Cataract extraction intraocular lens left eye and Age related macular degeneration; possible  Avastin inj    Exam: Intraocular lens in good position  Doing well    Imaging:  Optical Coherence Tomography 11/17/22   Right eye: subretinal fibrosis and intraretinal fluid   Left eye: subretinal fluid; significant intraretinal fluid     optos consistent with exam    ASSESSMENT:     # 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 VPT less likely given FA findings, US rules out osteoma    - may benefit from  anti-VEGF left eye     # Pseudophakia both eyes    Cataract extraction intraocular lens left eye 9.27.22  Intraocular lens in good position  Doing well  With posterior capsular opacity (PCO)  observe    Plan:  avastin inj left eye 12/21/22   Retina detachment and endophthalmitis precautions were discussed with the patient and family who was asked to return if any of the those occur  Medications to left eye: none  Follow up in 6  Weeks for Avastin inj left eye, dilation optos and Optical Coherence Tomography   Low vision prescription with xiang   ~~~~~~~~~~~~~~~~~~~~~~~~~~~~~~~~~~   Complete documentation of historical and exam elements from today's encounter can be found in the full encounter summary report (not reduplicated in this progress note).  I personally obtained the chief complaint(s) and history of present illness.  I confirmed and edited as  necessary the review of systems, past medical/surgical history, family history, social history, and examination findings as documented by others; and I examined the patient myself.  I personally reviewed the relevant tests, images, and reports as documented above.  I formulated and edited as necessary the assessment and plan and discussed the findings and management plan with the patient and family and No resident or fellow assisted with the procedures performed.  I performed the procedures myself.    Mary Chacko MD  Professor of Ophthalmology  Vitreo-Retinal surgeon   Department of Ophthalmology and Visual Neurosciences   [facility]  Phone: [phone]   Fax: [phone]

## 2023-01-01 ENCOUNTER — MEDICAL CORRESPONDENCE (OUTPATIENT)
Dept: HEALTH INFORMATION MANAGEMENT | Facility: CLINIC | Age: 78
End: 2023-01-01

## 2023-01-01 ENCOUNTER — PATIENT OUTREACH (OUTPATIENT)
Dept: GERIATRIC MEDICINE | Facility: CLINIC | Age: 78
End: 2023-01-01
Payer: COMMERCIAL

## 2023-01-01 ENCOUNTER — TELEPHONE (OUTPATIENT)
Dept: FAMILY MEDICINE | Facility: CLINIC | Age: 78
End: 2023-01-01

## 2023-01-01 ENCOUNTER — TELEPHONE (OUTPATIENT)
Dept: FAMILY MEDICINE | Facility: CLINIC | Age: 78
End: 2023-01-01
Payer: COMMERCIAL

## 2023-01-01 ENCOUNTER — MEDICAL CORRESPONDENCE (OUTPATIENT)
Dept: HEALTH INFORMATION MANAGEMENT | Facility: CLINIC | Age: 78
End: 2023-01-01
Payer: COMMERCIAL

## 2023-01-01 ENCOUNTER — ANCILLARY PROCEDURE (OUTPATIENT)
Dept: NEUROLOGY | Facility: CLINIC | Age: 78
End: 2023-01-01
Payer: COMMERCIAL

## 2023-01-01 ENCOUNTER — ALLIED HEALTH/NURSE VISIT (OUTPATIENT)
Dept: OTHER | Facility: CLINIC | Age: 78
End: 2023-01-01
Payer: COMMERCIAL

## 2023-01-01 ENCOUNTER — LAB (OUTPATIENT)
Dept: LAB | Facility: CLINIC | Age: 78
End: 2023-01-01
Payer: COMMERCIAL

## 2023-01-01 ENCOUNTER — VIRTUAL VISIT (OUTPATIENT)
Dept: FAMILY MEDICINE | Facility: CLINIC | Age: 78
End: 2023-01-01
Payer: COMMERCIAL

## 2023-01-01 ENCOUNTER — TELEPHONE (OUTPATIENT)
Dept: NEUROLOGY | Facility: CLINIC | Age: 78
End: 2023-01-01

## 2023-01-01 ENCOUNTER — PATIENT OUTREACH (OUTPATIENT)
Dept: CARE COORDINATION | Facility: CLINIC | Age: 78
End: 2023-01-01
Payer: COMMERCIAL

## 2023-01-01 ENCOUNTER — OFFICE VISIT (OUTPATIENT)
Dept: OPHTHALMOLOGY | Facility: CLINIC | Age: 78
End: 2023-01-01
Attending: OPHTHALMOLOGY
Payer: COMMERCIAL

## 2023-01-01 ENCOUNTER — PATIENT OUTREACH (OUTPATIENT)
Dept: GERIATRIC MEDICINE | Facility: CLINIC | Age: 78
End: 2023-01-01

## 2023-01-01 ENCOUNTER — MEDICAL CORRESPONDENCE (OUTPATIENT)
Dept: FAMILY MEDICINE | Facility: CLINIC | Age: 78
End: 2023-01-01
Payer: COMMERCIAL

## 2023-01-01 ENCOUNTER — ALLIED HEALTH/NURSE VISIT (OUTPATIENT)
Dept: OTHER | Facility: CLINIC | Age: 78
End: 2023-01-01
Attending: FAMILY MEDICINE
Payer: COMMERCIAL

## 2023-01-01 ENCOUNTER — OFFICE VISIT (OUTPATIENT)
Dept: OPTOMETRY | Facility: CLINIC | Age: 78
End: 2023-01-01
Attending: OPHTHALMOLOGY
Payer: COMMERCIAL

## 2023-01-01 ENCOUNTER — OFFICE VISIT (OUTPATIENT)
Dept: FAMILY MEDICINE | Facility: CLINIC | Age: 78
End: 2023-01-01
Payer: COMMERCIAL

## 2023-01-01 ENCOUNTER — PRE VISIT (OUTPATIENT)
Dept: NEUROLOGY | Facility: CLINIC | Age: 78
End: 2023-01-01

## 2023-01-01 ENCOUNTER — OFFICE VISIT (OUTPATIENT)
Dept: NEUROLOGY | Facility: CLINIC | Age: 78
End: 2023-01-01
Attending: FAMILY MEDICINE
Payer: COMMERCIAL

## 2023-01-01 VITALS
TEMPERATURE: 97.2 F | DIASTOLIC BLOOD PRESSURE: 60 MMHG | SYSTOLIC BLOOD PRESSURE: 112 MMHG | OXYGEN SATURATION: 96 % | HEART RATE: 70 BPM

## 2023-01-01 VITALS
OXYGEN SATURATION: 95 % | SYSTOLIC BLOOD PRESSURE: 118 MMHG | DIASTOLIC BLOOD PRESSURE: 62 MMHG | TEMPERATURE: 97.2 F | HEART RATE: 71 BPM

## 2023-01-01 VITALS
HEART RATE: 64 BPM | RESPIRATION RATE: 12 BRPM | DIASTOLIC BLOOD PRESSURE: 81 MMHG | OXYGEN SATURATION: 95 % | SYSTOLIC BLOOD PRESSURE: 145 MMHG

## 2023-01-01 VITALS
TEMPERATURE: 97.5 F | HEART RATE: 74 BPM | RESPIRATION RATE: 16 BRPM | BODY MASS INDEX: 27.46 KG/M2 | DIASTOLIC BLOOD PRESSURE: 82 MMHG | OXYGEN SATURATION: 100 % | SYSTOLIC BLOOD PRESSURE: 137 MMHG | WEIGHT: 165 LBS

## 2023-01-01 DIAGNOSIS — L89.612 PRESSURE INJURY OF RIGHT HEEL, STAGE 2 (H): ICD-10-CM

## 2023-01-01 DIAGNOSIS — G62.9 PERIPHERAL POLYNEUROPATHY: ICD-10-CM

## 2023-01-01 DIAGNOSIS — H35.3221 EXUDATIVE AGE-RELATED MACULAR DEGENERATION OF LEFT EYE WITH ACTIVE CHOROIDAL NEOVASCULARIZATION (H): Primary | ICD-10-CM

## 2023-01-01 DIAGNOSIS — M25.551 PAIN OF RIGHT HIP JOINT: ICD-10-CM

## 2023-01-01 DIAGNOSIS — G81.01 FLACCID HEMIPLEGIA OF RIGHT DOMINANT SIDE DUE TO NONCEREBROVASCULAR ETIOLOGY (H): ICD-10-CM

## 2023-01-01 DIAGNOSIS — G40.909 SEIZURE DISORDER (H): ICD-10-CM

## 2023-01-01 DIAGNOSIS — L89.622 PRESSURE INJURY OF LEFT HEEL, STAGE 2 (H): ICD-10-CM

## 2023-01-01 DIAGNOSIS — H52.4 PRESBYOPIA: ICD-10-CM

## 2023-01-01 DIAGNOSIS — R56.9 SEIZURES (H): ICD-10-CM

## 2023-01-01 DIAGNOSIS — Z79.899 MEDICATION MANAGEMENT: ICD-10-CM

## 2023-01-01 DIAGNOSIS — F11.90 CHRONIC, CONTINUOUS USE OF OPIOIDS: ICD-10-CM

## 2023-01-01 DIAGNOSIS — N39.3 STRESS INCONTINENCE OF URINE: Primary | ICD-10-CM

## 2023-01-01 DIAGNOSIS — G89.4 CHRONIC PAIN SYNDROME: ICD-10-CM

## 2023-01-01 DIAGNOSIS — G81.91 HEMIPARESIS OF RIGHT DOMINANT SIDE, UNSPECIFIED HEMIPARESIS ETIOLOGY (H): ICD-10-CM

## 2023-01-01 DIAGNOSIS — F02.80 DEMENTIA ASSOCIATED WITH OTHER UNDERLYING DISEASE WITHOUT BEHAVIORAL DISTURBANCE (H): ICD-10-CM

## 2023-01-01 DIAGNOSIS — L89.622 PRESSURE INJURY OF LEFT HEEL, STAGE 2 (H): Primary | ICD-10-CM

## 2023-01-01 DIAGNOSIS — G40.909 SEIZURE DISORDER (H): Primary | ICD-10-CM

## 2023-01-01 DIAGNOSIS — I10 ESSENTIAL (PRIMARY) HYPERTENSION: ICD-10-CM

## 2023-01-01 DIAGNOSIS — H35.3231 EXUDATIVE AGE-RELATED MACULAR DEGENERATION OF BOTH EYES WITH ACTIVE CHOROIDAL NEOVASCULARIZATION (H): Primary | ICD-10-CM

## 2023-01-01 DIAGNOSIS — Z23 HIGH PRIORITY FOR 2019-NCOV VACCINE: Primary | ICD-10-CM

## 2023-01-01 DIAGNOSIS — S06.9X9S TRAUMATIC BRAIN INJURY WITH LOSS OF CONSCIOUSNESS, SEQUELA (H): ICD-10-CM

## 2023-01-01 DIAGNOSIS — H35.3221 EXUDATIVE AGE-RELATED MACULAR DEGENERATION OF LEFT EYE WITH ACTIVE CHOROIDAL NEOVASCULARIZATION (H): ICD-10-CM

## 2023-01-01 DIAGNOSIS — G81.01 FLACCID HEMIPLEGIA OF RIGHT DOMINANT SIDE DUE TO NONCEREBROVASCULAR ETIOLOGY (H): Primary | ICD-10-CM

## 2023-01-01 DIAGNOSIS — G40.209 PARTIAL EPILEPSY WITH IMPAIRMENT OF CONSCIOUSNESS (H): Primary | ICD-10-CM

## 2023-01-01 DIAGNOSIS — L89.612 PRESSURE INJURY OF RIGHT HEEL, STAGE 2 (H): Primary | ICD-10-CM

## 2023-01-01 DIAGNOSIS — Z96.1 PSEUDOPHAKIA OF BOTH EYES: ICD-10-CM

## 2023-01-01 DIAGNOSIS — R29.6 RECURRENT FALLS: ICD-10-CM

## 2023-01-01 LAB
ALBUMIN SERPL BCG-MCNC: 4 G/DL (ref 3.5–5.2)
ALP SERPL-CCNC: 110 U/L (ref 35–104)
ALT SERPL W P-5'-P-CCNC: 10 U/L (ref 10–35)
AMPHETAMINES UR QL: NOT DETECTED
ANION GAP SERPL CALCULATED.3IONS-SCNC: 12 MMOL/L (ref 7–15)
AST SERPL W P-5'-P-CCNC: 17 U/L (ref 10–35)
BARBITURATES UR QL SCN: DETECTED
BENZODIAZ UR QL SCN: NOT DETECTED
BILIRUB SERPL-MCNC: 0.2 MG/DL
BUN SERPL-MCNC: 13.7 MG/DL (ref 8–23)
BUPRENORPHINE UR QL: NOT DETECTED
CALCIUM SERPL-MCNC: 9.7 MG/DL (ref 8.8–10.2)
CANNABINOIDS UR QL: NOT DETECTED
CHLORIDE SERPL-SCNC: 102 MMOL/L (ref 98–107)
COCAINE UR QL SCN: NOT DETECTED
CREAT SERPL-MCNC: 0.74 MG/DL (ref 0.51–0.95)
D-METHAMPHET UR QL: NOT DETECTED
DEPRECATED HCO3 PLAS-SCNC: 27 MMOL/L (ref 22–29)
ERYTHROCYTE [DISTWIDTH] IN BLOOD BY AUTOMATED COUNT: 11.9 % (ref 10–15)
GFR SERPL CREATININE-BSD FRML MDRD: 83 ML/MIN/1.73M2
GLUCOSE SERPL-MCNC: 83 MG/DL (ref 70–99)
HCT VFR BLD AUTO: 44.5 % (ref 35–47)
HGB BLD-MCNC: 14.2 G/DL (ref 11.7–15.7)
MCH RBC QN AUTO: 32.5 PG (ref 26.5–33)
MCHC RBC AUTO-ENTMCNC: 31.9 G/DL (ref 31.5–36.5)
MCV RBC AUTO: 102 FL (ref 78–100)
METHADONE UR QL SCN: NOT DETECTED
OPIATES UR QL SCN: DETECTED
OXYCODONE UR QL SCN: NOT DETECTED
PCP UR QL SCN: NOT DETECTED
PHENYTOIN SERPL-MCNC: 5.7 UG/ML
PLATELET # BLD AUTO: 204 10E3/UL (ref 150–450)
POTASSIUM SERPL-SCNC: 4.1 MMOL/L (ref 3.4–5.3)
PROPOXYPH UR QL: NOT DETECTED
PROT SERPL-MCNC: 6.7 G/DL (ref 6.4–8.3)
RBC # BLD AUTO: 4.37 10E6/UL (ref 3.8–5.2)
SODIUM SERPL-SCNC: 141 MMOL/L (ref 136–145)
TRICYCLICS UR QL SCN: NOT DETECTED
WBC # BLD AUTO: 7.6 10E3/UL (ref 4–11)

## 2023-01-01 PROCEDURE — 91320 SARSCV2 VAC 30MCG TRS-SUC IM: CPT | Performed by: FAMILY MEDICINE

## 2023-01-01 PROCEDURE — 99215 OFFICE O/P EST HI 40 MIN: CPT | Mod: 25 | Performed by: FAMILY MEDICINE

## 2023-01-01 PROCEDURE — 91312 COVID-19 VACCINE BIVALENT BOOSTER 12+ (PFIZER): CPT

## 2023-01-01 PROCEDURE — G0463 HOSPITAL OUTPT CLINIC VISIT: HCPCS | Performed by: OPHTHALMOLOGY

## 2023-01-01 PROCEDURE — 95718 EEG PHYS/QHP 2-12 HR W/VEEG: CPT | Performed by: PSYCHIATRY & NEUROLOGY

## 2023-01-01 PROCEDURE — 92250 FUNDUS PHOTOGRAPHY W/I&R: CPT | Performed by: OPHTHALMOLOGY

## 2023-01-01 PROCEDURE — 99207 PR COMMUNITY PARAMEDIC - PATIENT NOT BILLABLE: CPT

## 2023-01-01 PROCEDURE — 0124A COVID-19 VACCINE BIVALENT BOOSTER 12+ (PFIZER): CPT

## 2023-01-01 PROCEDURE — G0008 ADMIN INFLUENZA VIRUS VAC: HCPCS

## 2023-01-01 PROCEDURE — 76510 OPH US DX B-SCAN&QUAN A-SCAN: CPT | Performed by: OPHTHALMOLOGY

## 2023-01-01 PROCEDURE — 36415 COLL VENOUS BLD VENIPUNCTURE: CPT

## 2023-01-01 PROCEDURE — 99213 OFFICE O/P EST LOW 20 MIN: CPT | Performed by: OPHTHALMOLOGY

## 2023-01-01 PROCEDURE — 99205 OFFICE O/P NEW HI 60 MIN: CPT | Performed by: PSYCHIATRY & NEUROLOGY

## 2023-01-01 PROCEDURE — 80306 DRUG TEST PRSMV INSTRMNT: CPT

## 2023-01-01 PROCEDURE — 92134 CPTRZ OPH DX IMG PST SGM RTA: CPT | Performed by: OPHTHALMOLOGY

## 2023-01-01 PROCEDURE — 80185 ASSAY OF PHENYTOIN TOTAL: CPT

## 2023-01-01 PROCEDURE — 80053 COMPREHEN METABOLIC PANEL: CPT

## 2023-01-01 PROCEDURE — 90662 IIV NO PRSV INCREASED AG IM: CPT

## 2023-01-01 PROCEDURE — 95713 VEEG 2-12 HR CONT MNTR: CPT | Performed by: PSYCHIATRY & NEUROLOGY

## 2023-01-01 PROCEDURE — 99207 FUNDUS PHOTOS OU (BOTH EYES): CPT | Mod: 26 | Performed by: OPHTHALMOLOGY

## 2023-01-01 PROCEDURE — G0008 ADMIN INFLUENZA VIRUS VAC: HCPCS | Performed by: FAMILY MEDICINE

## 2023-01-01 PROCEDURE — 999N000103 HC STATISTIC NO CHARGE FACILITY FEE

## 2023-01-01 PROCEDURE — 99214 OFFICE O/P EST MOD 30 MIN: CPT | Mod: VID | Performed by: FAMILY MEDICINE

## 2023-01-01 PROCEDURE — 95700 EEG CONT REC W/VID EEG TECH: CPT | Performed by: PSYCHIATRY & NEUROLOGY

## 2023-01-01 PROCEDURE — 90662 IIV NO PRSV INCREASED AG IM: CPT | Performed by: FAMILY MEDICINE

## 2023-01-01 PROCEDURE — 90480 ADMN SARSCOV2 VAC 1/ONLY CMP: CPT | Performed by: FAMILY MEDICINE

## 2023-01-01 PROCEDURE — 85014 HEMATOCRIT: CPT

## 2023-01-01 PROCEDURE — 92014 COMPRE OPH EXAM EST PT 1/>: CPT | Performed by: OPHTHALMOLOGY

## 2023-01-01 RX ORDER — PHENYTOIN SODIUM 100 MG/1
CAPSULE, EXTENDED RELEASE ORAL
Qty: 180 CAPSULE | Refills: 3 | Status: SHIPPED | OUTPATIENT
Start: 2023-01-01

## 2023-01-01 RX ORDER — ACETAMINOPHEN AND CODEINE PHOSPHATE 300; 30 MG/1; MG/1
1-2 TABLET ORAL 3 TIMES DAILY PRN
Qty: 30 TABLET | Refills: 5 | Status: SHIPPED | OUTPATIENT
Start: 2023-01-01

## 2023-01-01 RX ORDER — GABAPENTIN 400 MG/1
CAPSULE ORAL
Qty: 180 CAPSULE | Refills: 3 | Status: SHIPPED | OUTPATIENT
Start: 2023-01-01

## 2023-01-01 SDOH — SOCIAL STABILITY: SOCIAL INSECURITY: ARE YOU MARRIED, WIDOWED, DIVORCED, SEPARATED, NEVER MARRIED, OR LIVING WITH A PARTNER?: DIVORCED

## 2023-01-01 SDOH — ECONOMIC STABILITY: HOUSING INSECURITY
IN THE LAST 12 MONTHS, WAS THERE A TIME WHEN YOU DID NOT HAVE A STEADY PLACE TO SLEEP OR SLEPT IN A SHELTER (INCLUDING NOW)?: NO

## 2023-01-01 SDOH — ECONOMIC STABILITY: HOUSING INSECURITY: IN THE LAST 12 MONTHS, HOW MANY PLACES HAVE YOU LIVED?: 1

## 2023-01-01 SDOH — SOCIAL STABILITY: SOCIAL NETWORK
IN A TYPICAL WEEK, HOW MANY TIMES DO YOU TALK ON THE PHONE WITH FAMILY, FRIENDS, OR NEIGHBORS?: MORE THAN THREE TIMES A WEEK

## 2023-01-01 SDOH — HEALTH STABILITY: MENTAL HEALTH: HOW MANY DRINKS CONTAINING ALCOHOL DO YOU HAVE ON A TYPICAL DAY WHEN YOU ARE DRINKING?: PATIENT DOES NOT DRINK

## 2023-01-01 SDOH — HEALTH STABILITY: PHYSICAL HEALTH: ON AVERAGE, HOW MANY MINUTES DO YOU ENGAGE IN EXERCISE AT THIS LEVEL?: 0 MIN

## 2023-01-01 SDOH — ECONOMIC STABILITY: HOUSING INSECURITY: IN THE LAST 12 MONTHS, WAS THERE A TIME WHEN YOU WERE NOT ABLE TO PAY THE MORTGAGE OR RENT ON TIME?: NO

## 2023-01-01 SDOH — ECONOMIC STABILITY: FOOD INSECURITY: WITHIN THE PAST 12 MONTHS, YOU WORRIED THAT YOUR FOOD WOULD RUN OUT BEFORE YOU GOT THE MONEY TO BUY MORE.: NEVER TRUE

## 2023-01-01 SDOH — SOCIAL STABILITY: SOCIAL NETWORK: HOW OFTEN DO YOU ATTEND MEETINGS OF THE CLUBS OR ORGANIZATIONS YOU BELONG TO?: NEVER

## 2023-01-01 SDOH — SOCIAL STABILITY: SOCIAL NETWORK: HOW OFTEN DO YOU ATTEND CHURCH OR RELIGIOUS SERVICES?: NEVER

## 2023-01-01 SDOH — SOCIAL STABILITY: SOCIAL NETWORK
DO YOU BELONG TO ANY CLUBS OR ORGANIZATIONS SUCH AS CHURCH GROUPS, UNIONS, FRATERNAL OR ATHLETIC GROUPS, OR SCHOOL GROUPS?: NO

## 2023-01-01 SDOH — HEALTH STABILITY: PHYSICAL HEALTH: ON AVERAGE, HOW MANY DAYS PER WEEK DO YOU ENGAGE IN MODERATE TO STRENUOUS EXERCISE (LIKE A BRISK WALK)?: 0 DAYS

## 2023-01-01 SDOH — ECONOMIC STABILITY: FOOD INSECURITY: HOW HARD IS IT FOR YOU TO PAY FOR THE VERY BASICS LIKE FOOD, HOUSING, MEDICAL CARE, AND HEATING?: SOMEWHAT HARD

## 2023-01-01 SDOH — SOCIAL STABILITY: SOCIAL NETWORK: HOW OFTEN DO YOU ATTEND MEETINGS OF THE CLUBS OR ORGANIZATIONS YOU BELONG TO?: MORE THAN 4 TIMES PER YEAR

## 2023-01-01 SDOH — SOCIAL STABILITY: SOCIAL NETWORK: HOW OFTEN DO YOU GET TOGETHER WITH FRIENDS OR RELATIVES?: MORE THAN THREE TIMES A WEEK

## 2023-01-01 SDOH — HEALTH STABILITY: MENTAL HEALTH: HOW OFTEN DO YOU HAVE A DRINK CONTAINING ALCOHOL?: NEVER

## 2023-01-01 SDOH — HEALTH STABILITY: MENTAL HEALTH: HOW OFTEN DO YOU HAVE SIX OR MORE DRINKS ON ONE OCCASION?: NEVER

## 2023-01-01 SDOH — ECONOMIC STABILITY: TRANSPORTATION INSECURITY: IN THE PAST 12 MONTHS, HAS LACK OF TRANSPORTATION KEPT YOU FROM MEDICAL APPOINTMENTS OR FROM GETTING MEDICATIONS?: NO

## 2023-01-01 SDOH — ECONOMIC STABILITY: FOOD INSECURITY: WITHIN THE PAST 12 MONTHS, THE FOOD YOU BOUGHT JUST DIDN'T LAST AND YOU DIDN'T HAVE MONEY TO GET MORE.: NEVER TRUE

## 2023-01-01 ASSESSMENT — CONF VISUAL FIELD
OS_INFERIOR_TEMPORAL_RESTRICTION: 1
OS_SUPERIOR_NASAL_RESTRICTION: 1
OS_INFERIOR_NASAL_RESTRICTION: 1
OD_INFERIOR_TEMPORAL_RESTRICTION: 2
OD_SUPERIOR_TEMPORAL_RESTRICTION: 3
OS_INFERIOR_TEMPORAL_RESTRICTION: 1
OS_INFERIOR_NASAL_RESTRICTION: 1
OS_SUPERIOR_NASAL_RESTRICTION: 1
OS_INFERIOR_TEMPORAL_RESTRICTION: 1
OS_SUPERIOR_TEMPORAL_RESTRICTION: 1
OD_SUPERIOR_NASAL_RESTRICTION: 2
OD_INFERIOR_TEMPORAL_RESTRICTION: 2
OS_SUPERIOR_TEMPORAL_RESTRICTION: 1
OS_SUPERIOR_NASAL_RESTRICTION: 1
OD_SUPERIOR_NASAL_RESTRICTION: 3
OD_INFERIOR_TEMPORAL_RESTRICTION: 3
OD_INFERIOR_NASAL_RESTRICTION: 2
OD_INFERIOR_NASAL_RESTRICTION: 2
OD_SUPERIOR_NASAL_RESTRICTION: 2
OD_SUPERIOR_TEMPORAL_RESTRICTION: 2
OD_SUPERIOR_TEMPORAL_RESTRICTION: 2
OS_SUPERIOR_TEMPORAL_RESTRICTION: 1
OS_INFERIOR_NASAL_RESTRICTION: 1
OD_INFERIOR_NASAL_RESTRICTION: 3

## 2023-01-01 ASSESSMENT — TONOMETRY
OS_IOP_MMHG: 10
OD_IOP_MMHG: 10
OD_IOP_MMHG: 10
IOP_METHOD: TONOPEN
OD_IOP_MMHG: 10

## 2023-01-01 ASSESSMENT — ANXIETY QUESTIONNAIRES
GAD7 TOTAL SCORE: 0
3. WORRYING TOO MUCH ABOUT DIFFERENT THINGS: NOT AT ALL
4. TROUBLE RELAXING: NOT AT ALL
IF YOU CHECKED OFF ANY PROBLEMS ON THIS QUESTIONNAIRE, HOW DIFFICULT HAVE THESE PROBLEMS MADE IT FOR YOU TO DO YOUR WORK, TAKE CARE OF THINGS AT HOME, OR GET ALONG WITH OTHER PEOPLE: NOT DIFFICULT AT ALL
GAD7 TOTAL SCORE: 0
2. NOT BEING ABLE TO STOP OR CONTROL WORRYING: NOT AT ALL
8. IF YOU CHECKED OFF ANY PROBLEMS, HOW DIFFICULT HAVE THESE MADE IT FOR YOU TO DO YOUR WORK, TAKE CARE OF THINGS AT HOME, OR GET ALONG WITH OTHER PEOPLE?: NOT DIFFICULT AT ALL
1. FEELING NERVOUS, ANXIOUS, OR ON EDGE: NOT AT ALL
7. FEELING AFRAID AS IF SOMETHING AWFUL MIGHT HAPPEN: NOT AT ALL
7. FEELING AFRAID AS IF SOMETHING AWFUL MIGHT HAPPEN: NOT AT ALL
GAD7 TOTAL SCORE: 0
6. BECOMING EASILY ANNOYED OR IRRITABLE: NOT AT ALL
GAD7 TOTAL SCORE: 0
5. BEING SO RESTLESS THAT IT IS HARD TO SIT STILL: NOT AT ALL

## 2023-01-01 ASSESSMENT — EXTERNAL EXAM - LEFT EYE
OS_EXAM: NORMAL
OS_EXAM: NORMAL

## 2023-01-01 ASSESSMENT — REFRACTION_MANIFEST
OD_SPHERE: +1.00
OD_CYLINDER: SPHERE
OS_SPHERE: BALANCE

## 2023-01-01 ASSESSMENT — VISUAL ACUITY
OD_SC: 20/300
METHOD: SNELLEN - LINEAR
OS_SC: HM
OS_SC: HM
OD_CC: 20/250
METHOD: SNELLEN - LINEAR
OS_SC: HM

## 2023-01-01 ASSESSMENT — SLIT LAMP EXAM - LIDS
COMMENTS: NORMAL

## 2023-01-01 ASSESSMENT — PATIENT HEALTH QUESTIONNAIRE - PHQ9
10. IF YOU CHECKED OFF ANY PROBLEMS, HOW DIFFICULT HAVE THESE PROBLEMS MADE IT FOR YOU TO DO YOUR WORK, TAKE CARE OF THINGS AT HOME, OR GET ALONG WITH OTHER PEOPLE: NOT DIFFICULT AT ALL
10. IF YOU CHECKED OFF ANY PROBLEMS, HOW DIFFICULT HAVE THESE PROBLEMS MADE IT FOR YOU TO DO YOUR WORK, TAKE CARE OF THINGS AT HOME, OR GET ALONG WITH OTHER PEOPLE: NOT DIFFICULT AT ALL
SUM OF ALL RESPONSES TO PHQ QUESTIONS 1-9: 0

## 2023-01-01 ASSESSMENT — EXTERNAL EXAM - RIGHT EYE
OD_EXAM: NORMAL
OD_EXAM: NORMAL

## 2023-01-01 ASSESSMENT — ACTIVITIES OF DAILY LIVING (ADL)
DEPENDENT_IADLS:: CLEANING;COOKING;LAUNDRY;SHOPPING;MEAL PREPARATION;MEDICATION MANAGEMENT;MONEY MANAGEMENT;TRANSPORTATION;INCONTINENCE
DEPENDENT_IADLS:: CLEANING;COOKING;LAUNDRY;SHOPPING;MEAL PREPARATION;MEDICATION MANAGEMENT;MONEY MANAGEMENT;TRANSPORTATION;INCONTINENCE
LACK_OF_TRANSPORTATION: NO

## 2023-01-01 ASSESSMENT — PAIN SCALES - GENERAL
PAINLEVEL: NO PAIN (0)
PAINLEVEL: SEVERE PAIN (6)

## 2023-01-01 ASSESSMENT — LIFESTYLE VARIABLES
SKIP TO QUESTIONS 9-10: 1
AUDIT-C TOTAL SCORE: 0

## 2023-01-03 ENCOUNTER — PATIENT OUTREACH (OUTPATIENT)
Dept: GERIATRIC MEDICINE | Facility: CLINIC | Age: 78
End: 2023-01-03

## 2023-01-03 NOTE — PROGRESS NOTES
[facility] Care Coordination Contact    Received an email from Arianne Serrano at [facility], stating the auth for member's wipes  on 22. She said they have continued to deliver the wipes, as this was not caught until December. Per Arianne, a new auth is needed or the member will not get any further wipes.    According to member's POC from her 2022 assessment, the wipes should have been reauthorized from 22 thru 23.     Tasked [employer], to resubmit auth with updated waiver span dates.  Emailed Arianne and informed her they should be authorized from 22 - 23 & the auth will be resubmitted.  Ifrah Jewell RN  [facility]   [phone]

## 2023-01-13 ENCOUNTER — PATIENT OUTREACH (OUTPATIENT)
Dept: GERIATRIC MEDICINE | Facility: CLINIC | Age: 78
End: 2023-01-13
Payer: COMMERCIAL

## 2023-01-13 NOTE — PROGRESS NOTES
[facility] Care Coordination Contact    When speaking with member's brother, Toan, today he mentioned that they did not receive any wipes with her last delivery of incontinence supplies. He estimated about 10 days ago that they received the other supplies, but no wipes.    Per POC, member should receive 3 packs of wipes per month from [facility].    Emailed [facility], asking about the wipes. I advised Toan that I would call him when I hear back from [facility] next week and he voiced understanding and agreed with the plan.  Ifrah Jewell RN  [facility]   [phone]

## 2023-01-13 NOTE — PROGRESS NOTES
[facility] Care Coordination Contact  Second attempt:  [facility] Six-Month Telephone Assessment    6 month telephone assessment completed on 1/13/23 with member's brother Toan, who is her guardian.    ER visits: No  Hospitalizations: No  TCU stays: No  Significant health status changes: Per member's brother, Wen's vision continues to worsen. She sees her ophthalmologist regularly and has   Falls/Injuries: No  ADL/IADL changes: No  Changes in services: No    Caregiver Assessment follow up: Family are paid caregivers.    Goals: See POC in chart for goal progress documentation.      Will see member in 6 months for an annual health risk assessment.   Encouraged member to call CC with any questions or concerns in the meantime.   Ifrah Jewell RN  [facility]   [phone]

## 2023-01-16 NOTE — PROGRESS NOTES
[facility] Care Coordination Contact    Received email response from Shital at [facility], stating the auth for wipes  and they are waiting for the new auth    I responded to Shital & cc'elton [employer], that the auth for wipes was submitted to [facility] after her assessment in 2022 and again on 1/3/23, for the waiver span of 22 - 23.    Asked that they send wipes to the member ASAP.  Ifrah Jewell RN  [facility]   [phone]

## 2023-01-19 DIAGNOSIS — H35.3221 EXUDATIVE AGE-RELATED MACULAR DEGENERATION OF LEFT EYE WITH ACTIVE CHOROIDAL NEOVASCULARIZATION (H): Primary | ICD-10-CM

## 2023-01-19 NOTE — PROGRESS NOTES
[employer] said they still don't have the updated auth for member's wipes yet. Melita resent the auth to [facility] again today.  Writer called member's brother, Toan, and informed him that we have resubmitted paperwork to her insurance company and as soon as they update her records [facility] will send the wipes out.    Toan voiced understanding and agreed with the plan.  Ifrah Jewell RN  [facility]   [phone]

## 2023-02-01 ENCOUNTER — OFFICE VISIT (OUTPATIENT)
Dept: OPHTHALMOLOGY | Facility: CLINIC | Age: 78
End: 2023-02-01
Attending: OPHTHALMOLOGY
Payer: COMMERCIAL

## 2023-02-01 DIAGNOSIS — H35.3221 EXUDATIVE AGE-RELATED MACULAR DEGENERATION OF LEFT EYE WITH ACTIVE CHOROIDAL NEOVASCULARIZATION (H): ICD-10-CM

## 2023-02-01 PROCEDURE — 250N000011 HC RX IP 250 OP 636: Performed by: OPHTHALMOLOGY

## 2023-02-01 PROCEDURE — G0463 HOSPITAL OUTPT CLINIC VISIT: HCPCS | Mod: 25

## 2023-02-01 PROCEDURE — 92134 CPTRZ OPH DX IMG PST SGM RTA: CPT | Performed by: OPHTHALMOLOGY

## 2023-02-01 PROCEDURE — 67028 INJECTION EYE DRUG: CPT | Mod: LT | Performed by: OPHTHALMOLOGY

## 2023-02-01 RX ADMIN — Medication 1.25 MG: at 09:59

## 2023-02-01 RX ADMIN — LIDOCAINE HYDROCHLORIDE 0.5 ML: 20 INJECTION, SOLUTION EPIDURAL; INFILTRATION; INTRACAUDAL; PERINEURAL at 09:59

## 2023-02-01 ASSESSMENT — CONF VISUAL FIELD
OD_SUPERIOR_TEMPORAL_RESTRICTION: 3
OD_INFERIOR_NASAL_RESTRICTION: 3
OS_INFERIOR_TEMPORAL_RESTRICTION: 1
OS_SUPERIOR_NASAL_RESTRICTION: 1
OD_SUPERIOR_NASAL_RESTRICTION: 3
OS_SUPERIOR_TEMPORAL_RESTRICTION: 1
OD_INFERIOR_TEMPORAL_RESTRICTION: 3
OS_INFERIOR_NASAL_RESTRICTION: 1

## 2023-02-01 ASSESSMENT — VISUAL ACUITY
OS_SC: HM
METHOD: SNELLEN - LINEAR
OD_SC: 20/200

## 2023-02-01 ASSESSMENT — TONOMETRY
IOP_METHOD: TONOPEN
OD_IOP_MMHG: 14
OS_IOP_MMHG: 12

## 2023-02-01 ASSESSMENT — EXTERNAL EXAM - RIGHT EYE: OD_EXAM: NORMAL

## 2023-02-01 ASSESSMENT — EXTERNAL EXAM - LEFT EYE: OS_EXAM: NORMAL

## 2023-02-01 ASSESSMENT — SLIT LAMP EXAM - LIDS
COMMENTS: NORMAL
COMMENTS: NORMAL

## 2023-02-01 NOTE — NURSING NOTE
Chief Complaints and History of Present Illnesses   Patient presents with     Follow Up     6 week follow up Macular degeneration  Here for Injection LE  Done w drops   Pt states no change w injections   Pema BENNETT 8:45 AM February 1, 2023          Chief Complaint(s) and History of Present Illness(es)     Follow Up            Laterality: both eyes    Associated symptoms: floaters.  Negative for eye pain, pain with eye movement and flashes    Treatments tried: no treatments    Response to treatment: no improvement    Pain scale: 0/10    Comments: 6 week follow up Macular degeneration  Here for Injection LE  Done w drops   Pt states no change w injections   Pema Gonzalez [facility] 8:45 AM February 1, 2023

## 2023-02-01 NOTE — PROGRESS NOTES
CC: follow up wet Age related macular degeneration left eye   Here for inj only left eye   Postoperative status post Cataract extraction intraocular lens left eye 9.27.22    HPI Ban Petersen is a 77 year old female who presents today for follow up Cataract extraction intraocular lens left eye and Age related macular degeneration; possible Avastin inj    Imaging:  Optical Coherence Tomography 02/01/23   Right eye: subretinal fibrosis and intraretinal fluid, poor quality scan difficult to compare  Left eye: one single raster: subretinal fibrosis ; intraretinal fluid -- difficult to compare    ASSESSMENT:     # advanced 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 VPT less likely given FA findings, US rules out osteoma    -  s/p anti-VEGF left eye x3 (last 12/21/22, 6 weeks ago)  - plan repeat avastin left eye today    # Pseudophakia both eyes   Cataract extraction intraocular lens left eye 9.27.22  Intraocular lens in good position  Doing well  With posterior capsular opacity (PCO)  observe    Plan:  avastin inj left eye 02/01/23   Retina detachment and endophthalmitis precautions were discussed with the patient and family who was asked to return if any of the those occur  Medications to left eye: none  Follow up in 6-8 Weeks for Avastin inj left eye, dilation optos and Optical Coherence Tomography   Low vision prescription with xiang Alves MD  Ophthalmology Resident, PGY-3  [facility]    ~~~~~~~~~~~~~~~~~~~~~~~~~~~~~~~~~~   Complete documentation of historical and exam elements from today's encounter can be found in the full encounter  summary report (not reduplicated in this progress note).  I personally obtained the chief complaint(s) and history of present illness.  I confirmed and edited as necessary the review of systems, past medical/surgical history, family history, social history, and examination findings as documented by others; and I examined the patient myself.  I personally reviewed the relevant tests, images, and reports as documented above.  I personally reviewed the ophthalmic test(s) associated with this encounter, agree with the interpretation(s) as documented by the resident/fellow, and have edited the corresponding report(s) as necessary.   I formulated and edited as necessary the assessment and plan and discussed the findings and management plan with the patient and family and No resident or fellow assisted with the procedures performed.  I performed the procedures myself.    Mary Chacko MD  Professor of Ophthalmology  Vitreo-Retinal surgeon   Department of Ophthalmology and Visual Neurosciences   [facility]  Phone: [phone]   Fax: [phone]

## 2023-02-06 DIAGNOSIS — F11.90 CHRONIC, CONTINUOUS USE OF OPIOIDS: ICD-10-CM

## 2023-02-06 DIAGNOSIS — G62.9 PERIPHERAL POLYNEUROPATHY: ICD-10-CM

## 2023-02-06 DIAGNOSIS — G89.4 CHRONIC PAIN SYNDROME: ICD-10-CM

## 2023-02-06 DIAGNOSIS — M25.551 PAIN OF RIGHT HIP JOINT: ICD-10-CM

## 2023-02-07 RX ORDER — GABAPENTIN 400 MG/1
400 CAPSULE ORAL 2 TIMES DAILY
Qty: 180 CAPSULE | Refills: 0 | Status: SHIPPED | OUTPATIENT
Start: 2023-02-07 | End: 2023-01-01

## 2023-02-07 NOTE — TELEPHONE ENCOUNTER
Scheduled for med check 3/3/23  Also requesting Acetaminophen with codeine  Please call guardian Johnson when refills are sent  Alma Delia SCHULZ RN

## 2023-02-07 NOTE — TELEPHONE ENCOUNTER
Routing refill request to provider for review/approval because:  Please see below.     Aleksey REEVES RN

## 2023-02-07 NOTE — TELEPHONE ENCOUNTER
[place] suspicious activity [place] .     Prescriptions sent after noting follow up appt has been made (overdue).     Joel Wegener,MD

## 2023-02-11 DIAGNOSIS — G62.9 PERIPHERAL POLYNEUROPATHY: ICD-10-CM

## 2023-02-13 RX ORDER — GABAPENTIN 400 MG/1
CAPSULE ORAL
Qty: 1 CAPSULE | Refills: 0 | OUTPATIENT
Start: 2023-02-13

## 2023-03-02 ENCOUNTER — VIRTUAL VISIT (OUTPATIENT)
Dept: FAMILY MEDICINE | Facility: CLINIC | Age: 78
End: 2023-03-02
Payer: COMMERCIAL

## 2023-03-02 DIAGNOSIS — I10 ESSENTIAL (PRIMARY) HYPERTENSION: ICD-10-CM

## 2023-03-02 DIAGNOSIS — F11.90 CHRONIC, CONTINUOUS USE OF OPIOIDS: ICD-10-CM

## 2023-03-02 DIAGNOSIS — M25.551 PAIN OF RIGHT HIP JOINT: ICD-10-CM

## 2023-03-02 DIAGNOSIS — F02.80 DEMENTIA ASSOCIATED WITH OTHER UNDERLYING DISEASE WITHOUT BEHAVIORAL DISTURBANCE (H): ICD-10-CM

## 2023-03-02 DIAGNOSIS — Z79.899 MEDICATION MANAGEMENT: ICD-10-CM

## 2023-03-02 DIAGNOSIS — G81.01 FLACCID HEMIPLEGIA OF RIGHT DOMINANT SIDE DUE TO NONCEREBROVASCULAR ETIOLOGY (H): ICD-10-CM

## 2023-03-02 DIAGNOSIS — G62.9 PERIPHERAL POLYNEUROPATHY: ICD-10-CM

## 2023-03-02 DIAGNOSIS — S06.9X9S TRAUMATIC BRAIN INJURY WITH LOSS OF CONSCIOUSNESS, SEQUELA (H): ICD-10-CM

## 2023-03-02 DIAGNOSIS — G40.909 SEIZURE DISORDER (H): ICD-10-CM

## 2023-03-02 DIAGNOSIS — G89.4 CHRONIC PAIN SYNDROME: Primary | ICD-10-CM

## 2023-03-02 PROCEDURE — 99442 PR PHYSICIAN TELEPHONE EVALUATION 11-20 MIN: CPT | Mod: 95 | Performed by: FAMILY MEDICINE

## 2023-03-02 NOTE — PROGRESS NOTES
Wen is a 78 year old who is being evaluated via a billable telephone visit.      What phone number would you like to be contacted at? [phone]  How would you like to obtain your AVS? Mail a copy    Distant Location (provider location):  On-site    Assessment & Plan     Chronic pain syndrome  Due to have suzan review/sign csa.  Will mail out and have her review/sign/mail back.   No change.   [place] suspicious activity [place]   Due for utox, will order.     Recommend community paramedic referral for lab draw of items below, blood pressue check and immunizations (shingrix, flu, covid booster)   follow up six months for annual wellness/med check hopefully will be able to do in person.     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   - Community Paramedic Referral    Dementia associated with other underlying disease without behavioral disturbance (H)    - Community Paramedic Referral    Seizure disorder (H)    - Phenytoin level  - Community Paramedic Referral    Traumatic brain injury with loss of consciousness, sequela (H)  Reason suzan is caregiver.   - Community Paramedic Referral    Essential (primary) hypertension  - last blood pressue on file not at goal.   - COMPREHENSIVE METABOLIC PANEL  - Community Paramedic  "Referral    Medication management    - CBC with platelets  - Drug Abuse Screen Panel 13, Urine (Pain Care Package) - lab collect  - Community Paramedic Referral               BMI:   Estimated body mass index is 25.79 kg/m  as calculated from the following:    Height as of 9/27/22: 1.651 m (5' 5\").    Weight as of 9/27/22: 70.3 kg (155 lb).           No follow-ups on file.    Joel Daniel Wegener, MD  [facility]    Luisa Carver is a 78 year old, presenting for the following health issues:  Recheck Medication      HPI       Visit with shadi and suzan today (niece who is caregiver.) Main reason for visit is to have meds updated. Tylenol with codeine refills.  Uses sparingly (suzan manages) but continues to feel that when shadi is in high level of pain that this works for her, settles her down both in terms of behavior/cooperation and just sense of wellbeing.  Wondering what else might be due for and if could have home visi tif needed since difficult to travel with shadi and she gets very anxious wearing a mask (will rip it off) and doesn't cognitively understand why mask is needed.         Review of Systems         Objective           Vitals:  No vitals were obtained today due to virtual visit.    Physical Exam   healthy, alert and no distress  Speech dysarthric at baseline.                 Phone call duration: 12 minutes    "

## 2023-03-06 ENCOUNTER — PATIENT OUTREACH (OUTPATIENT)
Dept: CARE COORDINATION | Facility: CLINIC | Age: 78
End: 2023-03-06
Payer: COMMERCIAL

## 2023-03-06 NOTE — PROGRESS NOTES
Community Paramedic Program  Community Health Worker Outreach    UTC/Voicemail    Outreach attempted x 1.      Left message on pt's brother/guardian Toan's voicemail with call back information and requested return call.    CHW Follow-up Plan: will try to reach patient again in 1-2 business days.    Note: Available 3/13 pm with CP3 for labs, bp check & flu shot? COVID booster on 3/15 during clinic? Would pt/pt's brother like to split up between 2 visits?    Referral source: Pt's PCP  Reason(s) for visit: Med Check/Setup    Home/Safety Assessment    Labs/Injections (please ensure orders have been placed in Epic)   Goals for visit(s): Clinic Appointment (face to face) Attendance   How often should patient be seen: Other   Preference on when patient should be seen: Within 1 Month     Lab order(s) placed: yes (3/2/23), includes urine sample    Timed or fasting lab requested: yes, trough level preferred for phenytoin lab

## 2023-03-08 NOTE — PROGRESS NOTES
"[facility] Paramedic Program   [facility]    Referred for: bivalent booster    Date and type of last vaccine/booster: 2/10/2022, first monovalent booster    Signed order for Pfizer vaccine or bivalent booster in chart?: yes    Visit scheduled for Wednesday, March 15th at 12 pm with CP [place]    Additional information:   Spoke with pt's brother and guardian Toan. Noted his vm msg back to me and confirmed referral information with him. Gave him a brief description of our program and what to expect during a visit. Reviewed pt's referral requests and asked if we could schedule two, separate CP visits, one of which would be during our next traveling COVID clinic on March 15th.     Toan agreed to schedule a CP visit on March 15th so pt can receive her bivalent COVID booster. He also agreed to schedule a CP visit on Thursday, March 16th at 10 am for a timed lab draw and so pt can receive her flu shot. Toan confirmed that pt takes her phenytoin medication at bedtime. I requested that Toan hold pt's morning medications until after the CP visit, and he agreed to pass this information along to pt's PCA.    Confirmed pt's home address. He said that pt's PCA will be present during both CP visits. He said pt has a small \"barky but friendly dog\" at home. He asked that the CP park on the lefthand side of the street and come to the front door. He said pt's home has a ramp on the front entrance. Toan asked if the CP could call him when she's on the way to pt's home on Thursday, March 16th. I agreed to pass this request along to the CP and shared her name with pt's brother.    Confirmed that Toan has my direct dial. Asked him to reach out with questions or to reschedule if needed. He agreed and expressed appreciation for the CP program. He declined having any questions or any other information he wanted us to know about Wen.    Routing to pt's PCP and CP.    Yazmin Emerson  Community Health Worker  Community " Paramedic program  [phone]  [email or web address]

## 2023-03-14 NOTE — PROGRESS NOTES
Community Paramedic Program  Community Health Worker Follow Up    Intervention and Education during outreach:     Spoke with pt's brother/guardian Toan about pt's scheduled CP visits this week. Confirmed visit time for tomorrow, March 15th, at 12 pm at pt's home in [place] for CP to administer bivalent COVID booster.    Toan asked if there's any way to move back the visit on Thursday, March 16th from 10 am to 3 pm. Confirmed with pt's brother that CP will do lab draw and administer pt's flu shot during the visit. Toan said pt's aide will be present if it's later in the afternoon and said that would work better for him if possible. I referenced that we scheduled a 10 am visit due to one of the labs that pt's PCP ordered being timed (phenytoin). Toan said that pt only takes the phenytoin at bedtime, which is actually midnight for pt.    I offered to follow up with the CP to pass along Toan's request. He asked for a call back today by noon or 1 pm to confirm, and I agreed.     Routing to the CP for follow up.     CHW Plan:  1. CP CHW will discuss potentially moving back pt's visit on Thursday and confirm if possible, based on updated information about when pt takes her phenytoin medication.  2. CP CHW will follow up with pt's brother by phone today.

## 2023-03-16 NOTE — PROGRESS NOTES
"Community Paramedic One Time Visit    March 16, 2023; 12:08 PM    Ban Petersen is a 78 year old year old adult being seen at home visit    Present at appointment:      Vitals:  BP Readings from Last 1 Encounters:   03/15/23 118/62     Pulse Readings from Last 1 Encounters:   03/15/23 71     Wt Readings from Last 1 Encounters:   09/27/22 70.3 kg (155 lb)     Ht Readings from Last 1 Encounters:   09/27/22 1.651 m (5' 5\")         Clinical Concerns:  Current Medical Concerns:  Need for (Vaccination; Lab draw/sample)    Education Provided to patient: Monitor for signs and symptoms of allergic reaction to vaccine, call 911 if needed   Flu Shot given: Yes  COVID Vaccine Given: No  Lab draw or specimen collection: Yes      Plan:     Time spent with patient: 45    The patient meets one or more of the following criteria:  * Requires services to prevent readmission to a nursing home or hospital    Acute concern/Follow-up recommendations: One- time visit, will need new CP referral if additional needs arise    Issues for Provider to follow up on: Community Paramedic visited patient in home, provided one-time visit.     Last medications taken at 11:00 pm on 3/15/23.    A Flu vaccination was administered and venous blood draw was obtained without issue.    "

## 2023-03-16 NOTE — PROGRESS NOTES
"Community Paramedic One Time Visit    March 15, 2023; 1200 PM    Ban Petersen is a 78 year old year old adult being seen at home visit    Present at appointment:  Patient, brother    Vitals:  BP Readings from Last 1 Encounters:   03/16/23 112/60     Pulse Readings from Last 1 Encounters:   03/16/23 70     Wt Readings from Last 1 Encounters:   09/27/22 70.3 kg (155 lb)     Ht Readings from Last 1 Encounters:   09/27/22 1.651 m (5' 5\")         Clinical Concerns:  Current Medical Concerns:  Need for (Vaccination; Lab draw/sample)    Education Provided to patient: Monitor for signs and symptoms of allergic reaction to vaccine, call 911 if needed   Flu Shot given: No  COVID Vaccine Given: Yes  Lab draw or specimen collection: No      Plan:     Time spent with patient: 30    The patient meets one or more of the following criteria:  * Requires services to prevent readmission to a nursing home or hospital    Acute concern/Follow-up recommendations: One time visit, will need new CP referral if additional needs arise    Issues for Provider to follow up on: Community Paramedic visited patient in home, provided one-time visit.     A Covid vaccination was administered without issue.  Flu vaccination and blood draw will be completed tomorrow.    "

## 2023-03-22 NOTE — PROGRESS NOTES
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 VPT less likely given FA findings, US rules out osteoma    -  s/p anti-VEGF left eye x3 (last 12/21/22, 6 weeks ago)  - plan repeat avastin left eye today    # Pseudophakia both eyes   Cataract extraction intraocular lens left eye 9.27.22  Intraocular lens in good position  Doing well  With posterior capsular opacity (PCO)  observe    Plan:  avastin inj left eye 02/01/23   Recommend observation 03/22/23   Consider inj in the future if recurrent heme   Retina detachment and endophthalmitis precautions were discussed with the patient and family who was asked to return if any of the those occur  Follow up in 2 months with optos and Optical Coherence Tomography     Low vision prescription with xiang        ~~~~~~~~~~~~~~~~~~~~~~~~~~~~~~~~~~   Complete documentation of historical and exam elements from today's encounter can be found in the full encounter summary report (not reduplicated in this progress note).  I personally obtained the chief complaint(s) and history of present illness.  I confirmed and edited as necessary the review of systems, past medical/surgical history, family history, social history, and examination findings as documented by others; and I examined the patient myself.  I personally reviewed the relevant tests, images, and reports as documented above.  I formulated and edited as necessary the assessment and plan and discussed the findings and management plan with the patient and family    Mary Chacko MD  Professor of Ophthalmology  Vitreo-Retinal surgeon   Department of Ophthalmology and Visual Neurosciences   [facility]  Phone: [phone]   Fax: [phone]

## 2023-03-22 NOTE — NURSING NOTE
Chief Complaints and History of Present Illnesses   Patient presents with     Macular Degeneration Follow Up     Chief Complaint(s) and History of Present Illness(es)     Macular Degeneration Follow Up            Laterality: both eyes    Onset: 7 weeks ago          Comments    Aminta VINSON 8:24 AM March 22, 2023

## 2023-03-23 NOTE — TELEPHONE ENCOUNTER
Forms/Letter Request    Type of form/letter:   [facility] [employer] [facility]    Incontinence supply order    Pull up prevail large     Have you been seen for this request: N/A    Do we have the form/letter: Yes:   Faxed to the [facility]    When is form/letter needed by: n/a    How would you like the form/letter returned:   Fax: [phone]    Patient Notified form requests are processed in 3-5 business days:No    Okay to leave a detailed message?:  N/a    Placed on Dr. Wegener's desk.

## 2023-03-24 NOTE — TELEPHONE ENCOUNTER
Forms faxed to [facility] medical fax # [phone] and copy sent to stat scan.     Rebecca KWAN  TC

## 2023-04-21 NOTE — PROGRESS NOTES
[facility] Care Coordination Contact    First attempt:   Called family brotherToan, to schedule annual HRA home visit. Left a message requesting a return call to schedule HRA.   Ifrah Jewell RN  [facility]   [phone]

## 2023-04-24 NOTE — PROGRESS NOTES
Encounter opened due to Regulatory Compass Precious Update to open FVP Program.    Melita Lange  Case Management Specialist  [facility]  [phone]

## 2023-04-24 NOTE — PROGRESS NOTES
Encounter opened due to Regulatory Compass Precious Update to close FVP Program.    Melita Lange  Case Management Specialist  [facility]  [phone]

## 2023-05-02 NOTE — PROGRESS NOTES
Error. See encounter dated 4/24/23  Ifrah Jewell RN  [facility]   [phone]

## 2023-05-03 NOTE — PROGRESS NOTES
[facility] Care Coordination Contact    Called member's guardian/ brother, Toan, to schedule annual HRA home visit. HRA has been scheduled for Monday, 5/15/23, at 3:00 PM.   Ifrah Jewell RN  [facility]   [phone]

## 2023-05-03 NOTE — PROGRESS NOTES
[facility] Care Coordination Contact  Second attempt (see encounter dated 4/21/23 for first attempt).  Called family brother/guardian, Toan Sanchez, to schedule annual HRA home visit. Left a message requesting a return call to schedule HRA.   Ifrah Jewell RN  [facility]   [phone]

## 2023-05-15 NOTE — PROGRESS NOTES
[facility] Care Coordination Contact    Member's brother, Toan, who is also her guardian called to ask about coverage for Boost or Ensure for Wen. He was told to contact me by [facility].    Per Toan, Wen drinks a Boost or Ensure every morning because he thinks she needs the vitamins and minerals. I explained that there are criteria to meet and PCP needs to complete paperwork, stating it is medically necessary, for MA to cover Ensure or Boost. Advised we can discuss more at visit tomorrow and that I will send a message to PCP once I know what else Wen might need.    Toan voiced understanding and agreed with the plan.  Ifrah Jewell RN  [facility]   [phone]

## 2023-05-25 NOTE — PROGRESS NOTES
CC: follow up wet Age related macular degeneration left eye   Here for follow up with dilation  Postoperative status post Cataract extraction intraocular lens left eye 9.27.22    HPI Ban Petersen is a 78 year old female who presents today for follow up Cataract extraction intraocular lens left eye and Age related macular degeneration; possible Avastin inj    Imaging:  Optical Coherence Tomography 05/25/23    Right eye: subretinal fibrosis and intraretinal fluid, stable  Left eye: unable to obtain    ultrasound 05/25/23   Lesion measures Lesion measures today at: 2.50 x 10.45T x 10.49L  Medium to high internal reflectivity and stable compared to prior one    ASSESSMENT:     # advanced wet Age related macular degeneration with disciform scar both eyes   Right eye: appears stable- likely longstanding disciform scar; no heme- observe  Left eye: had active exudation of left eye and subretinal heme  05/25/23  fundus showed new heme  Risks, benefits and alternatives discussed with patient and brother and the plan is to observe today given no improvement of vision  Consider inj as needed if worsening heme or significant exudation    # 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 without return to prior conscious level, patient surviving, sequela      Plan:  Recommend observation 05/25/23   Consider second opinion with   [facility] in 4 months  Consider inj in the future if worsening heme and symptoms  Retina detachment and endophthalmitis precautions were discussed with the patient and family who was asked to return if any of the those occur  Follow up in 2 months with optos and Optical Coherence Tomography     Low vision prescription with xiang     ~~~~~~~~~~~~~~~~~~~~~~~~~~~~~~~~~~   Complete documentation of historical and exam elements from today's encounter can be found in the full encounter summary report (not reduplicated in this progress note).  I personally obtained the chief complaint(s) and history of present illness.  I confirmed and edited as necessary the review of systems, past medical/surgical history, family history, social history, and examination findings as documented by others; and I examined the patient myself.  I personally reviewed the relevant tests, images, and reports as documented above.  I formulated and edited as necessary the assessment and plan and discussed the findings and management plan with the patient and family    Mary Chacko MD  Professor of Ophthalmology  Vitreo-Retinal surgeon   Department of Ophthalmology and Visual Neurosciences   [facility]  Phone: [phone]   Fax: [phone]

## 2023-05-25 NOTE — Clinical Note
Ryder Hernandes, Could you pls take a look at the fundus pic and ultrasound (A+B scan) of this patient's left eye? I thought choroidal neovascular membrane PERCH and less likely MM, because of  no low internal reflectivity and the other eye has disciform scar. Can you see her for follow up in approximately 3-4 months to be sure I am not missing anything And double check it is not MM?

## 2023-05-25 NOTE — PROGRESS NOTES
Assessment/Plan  (H35.4804) Exudative age-related macular degeneration of both eyes with active choroidal neovascularization (H)  (primary encounter diagnosis)  Comment: Patient follows with retina clinic. This is most likely longstanding given the presence of scarring.   Plan: Discussed findings with patient and her brother in detail. Expectations for visual improvement with glasses today should be limited, as retinal disease remains most significant factor for her blurred vision. Polycarbonate lenses are recommended due to monocular status. Patient defers low vision OT referral today, as her goals are currently limited to distance viewing. Recommended establishing a case with [facility] for additional resources. Follow up in this clinic as needed.       (Z96.1) Pseudophakia of both eyes  Plan: Monitor.     (H52.4) Presbyopia  Plan: REFRACTION [[id number]]        See above note.           60 minutes were spent on the date of the encounter doing chart review, history and exam, documentation, and further activities as noted above.    Complete documentation of historical and exam elements from today's encounter can  be found in the full encounter summary report (not reduplicated in this progress  note). I personally obtained the chief complaint(s) and history of present illness. I  confirmed and edited as necessary the review of systems, past medical/surgical  history, family history, social history, and examination findings as documented by  others; and I examined the patient myself. I personally reviewed the relevant tests,  images, and reports as documented above. I formulated and edited as necessary the  assessment and plan and discussed the findings and management plan with the  patient and family.    Don Koenig OD

## 2023-05-26 NOTE — PROGRESS NOTES
[facility] Care Coordination Contact    [facility] Home Visit Assessment     Home visit for Health Risk Assessment with Ban Petersen completed on May 16, 2023    Type of residence:: Apartment - handicap accessible      Assessment completed with:: Patient, Family    Current Care Plan  Member currently receiving the following home care services:     Member currently receiving the following community resources: PCA, County Programs, County Worker, DME, Transportation Services      Medication Review  Medication reconciliation completed in Epic: Yes  Medication set-up & administration: Family/informal caregiver sets up daily.  Family caregiver administers medications.  Medication Risk Assessment Medication (1 or more, place referral to MTM): N/A: No risk factors identified  MTM Referral Placed: No: No risk factors idenified  Member has an order for Tylenol #3, but she rarely takes it.   Member has not been getting vitamin D. Will send note to PCP to ask if she should resume this.      Mental/Behavioral Health   Depression Screening: Cognitively unable to do screening due to cognitive issues.       Mental health DX:: No        Falls Assessment:   Fallen 2 or more times in the past year?: No   Any fall with injury in the past year?: No    ADL/IADL Dependencies:   Dependent ADLs:: Bathing, Dressing, Eating, Grooming, Incontinence, Positioning, Transfers, Wheelchair-with assist, Toileting  Dependent IADLs:: Cleaning, Cooking, Laundry, Shopping, Meal Preparation, Medication Management, Money Management, Transportation, Incontinence    [facility] Health Plan sponsored benefits: Shared information re: Silver Sneakers/gym memberships, ASA, Calcium +D.    PCA Assessment completed at visit: Yes Annual PCA assessment indicated 46 units per day of PCA. This is the same as the previous assessment.     Elderly Waiver Eligibility: Yes-will continue on EW    Care Plan & Recommendations:  Will renew elderly waiver and  continue to authorize PCA services. Recommend that Wen has a face to face visit with PCP, as it has been over one year. Wen's brother wants to have her Ensure paid for by insurance or waiver, but she does not meet MA criteria. In order for her waiver to cover, Dr. Wegener will need to determine whether she meets that criteria. Sent message to PCP to ask about this. Wen goes out to ophthalmology appointments, so she is able to make it to the clinic with special transportation and help from her PCA.    Writer will ask Dr. Wegener if Wen should resume taking Vit D. Toan is not sure when they stopped giving it to her, but her Vit D level was very low (20.1) when checked in 2020 and I do not see that it was rechecked.    See [facility] for detailed assessment information.    Follow-Up Plan: Member informed of future contact, plan to f/u with member with a 6 month telephone assessment.  Contact information shared with member and family, encouraged member to call with any questions or concerns at any time.    [facility] care continuum providers: Please see Snapshot and Care Management Flowsheets for Specific details of care plan.    This CC note routed to PCP.  Ifrah Jewell RN  [facility]   [phone]

## 2023-05-26 NOTE — LETTER
[address], [address]  [place] [address]  Phone:  [phone]  Fax:  [phone]      May 26, 2023    ETIENNE GREWAL  [address] [place] [id number]    Dear Etienne,    Enclosed is a copy of your completed PCA Assessment and Service Plan.  This is for your records and no action is required by you.  If you have additional questions regarding your assessment please contact me at [phone]. If you feel that your needs are not being met, please contact the Clinical Supervisor at [phone].    Sincerely,    Ifrah Jewell RN  [phone]  [email or web address]            Enclosure:  Completed PCA assessment

## 2023-05-26 NOTE — PROGRESS NOTES
[facility] Care Coordination Contact      [facility]:  Emailed completed PCA assessment to [facility].  Faxed copy of PCA assessment to PCA Agency and mailed copy to member.  Faxed MD Communication to PCP.   Mailed PCA sig sheet w/SASE for signature and return.    Melita Lange  Case Management Specialist  [facility]  [phone]

## 2023-05-30 NOTE — TELEPHONE ENCOUNTER
"Thank you.  Yes please ask them to schedule visit to address these issues and we can talk them through.  I believe we will be able to make an argument that she meets the criteria for nutritional supplements.     Preferably this would be in-person and we can do any labs needed for the vit d/nutritiional eval at that time.     Otherwise we can do a virtual visit and coordinate labs through community paramedic.     If they are able to do a video visit (one someone's phone perhaps) then we could do the official \"annual wellness\" visit and address these items.     Otherwise a phone visit would work as well.     Joel Wegener,MD       "

## 2023-06-01 NOTE — PROGRESS NOTES
[facility] Care Coordination Contact    Called [facility] PCA agency and left detailed voicemail for Rebecca CAMP, member's Qualified Professional. Informed her that I did member's annual PCA assessment and she qualifies for the same hours as last year.  Ifrah Jewell RN  [facility]   [phone]     no

## 2023-06-01 NOTE — PROGRESS NOTES
[facility] Care Coordination Contact    Spoke with Sandra, member's niece/PCA about the followin. Advised to schedule clinic visit with Dr. Wegener to discuss Ensure. He thinks he will be able to make a case for it to be covered by insurance.  Advised member needs to be weighed and have labs drawn, so it would be best to go into the clinic.    2.  Vit D will be checked at the clinic visit.    3. Decreased pull-ups from 7 packs to 4 packs per month - emailed APA    4. Increased wipes from 3 boxes to 4 boxes per month -emailed APA    Called [facility] to ask when Wen got her last wheelchair. Per Amarilis at [facility], previous W/C was delivered to member on 2018 and she is eligible for a new one tomorrow, 23.    I informed Sandra of the above. Also advised her to schedule an appt with Dr. Wegener ASAP, so he can do the face to face visit and order the W/C, as well as the paperwork for Ensure to be paid for by waiver.  Sandra voiced understanding and agreed with the plan.     Routing to Dr. Wegener for his information.     Ifrah Jewell RN  [facility]   [phone]

## 2023-06-01 NOTE — Clinical Note
Hi Dr. Wegener, I told Wen's niece, Sandra, to schedule an appt with you ASAP because her wheelchair somehow got bent. It doesn't affect the seat or how she sits, but it makes pushing the chair very difficult. It has been exactly 5 years since she got the W/C, so she can get a new one with a F2F & order from you.  Thanks, Ifrah

## 2023-06-02 NOTE — LETTER
June 2, 2023      ETIENNE GREWAL  [address] [place] [id number]      Dear Etienne:    At [facility], we re dedicated to improving your health and wellness. Enclosed is the Care Plan developed with you on 5/16/2023. Please review the Care Plan carefully.    As a reminder, during your visit we talked about:  Ways to manage your physical and mental health  Using health care to maintain and improve your health   Your preventive care needs     Remember to contact your care coordinator if you:  Are hospitalized, or plan to be hospitalized   Have a fall    Have a change in your physical or mental health  Need help finding support or services    If you have questions, or don t agree with your Care Plan, call me at [phone]. You can also call me if your needs change. TTY users, call the [place] [employer] at (368) or 1-533.399.1484 (sxwnqm-dw-fatmgf relay service).    Sincerely,    Ifrah Jewell RN  [phone]  [email or web address] (Hospitals in [place]) is a health plan that contracts with both Medicare and the [place] Medical Assistance (Medicaid) program to provide benefits of both programs to enrollees. Enrollment in [facility] depends on contract renewal.    [id number] accepted    [id number] (07/2022)

## 2023-06-02 NOTE — PROGRESS NOTES
[facility] Care Coordination Contact    Received after visit chart from care coordinator.  Completed following tasks: Mailed copy of care plan to client, Updated services in Database, Submitted referrals/auths for supplies/wipes, Mailed copy of POC signature sheet for member to sign and return in SASE  and Mailed Safe Medication Disposal    and Provider Signature - No POC Shared:  Member indicates that they do not want their POC shared with any EW providers.     Melita Lange  Case Management Specialist  [facility]  [phone]

## 2023-06-09 NOTE — PROGRESS NOTES
"Wen is a 78 year old who is being evaluated via a billable video visit.      How would you like to obtain your AVS? MyChart  If the video visit is dropped, the invitation should be resent by:   Will anyone else be joining your video visit? 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     Current wheelchair  - normal manual wheelchair - broken, needs replacement.     Wen can transfer with assistance but unable to walk due to items listed below (hemiparesis, flaccid hemepligia, falls ) Cannot push wheelchair herself due to arm weakness however has family/caregivers available for this.     She has had multiple falls in past when attempting ambulation or transfer on own.     Her flaccid hemiplegia impairs he ability to accomplish moiblity related activities of daily living safely due to high fall risk and her home does have adequate acess, maneurering space and surfaces that her caregivers can properly propel the wheelchair for her.  She always has a caregiver present while using.     The use of the chair will greatly improve her ability to participate in activities of daily living such as bathing (getting to bathroom) and toileting.  Finally she has not expressed an unwillingness to use.     Will have dme order faxed and if further paperwork needed can address if sent by DME office.   - Wheelchair Order for DME - ONLY FOR DME    Hemiparesis of right dominant side, unspecified hemiparesis etiology (H)    - Wheelchair Order for DME - ONLY FOR DME    Recurrent falls    - Wheelchair Order for DME - ONLY FOR DME               BMI:   Estimated body mass index is 25.79 kg/m  as calculated from the following:    Height as of 9/27/22: 1.651 m (5' 5\").    Weight as of 9/27/22: 70.3 kg (155 lb).           Joel Daniel Wegener, MD  [facility]    Subjective   Wen is a 78 year " old, presenting for the following health issues:  Wheelchair Assessment (Wheelchair broken)        6/9/2023     3:57 PM   Additional Questions   Roomed by Palmira ZAMORA   Accompanied by Johnson Sotomayor - Brother     History of Present Illness       Reason for visit:  Needs new wheelchair    She eats 2-3 servings of fruits and vegetables daily.She consumes 3 sweetened beverage(s) daily.She exercises with enough effort to increase her heart rate 9 or less minutes per day.  She exercises with enough effort to increase her heart rate 3 or less days per week.   She is taking medications regularly.    Today's PHQ-9         PHQ-9 Total Score: 0    PHQ-9 Q9 Thoughts of better off dead/self-harm past 2 weeks :   Not at all    How difficult have these problems made it for you to do your work, take care of things at home, or get along with other people: Not difficult at all  Today's BIA-7 Score: 0               Review of Systems         Objective    Vitals - Patient Reported  Pain Score: Severe Pain (7)  Pain Loc: Hip        Physical Exam   GENERAL: Healthy, alert and no distress  EYES: Eyes grossly normal to inspection.  No discharge or erythema, or obvious scleral/conjunctival abnormalities.  RESP: No audible wheeze, cough, or visible cyanosis.  No visible retractions or increased work of breathing.    SKIN: Visible skin clear. No significant rash, abnormal pigmentation or lesions.  NEURO: Cranial nerves grossly intact.  Mentation and speech appropriate for age.    Dysarthric speech at baseline.   Answers for HPI/ROS submitted by the patient on 6/9/2023  If you checked off any problems, how difficult have these problems made it for you to do your work, take care of things at home, or get along with other people?: Not difficult at all  PHQ9 TOTAL SCORE: 0  BIA 7 TOTAL SCORE: 0  What is the reason for your visit today? : Needs new wheelchair  How many servings of fruits and vegetables do you eat daily?: 2-3  On average, how  many sweetened beverages do you drink each day (Examples: soda, juice, sweet tea, etc.  Do NOT count diet or artificially sweetened beverages)?: 3  How many minutes a day do you exercise enough to make your heart beat faster?: 9 or less  How many days a week do you exercise enough to make your heart beat faster?: 3 or less  How many days per week do you miss taking your medication?: 0                  Video-Visit Details    Type of service:  Video Visit   Video Start Time:  4:46  Video End Time: 5:00    Originating Location (pt. Location): Home    Distant Location (provider location):  On-site  Platform used for Video Visit: Pricilla

## 2023-06-13 NOTE — TELEPHONE ENCOUNTER
Called patient and lvm to call clinic back at [phone].   Need to know where to send DME order to.    Rebecca GRAY

## 2023-06-13 NOTE — PROGRESS NOTES
[facility] Care Coordination Contact    Received PCA Sig page, signed by brother Lobato and EMBER, but not the POC sig page.  Emailed CHON Hua, to resend the POC Sig page to Johnson, which is the second attempt.    Ifrah Jewell RN  [facility]   [phone]

## 2023-06-15 NOTE — PROGRESS NOTES
Per CC, mailed POC sig sheet w/ELIECER attn: Johnson for signature    Melita Lange  Case Management Specialist  [facility]  [phone]

## 2023-06-20 NOTE — PROGRESS NOTES
[facility] Care Coordination Contact     Received email with PCA hours, authorized by [facility], per their Secure Site. Member receives 11.5 hours per day of PCA services. See details regarding units below:      7/1/2023 11/30/2023 7,038 units         12/1/2023 5/31/2024   8,418 units     Ifrah Jewell RN  [facility]   [phone]

## 2023-06-20 NOTE — TELEPHONE ENCOUNTER
Care coordinator sent message to Sandra, pt's niece, to ask if they want the W/C order sent to [facility] or to a different DME provider.    Will inform clinic TC when I hear back from Sandra.    Ifrah Jewell RN  [facility]   [phone]

## 2023-06-20 NOTE — PROGRESS NOTES
[facility] Care Coordination Contact    Noted that Dr. Wegener ordered a W/C on 6/9/23 but it does not look like it has been faxed to DME store yet.   Telephone encounter dated 6/13/23 read that the  called the patient's home and left voicemail asking where they would like the order sent.  It does not appear Wen's family/caregivers have called back yet.    Writer sent message to Sandra, member's niece, and asked if they want the W/C from [employer], which is the vendor they previously used.  Will inform TC at [facility] when I receive response from Sandra.  Ifrah Jewell RN  [facility]   [phone]

## 2023-06-23 NOTE — PROGRESS NOTES
[facility] Care Coordination Contact    Sent another message to marilyn Flores/SIX[employer], asking where clinic should send W/C order. [facility] or somewhere else?  Ifrah Jewell RN  [facility]   [phone]

## 2023-06-29 NOTE — PROGRESS NOTES
[facility] Care Coordination Contact  CC received notification of Emergency Room visit.  ER visit occurred on 6/27/23 [place] [facility] with Dx of Dizziness.    Dilantin level was subtherapeutic at ED:    Dilantin Free 0.48 (L) 1.00 - 2.00 mcg/mL     CC contacted family brother Toan, who is her POA and reviewed discharge summary.     Confirmed with Toan that they are giving Wen the Dilantin as ordered, 100 mg in AM and 200 mg in PM.     Member has a follow-up appointment with PCP: No   *Overdue for neurology visit. [facility] provider made a new neuro referral to [facility], but they would like to keep Wen's care in the [facility] system.     Member has had a change in condition: No  New referrals placed: No  Home Visit Needed: No  Care plan reviewed and updated.  PCP notified of ED visit via EMR.    Per Toan, they would like the W/C order sent to [facility], where her current one is from.  Will ask Dr. Wegener if he can enter a neuro referral in the [facility] system since [facility] provider referred her to [facility].  Toan said they would need an appt at 3:00 or later so marilyn Flores/PCA can accompany her. I advised him to let  know their preferences.  Order for W/C - they want to get it from [employer], so care coordinator securely emailed it to [employer] today.  Ifrah Jewell RN  [facility]   [phone]

## 2023-07-05 NOTE — PROGRESS NOTES
[facility] Care Coordination Contact  Received email from [facility] that the member's previous/current W/C was from the complex rehab team at [facility] where an OT will assess Wen's needs and be sure the chair fits properly. This is what she had done for her previous/current chair five years ago.  Ifrah Jewell RN  [facility]   [phone]

## 2023-07-05 NOTE — PROGRESS NOTES
[facility] Care Coordination Contact    See encounter dated 6/30 for more info regarding new wheel chair for member.  Ifrah Jewell RN  [facility]   [phone]

## 2023-07-07 NOTE — TELEPHONE ENCOUNTER
Emailed with [employer] and they received the W/C order from Dr. Wegener. They will have their Complex Rehab Team reach out to the family to schedule an appt with an OT for a seating eval and fitting.  Ifrah Jewell RN  [facility]   [phone]

## 2023-07-07 NOTE — PROGRESS NOTES
[facility] Care Coordination Contact  Received email from [facility] that the member's previous/current W/C was from the complex rehab team at [facility] where an OT will assess Wen's needs and be sure the chair fits properly. This is what she had done for her previous/current chair five years ago.    Called member's brother Toan to inform him of the above. Advised they should get a call from [facility]'s complex rehab team to schedule an appt.  Ifrah Jewell RN  [facility]   [phone]

## 2023-07-13 NOTE — PROGRESS NOTES
[facility] Care Coordination Contact    Routed message to Dr. Wegener regarding a new neurology referral. This was recommended again at member's ED visit on 6/29/23 as it has been over 2 years since her last neuro visit.     The member's brother, Toan, wants to keep her in the [facility] system, so I pended a new referral for PCP to complete and sign if he agrees with it.    Also texted marilyn Flores, to ask if [facility] scheduling with complex rehab team/OT.  Ifrah Jewell RN  [facility]   [phone]

## 2023-07-20 NOTE — PROGRESS NOTES
[facility] Care Coordination Contact    Neuro referral was signed on 7/13/23, but the order is still pending. Will check the status again in the next week.    This care coordinator will call the member's family to ask if she has been scheduled for the complex rehab appointment at [facility], to fit her for a new W/C.  Ifrah Jewell RN  [facility]   [phone]

## 2023-07-20 NOTE — PROGRESS NOTES
[facility] Care Coordination Contact  See encounter dated 7/13/23 for more information regarding W/C and [facility].  Closing this encounter.  Ifrah Jewell RN  [facility]   [phone]

## 2023-07-21 NOTE — TELEPHONE ENCOUNTER
Forms/Letter Request    Type of form/letter:   Rehab Therapy Evaluation Referral Request    Manual Wheelchair    Have you been seen for this request: N/A    Do we have the form/letter: Yes:   Faxed to the [facility]    Who is the form from?   [facility] [employer]     Where did/will the form come from? form was faxed in    When is form/letter needed by: n/a    How would you like the form/letter returned:   Fax: [phone]    Patient Notified form requests are processed in 3-5 business days:No    Okay to leave a detailed message?:  N/a    Placed on Dr. Wegener's desk.

## 2023-07-24 NOTE — TELEPHONE ENCOUNTER
Forms faxed to [facility] [employer] fax # [phone] and copy sent to stat scan.     Rebecca GRAY

## 2023-08-08 NOTE — PROGRESS NOTES
[facility] Care Coordination Contact  CC received notification of Emergency Room visit.  ER visit occurred on 8/2/23 at [facility] with Dx of pressure injury of sacral region, stage 1.    CC contacted family Toan Sanchez, brother and guardian, and Sandra Gabbie, niece and PCA,  and left a message requesting a return call.      Checking on Wen after her ED visit. Also asked them to let me know if they have scheduled an appt with [facility]'s Complex Rehab Team for new W/C seating eval. The W/C was ordered on 6/9/23, but as of yesterday, [facility] had not heard back from the family to get the eval done in order to get the new W/C to the member.     Member's brother Toan previously said the current W/C is bent and difficult to push, but does not affect how the member sits in it.  The reason for her ED visit is a concern, as it is related to a pressure injury, so it is very important for her to be evaluated by [facility] Complex Rehab Team to be properly fit for her new W/C.    Mary from [facility] left a voicemail for this care coordinator, stating they spoke with Toan on 7/5/23 and are waiting to see where Wen would like to have a seating eval for the new W/C.  Per Mary, she was going to  call Toan again on Friday, 8/4/23.    Ricky Reynolds is the ATP working on the intake at [facility] and her assistant is Humble Burrell - [phone] ([phone]). Or the rehab group number is option 4.  Writer has given Toan Singhn the phone number to schedule an eval for Wen at [facility] twice now.    Care plan reviewed and updated.  PCP notified of ED visit via EMR.  Ifrah Jewell RN  [facility]   [phone]

## 2023-08-11 NOTE — TELEPHONE ENCOUNTER
Reached out to patient's ECJohnson to schedule New Seizure per referral received on 07/13/23. No answer, left detailed message for a call back.

## 2023-08-23 NOTE — PROGRESS NOTES
[facility] Care Coordination Contact    See encounter dated 8/23/23 for further details. Closing this encounter.  Ifrah Jewell RN  [facility]   [phone]

## 2023-08-23 NOTE — PROGRESS NOTES
[facility] Care Coordination Contact    Left voicemail for Mary at [facility] in the Complex Rehab Dept. Asked for return call to discuss status of member's W/C that was ordered by Dr. Wegener in July.  The member needs a custom chair, like the type she got from [facility] five years ago.    Writer sent another text message to the member's brother, Toan, and niece/PCA, Sandra, asking them if Wen has had an appt at [facility] yet or if it has been scheduled. Advised them to schedule that if not already done.   Also reminded them to call neurology ([phone]) to schedule appt regarding member's seizures if they did not get a call to schedule.  Ifrah Jewell RN  [facility]   [phone]

## 2023-08-29 NOTE — TELEPHONE ENCOUNTER
Forms/Letter Request    Type of form/letter:   DME order for gloves    Gloves, vinyl (PF) - Medium    Have you been seen for this request: N/A    Do we have the form/letter:   Faxed to the [facility]    Who is the form from?   [facility]    Where did/will the form come from? form was faxed in    When is form/letter needed by: n/a    How would you like the form/letter returned:   Fax: [phone]    Patient Notified form requests are processed in 3-5 business days:No    Okay to leave a detailed message?:  n/a    Placed on Dr. Wegener's desk.

## 2023-09-01 NOTE — TELEPHONE ENCOUNTER
"  Forms/Letter Request    Type of form/letter:   Ordered date: 08/07/2023    Cushion, cross-cut w/anti slp cover  Back, 12-21\" W x 16L, Tension adjustable  Adjustable armrest  Adjustable armrest  Anti tipper  Anti tipper   Seatbelt     Have you been seen for this request: N/A    Do we have the form/letter:   Faxed to the [facility]    Who is the form from?   [facility] [employer]     Where did/will the form come from? form was faxed in    When is form/letter needed by: n/a    How would you like the form/letter returned:   Fax: [phone]    Patient Notified form requests are processed in 3-5 business days:No    Okay to leave a detailed message?:  n/a    Placed on Dr. Wegener's desk.    "

## 2023-09-01 NOTE — PROGRESS NOTES
[facility] Care Coordination Contact      See update below from Svitlana at [facility]. Re: member's W/C    From: Svitlana Hutchison <[email or web address]>   Sent: Friday, September 1, 2023 10:23 AM  We still are waiting on the F2F notes or medical records that go over the insurance criteria for the wheelchair. I have sent another request today to get those.    Writer emailed the F2F office visit note to [facility] today and reviewed telephone encounter from Dr. Wegener's office. They received a  fax from [facility] and it is on PCP's office to complete and then fax back.  Ifrah Jewell RN  [facility]   [phone]

## 2023-09-05 NOTE — TELEPHONE ENCOUNTER
Forms faxed to [facility] [employer] fax # [phone] and copy sent to stat scan.     Rebecca GRAY

## 2023-09-05 NOTE — PROGRESS NOTES
"[facility] Care Coordination Contact    Received call from Mary at the Complex Rehab Dept at [facility]. She said that Robbi Haase spoke with Johnson, member's brother and guardian, stating she just needed a \"basic manual w/c\". I told Mary the reason I've been trying to reach them is because the previous w/c Wen got from [facility] was customized for her.  She looked in the system and noted this as well and stated she would discuss with Ricky and they will reach out to member's family again.    Writer gave Mary my email so they can keep me informed or let me know if they want me to try to reach the family, etc.  Ifrah Jewell RN  [facility]   [phone]    "

## 2023-09-25 NOTE — TELEPHONE ENCOUNTER
Received another request for an addendum per records  per criteria on form  with new signature and date fax back to [phone]  [facility] Coordinator

## 2023-09-26 NOTE — TELEPHONE ENCOUNTER
Routing refill request to provider for review/approval because:  Drug not on the [facility] refill protocol   Last VV 06/2023  Alma Delia SCHULZ RN

## 2023-09-27 NOTE — TELEPHONE ENCOUNTER
Action 9/27/23 MV 3.28pm   Action Taken Imaging request faxed to [facility]     Action 10/4/23 MV 1.40p   Action Taken Images resolved in PACS       RECORDS RECEIVED FROM: internal   REASON FOR VISIT: seizures   Date of Appt: 10/3/23   NOTES (FOR ALL VISITS) STATUS DETAILS   OFFICE NOTE from referring provider Internal Dr Wegener @ [facility]:  7/13/23 encounter   DISCHARGE REPORT from the ER Care Everywhere [facility]:  6/27/23   MEDICATION LIST Internal    IMAGING  (FOR ALL VISITS)     CT (HEAD, NECK, SPINE) PACS [facility]:  CT Head 6/27/23  CT Head 5/12/22  CT Head 11/11/19

## 2023-10-03 PROBLEM — G40.209 PARTIAL EPILEPSY WITH IMPAIRMENT OF CONSCIOUSNESS (H): Status: ACTIVE | Noted: 2023-01-01

## 2023-10-03 NOTE — NURSING NOTE
Chief Complaint   Patient presents with    New Patient     BP (!) 145/81 (BP Location: Left arm, Patient Position: Sitting, Cuff Size: Adult Regular)   Pulse 64   Resp 12   LMP  (LMP Unknown)   SpO2 95%     ADILENEOLIVIA LEROY

## 2023-10-03 NOTE — LETTER
10/3/2023       RE: Ban Petersen  [address] [place] [id number]       Dear Colleague,    Thank you for referring your patient, Ban Petersen, to the [facility]. Please see a copy of my visit note below.      [facility]:  CONSULTATION          Service Date: 10/03/2023    I spent 83 minutes in this patient care, with 52 minutes in direct patient contact, and 31 minutes in chart review and document preparation on the day of the visit.         Again, thank you for allowing me to participate in the care of your patient.      Sincerely,    Shade Witt MD

## 2023-10-04 NOTE — PROGRESS NOTES
[facility] Care Coordination Contact    Toan, member's brother, left voicemail that they are still waiting on the wheelchair that Dr. Wegener ordered months ago.    Writer left a voicemail for [employer], asking for return call with status update on the W/C and informed Toan I am waiting to hear back from them.  Ifrah Jewell RN  [facility]   [phone]
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
## 2023-10-12 NOTE — PROGRESS NOTES
[facility] Care Coordination Contact    Spoke with Sandra at [facility] and they are waiting for more info from Dr. Wegener to be able to get Wen's new W/C out to her.    They said they have faxed a form several times, requesting more info in the 6/9/23 office visit note.  The form looks like this:  Healthcare Professional: JOEL WEGENER  [facility]  [address] [place] [address]  Phone: [phone] Fax: [phone]  Date: 09/25/2023 From: Svitlana  Phone: [phone] Fax: [phone]  [facility] #: [id number]  Primary Insurance: [employer]/MSHO (DOS  AFTER 1/1/2022)  Doc ID#: [id number]  Regarding your patient: ETIENNE GREWAL  Name  1945  Date of Birth    Dear JOEL WEGENER,  ? Please add an addendum that goes over the criteria below with new signature and date.  For insurance consideration of the Standard Manual Wheelchair () you have requested for this patient,  ALL of the following must be documented in their medical records:  1. Need  A mobility limitation that impairs the ability to accomplish mobility-related activities of daily living  (MRADLs) entirely, safely or within a reasonable time frame; AND  2. Use  The patient s home provides adequate access, maneuvering space, and surfaces and the patient can  safely self-propel the manual wheelchair or there is a caregiver who can assist; AND  3. Benefit  The use of a manual wheelchair will significantly improve the ability to participate in MRADLs and the  patient will use it on a regular basis in the home (has not expressed and unwillingness to use); AND  To expedite processing, please fax medical records and this cover page to: [phone]  This is a medical record request, which must include relevant ICD-10 diagnosis code(s) and  the prescriber s signature and date. The medical record may include records from hospitals, nursing  facilities, home health agencies, other healthcare professionals,  etc. (not all-inclusive). Providing this  information to a supplier is not a violation of HIPAA privacy rules and does not require a release.    If you don't have the form any longer, please [facility] ([phone]) and request they resend it. If you have it, please complete and fax back to them at the number on the form.    If there is something I can do to help, please let me know.  Thank you,  Ifrah Jewell RN care coordinator   [facility]   [phone]

## 2023-10-17 NOTE — TELEPHONE ENCOUNTER
Forms/Letter Request    Type of form/letter:  gloves vinyl  (pf) medium 3 qty    Have you been seen for this request: Yes      Do we have the form/letter: Yes:     Who is the form from? Home care    Where did/will the form come from? form was faxed in    When is form/letter needed by: asap    How would you like the form/letter returned: Fax : [phone]    Patient Notified form requests are processed in 3-5 business days:Yes    Okay to leave a detailed message?: Yes at Other phone number:  [phone]

## 2023-10-17 NOTE — PROGRESS NOTES
[facility] Care Coordination Contact    Received Epic message from Dr Wegener and he did the addendum to the 10/9/23 visit and his team faxed it back to [facility].    On 10/17/23, this care coordinator emailed Sandra at [facility] to ask if they have everything they need now to get Wen's W/C delivered ASAP.  Ifrah Jewell RN  [facility]   [phone]

## 2023-10-18 NOTE — TELEPHONE ENCOUNTER
Forms faxed to [facility] medical fax # [phone] and copy sent to stat scan.     Rebecca KWAN  TC

## 2023-10-25 NOTE — PROGRESS NOTES
[facility] Care Coordination Contact    Messages sent to Toan Flores, asking if Wen has received her new wheelchair from [facility].  Ifrah Jewell RN  [facility]   [phone]

## 2023-11-01 NOTE — PROGRESS NOTES
[facility] Care Coordination Contact    Message received from member's brother, Toan, and Wen got her new W/C.  Ifrah Jewell RN  [facility]   [phone]

## 2023-11-21 NOTE — PROGRESS NOTES
[facility] Care Coordination Contact      TRANSITIONS OF CARE (GRISELDA) LOG    GRISELDA tasks should be completed by the CC within one (1) business day of notification of each transition. Follow up contact with member is required after return to their usual care setting.  Note:  If CC finds out about the transitions fifteen (15) days or more after the member has returned to their usual care setting, no GRISELDA log is needed. However, the CC should check in with the member to discuss the transition process, any changes needed to the care plan and document it in a case note.     Member Name:  [facility] Name:  [facility]/Health Plan Member ID#: [id number]   Product: [facility] Care Coordinator Contact:   Ifrah Jewell -[phone], @[email or web address]/Care System: [facility]   Transition Communication Actions from Care Management Contact   Transition #1   Notification Date: 11/21/23 Transition Date:   11/20/23 Transition From: Home     Is this the member s usual care setting?               yes Transition To: Hospital, [facility] [place] [place]   Transition Type:  Unplanned    Documentation from conversation with the member/responsible party, provider, discharging and receiving facility:   Date: 11/21/23: Received notification of admission to hospital with dx of influenza A, pneumonia & sepsis.  When EMS arrived at member's home on 11/20/23, her temp was 104 and O2 sats were in the 80's. They started her on 4L O2 and transported her to [facility] [place].  CXR with concern for R sided pneumonia. Influenza test returned positive for Influenza A.      CC contacted Hospital /discharge planner, [facility] [phone]. Spoke with Vicky, ED care coordinator and the patient has been there since yesterday; waiting for an inpatient bed. She said she will be moving to Care [facility], but unsure when. The hospital is so full and they have to wait for a discharge so member can be moved to the unit.  Per Vicky, the SW on  "Care 2 is Carly and her number is [phone]. Left voicemail for Carly and asked for return call as needed.  CC reached out to family Toan, brother and SALMA,  regarding transition and offered support as needed.  Reviewed and updated care plan as needed.  Notified community service providers and placed services PCA ([facility]) on hold as needed.  Transition log initiated.   PCP, Wegener, Guillermo Corbin, notified of hospitalization via EMR.  11/22/23: Spoke with Neema, RN care coordinator at [facility], who wanted to confirm the member has a hospital bed at home, which she does. She informed writer that they are waiting for a few medical things before she can go, but they anticipate discharging the patient home with her family today. One thing they are waiting on is speech therapy eval, as member diagnosed with aspiration pneumonia. I asked if they can have PT & OT weigh in, as the member was able to transfer before hospitalization and Neema said she is total care right now. I said they need to discuss this with the member's brother, Toan, so he is aware and can decide if they are able to care for member at home or if they want to look into TCUs. Neema said to have Toan call her back if he wants to discuss TCU.   Also spoke with ember's brother/guardian, Toan, who is adamant about not having Wen go to a nursing facility because he doesn't think they would take good care of her. He said they will \"figure it out\" at home, which they have done in the past.    Hospital notes member has wounds that had not been evaluated by C RN yet.   [facility] RN note dated 11/21/23 reads:  Skin injuries were present--documented in avatar but also listed here:  - pressure injury coccyx and bilateral buttocks  - pressure injury left heel  - blister right ankle   - bruise left hand      Transition #2   Notification Date: 11/28/23 Thanksgiving holiday Transition Date:   11/22/23 Transition From: Hospital, [facility]     Is this the member s usual care setting? " "              no Transition To: Home   Transition Type:  Planned    Documentation from conversation with the member/responsible party, provider, discharging and receiving facility:   Date 11/28/23: On 11/27/23 (at 3:43 PM): Sandra, marilyn, left vmail on care coordinator's phone, stating member has a big blister on her left heel. She said they did not receive a discharge summary or any notes about the wound. Sandra asked me to contact Toan, member's brother, and said she thinks a nurse should assess member's heel. She said Wen is \"really really really weak\" and stated she also could use PT & OT.  Writer got this message on Tues, 11/28/23, as I do not work on Mondays. As written above, the hospital notes from 11/20-11/22/23 stated the member went in with blisters and wounds, but it appears they document in a different system that I do not have access to. Reviewed notes again and saw that home care was not ordered at discharge, as I had discussed with SERA Gallo CC [facility] on 11/22/23.   Date: 11/28/23: Received notification of transition to home.  CC contacted family brother/guardian, Toan, and niece/PCA, Sandra Josefa,  and spoke with both Toan and Sandra.   Member has a follow-up appointment with PCP in 7 days: No - they are bringing her back to the ED ([facility]) tonight.   Member has had a change in condition: Yes: temporarily deconditioned from influenza, pneumonia and hospitalization.   Member is Case Mix J and has 11.5 hours per day of PCA. Member currently needs total assist for repositioning, transferring and toileting, which she was able to assist with prior to hospitalization.  Home visit needed: care coordinator left voicemail for [facility], to see if they could make a visit ASA. Member's family are concerned about wounds on bilateral ankles, which they report are new since she was in the hospital. The hospital notes recorded her as already having wounds. Sandra expressed that she and " Wen's cousin massage her feet every day (prior to hospital stay) and she denies the presence of blisters or wounds before ED on 11/20/23.  Care plan reviewed and updated.  The following home based services PCA were resumed.  New referrals placed: Yes: RN Therapies: PT & OT eval and treat, as not referred at [place] [facility]  RN was looking for an agency who could accept the referral. [facility] said they could review the referral, but Wen would need an OV in order to do this. This care coordinator called SERA Gallo care coordinator at [facility]. Told her about the wounds and that I thought she was ordering home care for this patient at discharge. She said she would see what she could do about getting a home care order from hospitalist. I have not received follow-up call from her today and it is after 5:00 PM. Now that Wen is returning to the hospital, we will defer home care orders to them.  Transition log completed.   PCP, Wegener, Joel Daniel Irwin, notified of transition back to home via EMR.  11/29/23: Received message from marilyn Flores, stating they called 911 and the paramedics looked at Wen's wounds. They told the family they didn't think they looked bad and advised they keep her home. Sandra said they have 3 shifts of PCAs and are able to provide the cares she needs, including repositioning and turning her every 2 hours. The family and PCAs are confident they can care for the member at home at this time. They asked again about a home care nurse to help with wound care, which care coordinator agrees with. [facility] and they said to send a referral and they were unable to tell me if they could accept her with only her insurance type and services needed.   Writer asked Dr. Wegener if he is willing to sign home care order before he sees her next Tues, 12/5/23.           *RETURN TO USUAL CARE SETTING: *Complete tasks below when the member is discharging TO their usual care setting  within one (1) business day of notification..      For situations where the Care Coordinator is notified of the discharge prior to the date of discharge, the Care Coordinator must follow up with the member or designated representative to confirm that discharge actually occurred and discuss required GRISELDA tasks as outlined in the GRISELDA Instructions.  (This includes situations where it may be a  new  usual care setting for the member. (i.e., a community member who decides upon permanent nursing home placement following hospitalization and rehab).    Discuss with Member/Responsible Party:    Check  Yes  - if the member, family member and/or SNF/facility staff manages the following:    If  No  provide explanation in the comments section.          Date completed: 11/29/23 Communicated with member's family, Jonathon, about the following: care transition process; about changes to the member s health status; plan of care updates; education about transitions and how to prevent unplanned transitions/readmissions    Four Pillars for Optimal Transition:    Check  Yes  - if the member, family member and/or SNF/facility staff manages the following:    If  No  provide explanation in the comments section.          [x]  Yes     []  No Does the member have a follow-up appointment scheduled with primary care or specialist? (Mental health hospitalizations--the appt. should be w/in 7 days)              For mental health hospitalizations:  []  Yes     []  No     Does the member have a follow-up appointment scheduled with a mental health practitioner within 7 days of discharge?  [x]  Yes     []  No     Has a medication review been completed with member? If no, refer to PCP, home care nurse, MTM, pharmacist  [x]  Yes     []  No     Can the member manage their medications or is there a system in place to manage medications (e.g. home care set-up)?         [x]  Yes     []  No     Can the member verbalize warning signs and symptoms to watch  for and how to respond?  [x]  Yes     []  No     Does the member have a copy of and understand their discharge instructions?  If no, assist to obtain copy of discharge instructions, review discharge instructions, and assist to contact PCP to discuss questions about their recent hospitalization.  [x]  Yes     []  No     Does the member have adequate food, housing and transportation?  If no, add goal and discuss additional supports available to the member                                                                                                                                                                                 [x]  Yes     []  No     Is the member safe in their home?  If no, document needs and support provided                                                                                                                                                                          []  Yes     [x]  No     Are there any concerns of vulnerability, abuse, or neglect?  If yes, document concerns and actions taken by Care Coordinator as a mandated                                                                                                                                                                              [x]  Yes     []  No     Does the member use a Personal Health Care Record?  Check  Yes  if visit summary, discharge summary, and/or healthcare summary are being used as a PHR.                                                                                                                                                                                  [x]  Yes     []  No     Have you reviewed the discharge summary with the member? If  No  provide explanation in comments.  [x]  Yes     []  No     Have you updated the member s care plan/support plan? Add new diagnosis, medications, treatments, goals & interventions, as applicable. If No, provide explanation in comments.    Comments:            Notes from conversation with the member/responsible party, provider, discharging and receiving facility (as applicable): see notes above from 11/29/23 conversations. Writer pended and routed home care order to Dr. Wegener, PCP, and asked if he is willing to sign it prior to seeing Wen on 12/5/23. Any further information about this will be documented in the encounters dated 11/28 & 11/29/23.

## 2023-11-22 NOTE — PATIENT INSTRUCTIONS
4:31- 4:44    Increase gabapentin to twice daily and one extra dose as needed working very well when neuropathy/pain flairs.      Reviewed labs.    Ok to defer cbc until September given risk of covid/refuses to wear mask outside     Will call 411 to see how to get handicap parking in front of house and let me know if paperwork needed from me.     Follow up 5-6 months.   
22-Nov-2023 00:11

## 2023-11-28 NOTE — PROGRESS NOTES
[facility] Care Coordination Contact    Member's family are sending her back to the hospital this evening. They are concerned about how weak she is since discharging from [facility] [place] on 11/22/23. They are also quite concerned about her pressure wounds.  Per Sandra, PCA/niece, Wen's sister is a nurse and she told them she needed to return to the hospital.    I was trying to help get home care set up, but no one knows how quickly an RN could get there and it is really difficult to transport Wen to the clinic, so the ED might be the best route. It is certainly not ideal but [facility] [place] did not do a WOC RN consult and did not order home care at discharge, despite multiple documented wounds.  Ifrah Jewell RN  [facility]   [phone]

## 2023-11-28 NOTE — PROGRESS NOTES
Spoke with Geronimo in Intake at [facility]. She said they could accept a home care referral for RN, PT & OT, but she would need a visit in the past 90 days, a face sheet, med list and med history.     Member was not seen by her PCP in the past 90 days, but she was hospitalized from 11/20 - 11/22/23, so writer called SERA Gallo care coordinator at [facility] who worked on member's discharge on 11/22/23. I told her about member having wounds and that I had recommended home care at discharge. She said she would see what she could do about getting an order.  Ifrah Jewell RN  [facility]   [phone]

## 2023-11-29 NOTE — PROGRESS NOTES
[facility] Care Coordination Contact    The patient was [place] [facility] [place] days; diagnosed with influenza A, pneumonia and sepsis. They documented that she had a pressure wound on her right ankle, a blister on her left heel, and pressure areas on sacrum and bilateral buttocks and family is very concerned about this.    I have told them to schedule an appt with Dr. Wegener (or another provider at the clinic) ASAP for hospital follow-up and home care orders. [facility] discharged her home without skilled nursing or any wound care orders.     Spoke with Rebecca at [facility] who said to fax a referral to them at: [phone] and they will determine if they are able to accommodate the patient.    Spoke with Toan, member's brother, and he told me that the EMTs advised she did not need to go to the ED last night. They told them to clean the wounds and they should be just fine.     Ifrah Jewell RN  [facility]   [phone]

## 2023-11-29 NOTE — TELEPHONE ENCOUNTER
Care Coordinator Note:    Home care can start within 30 days of the order (before or after a face to face visit).  Of course this is if they have available staff.    Dr. Wegener, Dolly is scheduled to see you on 12/5/23. Are you willing to sign a home care order with that as the date of the face to face?  I am concerned about her waiting until she sees you because it typically takes at least several days to get home care services started. Her brother and niece both said she is weaker than baseline too and is not even able to assist with transfers, etc.    Order started.    This care coordinator will help make sure [facility] receives the referral and if they can't accept the patient I can work on finding a different agency.    Thank you,  Ifrah Jewell RN  [facility] [place]   [phone]

## 2023-11-29 NOTE — TELEPHONE ENCOUNTER
Unfortunately and ironically this patient's insurance requires a face to face visit for home care services.      Please help schedule a hospital follow up visit .  Ok to use jonathan, virtual for in-person visit.     Joel Wegener,MD

## 2023-11-29 NOTE — TELEPHONE ENCOUNTER
Niece called stating the following:    Pt was in the hospital for a week. She came home with 2 bed sores. One of right ankle bone and the other on the heal of the left foot (this is a big blister).     Requesting home care nurse to come to the home to assess pt. Referral has been started. Thanks    Leatha Fam RN  [facility]

## 2023-11-30 NOTE — TELEPHONE ENCOUNTER
Thank you so much!  I am glad to know the ability for the visit to follow the order.     I signed the order, thank you for coordinating.     Joel Wegener,MD

## 2023-11-30 NOTE — TELEPHONE ENCOUNTER
AMY,  Patient's niece Sandra calling, C2C on file, but not signed by pt  States Johnson (pt's guardian) will be available later this afternoon  Best call back number for Kamilah   [phone]     Has upcoming appointment 12/5/23  Recently discharged from hospital  Believes developed pressure injury during hospitalization     Reports 1in x 1/3 in blister on L heel   And small R ankle bone blister     Inquiring if antibiotic recommended prior to OV?  Advised frequent repositioning, increased fluid and oral intake for wound healing     Sandra also inquiring if Vitamin Zircon Forte recommended?    Please advise    Thanks,  Alma Delia SCHULZ RN

## 2023-12-01 NOTE — PROGRESS NOTES
[facility] Care Coordination Contact    Dr. Wegener agreed with home care orders for wound care, etc. He signed the referral and it went to [facility]. They sent a message to PCP and this care coordinator that they cannot accept the referral due to capacity.  Writer faxed home care order to [facility] today.    Call [facility] and spoke with Jag in Intake. He looked at the order and said they accept the member's insurance, they can see people as long as there is a face to face within 30 days of order (scheduled for 12/5) and they have Epic access, so should be able to see everything needed.  Jag said they will verify everything, but he is pretty sure they could start seeing member early next week. They will give me a call to confirm or to let me know if they can't see the patient for any reason.    Texted Sandra & Toan to let them know about the above. They both responded to my message and confirmed that Wen is seeing Dr. Wegener on 12/5/23.    Routing to Dr. Wegener for his information.  Ifrah Jewell RN  [facility]   [phone]

## 2023-12-05 NOTE — COMMUNITY RESOURCES LIST (ENGLISH)
12/05/2023   [facility]  N/A  For questions about this resource list or additional care needs, please contact your primary care clinic or care manager.  Phone: [phone]   Email: N/A   Address: [address] [place] [id number]   Hours: N/A        Transportation       Free or low-cost transportation  1  [employer] Distance: 2.33 miles      In-Person   [address] [place] [address]  Language: English, Czech  Hours: Mon 9:00 AM - 5:00 PM , Tue 9:30 AM - 7:00 PM , Wed 9:00 AM - 5:00 PM , Thu 9:30 AM - 7:00 PM , Fri 9:00 AM - 5:00 PM  Fees: Free   Phone: (643) 720-5182 Email: contactus@[employer] Website: http://www.[employer]     2  [facility] Distance: 2.36 miles      In-Person   [address] [place] [address]  Language: English  Hours: Mon - Fri 8:00 AM - 3:00 PM  Fees: Free   Phone: (433) 624-5567 Ext.14 Email: neighborhood@[facility].[employer] Website: http://www.[facility].org     Transportation to medical appointments  3  [facility] Distance: 1.99 miles      In-Person   [address] [place] [address]  Language: English  Hours: Mon - Fri 9:00 AM - 4:00 PM  Fees: Free   Phone: (997) 571-1149 Email: [email or web address] [email or web address] Website: http://www.[email or web address] HospitalCarmell [email or web address]/     4  [facility] Transportation Programs - Non-Emergency Medical Transportation Distance: 4.94 miles      In-Person   [address] [facility] [address] [place] [address]  Language: English, Taiwanese, Czech  Hours: Mon - Fri 7:00 AM - 6:00 PM  Fees: Insurance   Phone: (741) 186-8186 Ext.4409 Email: jori@[employer].net Website: http://www.mtHydrobee.net/[place]/          Important Numbers & Websites       Emergency Services   911  City Services   311  Poison Control   (680) 338-3441  Suicide Prevention Lifeline   (472) 283-4058 (TALK)  Child Abuse Hotline   (984) 230-4340 (4-A-Child)  Sexual  Assault Hotline   (826) 105-1227 (HOPE)  National Runaway Safeline   (941) 108-2760 (RUNAWAY)  All-Options Talkline   (106) 482-3961  Substance Abuse Referral   (220) 687-1242 (HELP)

## 2023-12-05 NOTE — PATIENT INSTRUCTIONS
"Here with marilyn Flores.    Ulcer on the heel of both feet.  Left heel wound has not completely shed the dead skin on the surface of the blister.  Neither wound remarkable for erythema or warmth that would be consistent with infection.  Wound care recommendations: do not pull/cut off dead skin, allow to fall off on it's own; bandage the wound with vaseline/Abx ointment to assist in the healing process    Minor fall several months ago that has left nagging left shoulder discomfort.  No strength or neuro deficits noted on physical exam.  Has improved significantly since initial trauma.    Flu/Covid today    Discussed Shingles vaccine, 2 doses; unsure if eWn had chickenpox as a child    Right heal ulcer with scab: recommend applying vaseline or antibiotic ointment daily and cover with 4x4 non-adherent dressing and kerlix gauze wrap to keep on ankle then secured with tape on the guaze wrap.     Wash gently daily to remove softening/dead scab tissue.     Left heal wound:  clean gently with luke warm soapy water as you are doing daily.     Is doing great job at home keeping pressure of while sleeping with pillows. May also try to obtain heal \"booties\" to prevent pressure ulcer from medical supply ([facility])       "

## 2023-12-05 NOTE — PROGRESS NOTES
Assessment & Plan   ASSESSMENT AND PLAN  1. Pressure injury of right heel, stage 2 (H)  Wound care recommendations: do not pull/cut off dead skin, allow to fall off on it's own; bandage the wound with vaseline/Abx ointment to assist in the healing process  - Miscellaneous Order for DME - ONLY FOR DME    2. Pressure injury of left heel, stage 2 (H)    - Miscellaneous Order for DME - ONLY FOR DME    3. H/o stroke: with significant right sided weakness:   does not ambulate, needs assistance for transfers.  I attest for purposes of home care services (wound care, etc) that she is homebound.       Flu/Covid today    Discussed Shingles vaccine, 2 doses; unsure if Wen had chickenpox as a child    Wen is here today for her appointment with her niece Sandra.              45 minutes spent by me on the date of the encounter doing chart review, history and exam, documentation and further activities per the note     MED REC REQUIRED  Post Medication Reconciliation Status:  Discharge medications reconciled, continue medications without change      Joel Daniel Wegener, MD  [facility]   Wen is a 78 year old, presenting for the following health issues:  Hospital F/U ([facility] 11/20/23)      12/5/2023     2:32 PM   Additional Questions   Roomed by annetta huntley   Accompanied by sandra-niece       History of Present Illness       Reason for visit:  Hospital fu  Symptom onset:  3-7 days ago  Symptoms include:  Wounds  Symptom intensity:  Moderate  Symptom progression:  Staying the same  Had these symptoms before:  No  What makes it worse:  No  What makes it better:  No    She eats 2-3 servings of fruits and vegetables daily.She consumes 2 sweetened beverage(s) daily.She exercises with enough effort to increase her heart rate 20 to 29 minutes per day.  She exercises with enough effort to increase her heart rate 5 days per week.   She is taking medications regularly.           Hospital Follow-up  Visit:    Hospital/Nursing Home/IP Rehab Facility:  [facility]  Date of Admission: 11/20/23  Date of Discharge: 11/22/23  Reason(s) for Admission: Influenza    Was your hospitalization related to COVID-19? No   Problems taking medications regularly:  None  Medication changes since discharge: None  Problems adhering to non-medication therapy:  None    Summary of hospitalization:  See outside records, reviewed and scanned  Diagnostic Tests/Treatments reviewed.  Follow up needed: wound care/myself.   Other Healthcare Providers Involved in Patient s Care:          cliff  Update since discharge: improved.     Minor fall several months ago that has left nagging left shoulder discomfort.  No strength or neuro deficits noted on physical exam.  Has improved significantly since initial trauma. Hospitalized with influenze.  Has recovered from that though. Niece states left the hospital with the pressure sores.  She in the past has prevented these very well by positioning and care at home.     Plan of care communicated with patient and family                   Review of Systems         Objective    /82   Pulse 74   Temp 97.5  F (36.4  C) (Temporal)   Resp 16   Wt 74.8 kg (165 lb)   LMP  (LMP Unknown)   SpO2 100%   BMI 27.46 kg/m    Body mass index is 27.46 kg/m .  Physical Exam   Upon physical examination, an ulcer on the heel of both feet approximately 4 cm in diameter were observed.  Left heel wound has not completely shed the dead skin on the surface of the blister.  Neither wound remarkable for erythema or warmth that would be consistent with infection.

## 2023-12-05 NOTE — NURSING NOTE
RN assessed wounds on Pt's left heel and right ankle with provider, Dr. Wegener, present.   RN identified Pt by having Pt verbalize name and date of birth prior to cares.  Provider, Dr. Wegener, verbally ordered this RN to clean left heel with sterile water and leave open to air - RN completed per verbal order with read back. Provider, Dr. Wegener, verbally ordered this RN to administer bacitracin ointment to right ankle, cover with sterile all purpose sponge, and lightly wrap with cohesive bandage - RN completed per verbal order with read back and ensured cohesive bandage was lightly wrapped.   Pt tolerated cares.  - Ousmane SLAUGHTER RN

## 2023-12-07 NOTE — PROGRESS NOTES
[facility] Care Coordination Contact    Spoke with Sofia at [facility] and RN started home care episode on 12/3 and has another visit scheduled for 12/8/23.    Member's  is Reanna Onofre RN. Her cell number is [phone]. Updated POC with this information.  Reanna told this writer that Toan declined evaluation of Wen's buttocks. The [facility] hospital notes documented pressure areas on bilateral buttocks and sacrum.  Writer sent message to Brayden Flores, urging her to let the RN assess the member's bottom tomorrow. If there are wounds, she can treat them and help get equipment, cushions, etc to help them heal or to prevent them from becoming worse.     Ifrah Jewell RN  [facility]   [phone]

## 2023-12-07 NOTE — PROGRESS NOTES
[facility] Care Coordination Contact      [facility] Six-Month Telephone Assessment    6 month telephone assessment completed on 12/7/23.    ER visits: No  Hospitalizations: Yes -  [facility]  TCU stays: No  Significant health status changes: Member has influenza A and pneumonia and was hospitalized from 11/20 - 11/22/23. She was pretty weak when she went home and needed full assist to transfer. She is reportedly getting a little stronger now and home care is in place.   Member has wounds on her left heel and right ankle. The [facility] notes also mentioned pressure areas, but not stageable wounds on her coccyx and bilateral buttocks. Wound care will be done for at least the next 6 weeks, per RN case manager, Reanna.  Falls/Injuries: one fall a few months ago - shoulder discomfort since then.  ADL/IADL changes: Member was already dependent in all ADLs & IADLs, but she was weaker than baseline for the past couple weeks due to influenza and pneumonia.   Changes in services: Yes: Home Care nursing for wound care in place as of 12/3/23. Will likely bring in PT & OT for eval and treat.    Caregiver Assessment follow up:  Declines    Goals: See POC in chart for goal progress documentation.      Will see member in 6 months for an annual health risk assessment.   Encouraged member to call CC with any questions or concerns in the meantime.   Ifrah Jewell RN  [facility]   [phone]

## 2023-12-08 NOTE — TELEPHONE ENCOUNTER
Home care RN visits started 12/3 and patient is scheduled to be seen again tomorrow. She is being followed by [facility].  Ifrah Jewell RN  [facility]   [phone]

## 2023-12-08 NOTE — TELEPHONE ENCOUNTER
Reanna Carmen from [facility] care called to request the following verbal orders from JW:    skilled nursing 2x3 and 1x3  Wound care orders to left heel and left ankle cleanse with vashe and pat dry. Apply with medihoney and cover with foam border dressing.  Skilled nursing for wound care and management.    Best call back #: [phone].  Confidential vm.    Thanks!  Rebecca GRAY

## 2023-12-08 NOTE — TELEPHONE ENCOUNTER
JW,  Please see below and advise.  I do not see that we have confirmed following patient yet.  OK to give verbal ok?  Melinda CAMP RN

## 2023-12-12 NOTE — TELEPHONE ENCOUNTER
Reanna called back.  Able to confirm that provider is following patient based on home care referral.  Gave verbal ok for below orders.  Melinda CAMP RN

## 2023-12-19 NOTE — TELEPHONE ENCOUNTER
Forms/Letter Request    Type of form/letter:  home health cert and plan of care 12/3/2023-1/31/2023    Have you been seen for this request: N/A    Do we have the form/letter: Yes: order    Who is the form from? Home care    Where did/will the form come from? form was faxed in    When is form/letter needed by:  asap    How would you like the form/letter returned: Fax : [phone]    Patient Notified form requests are processed in 3-5 business days   yes    Okay to leave a detailed message?: No at Work number on file:  There is no work phone number on file. or Other phone number: [phone]

## 2023-12-20 NOTE — TELEPHONE ENCOUNTER
Received form(s) from [employer] for Prescribed Item per Wound- GZE NON IMP PAD SZ 16 subcutaneous IN OR    Placed form(s) in/on JW's box.    Forms need to be filled out and signed and faxed to number listed on form.. Also Mailed in Self Addressed Envelope From [employer] Copy for Scanning    Call pt to verify form was sent: No  Copy needs to be sent for scanning after completion: Yes

## 2023-12-21 NOTE — TELEPHONE ENCOUNTER
Forms faxed to [facility] fax # [phone] and copy sent to stat scan.     Rebecca GRAY

## 2023-12-26 NOTE — PROGRESS NOTES
[facility] Care Coordination Contact    Received voicemail from member Reanna's home care RN, asking for a return call on Friday, 12/22/23.  Left RN a voicemail asking for return call from her.  Ifrah Jewell RN  [facility]   [phone]

## 2024-01-01 ENCOUNTER — TELEPHONE (OUTPATIENT)
Dept: FAMILY MEDICINE | Facility: CLINIC | Age: 79
End: 2024-01-01
Payer: COMMERCIAL

## 2024-01-01 ENCOUNTER — PATIENT OUTREACH (OUTPATIENT)
Dept: GERIATRIC MEDICINE | Facility: CLINIC | Age: 79
End: 2024-01-01
Payer: COMMERCIAL

## 2024-01-01 ENCOUNTER — MEDICAL CORRESPONDENCE (OUTPATIENT)
Dept: HEALTH INFORMATION MANAGEMENT | Facility: CLINIC | Age: 79
End: 2024-01-01
Payer: COMMERCIAL

## 2024-01-01 ENCOUNTER — TELEPHONE (OUTPATIENT)
Dept: FAMILY MEDICINE | Facility: CLINIC | Age: 79
End: 2024-01-01

## 2024-01-01 ENCOUNTER — MEDICAL CORRESPONDENCE (OUTPATIENT)
Dept: FAMILY MEDICINE | Facility: CLINIC | Age: 79
End: 2024-01-01
Payer: COMMERCIAL

## 2024-01-01 ENCOUNTER — MEDICAL CORRESPONDENCE (OUTPATIENT)
Dept: HEALTH INFORMATION MANAGEMENT | Facility: CLINIC | Age: 79
End: 2024-01-01

## 2024-01-02 NOTE — TELEPHONE ENCOUNTER
Forms/Letter Request    Type of form/letter:  SN to increase to 2w4 and 1w1  for wound care recert     Have you been seen for this request: N/A    Do we have the form/letter: Yes: order    Who is the form from? Home care    Where did/will the form come from? form was faxed in    When is form/letter needed by: asap    How would you like the form/letter returned: Fax : [phone]    Patient Notified form requests are processed in 3-5 business days:Yes    Okay to leave a detailed message?: No at Other phone number:  [phone]   
  Home Care is calling regarding an established patient with  [employer] [facility].        12/28/2023    11:08 AM   Home Care Information   Date of Home Care episode start 12/19/2023   Current following provider Dr. Wegener   Date provider agreed to follow 12/19/2023    Name/Phone Number Burton Manrique  [phone]   Home Care agency [facility]     Requesting orders from: Wegener, Joel Daniel Irwin  Provider is following patient: Yes  Is this a 60-day recertification request?  No    Orders Requested    Skilled Nursing  Request for continuation of care with increase in frequency  Frequency:  2x a week for 6 weeks for wound care      Contact information confirmed and updated as needed.    GM GUILLERMO RN    
Faxed to [employer] [phone] & copy sent to scan.    Sofia GRAY  
Kaveh called back to follow-up on orders.  RN confirmed ok to do per provider.  Melinda CAMP RN   
Placed in [facility] TC basket. Joel Wegener, MD   
Placed in [facility] TC basket. Joel Wegener, MD   
Reanna Carmen from [facility] called to request the following verbal orders from DANETTE:    Skilled nursing 2x a week for 6 weeks for wound care.    Best call back #: [phone]    Thanks!  Rebecca GRAY    
Cognitively Impaired

## 2024-01-04 NOTE — PROGRESS NOTES
"[facility] Care Coordination Contact    Member's brother and legal guardian, Toan Sanchez, left voicemail asking for return call to discuss a $98.00 bill he got from [facility] agency.      Called Toan back and he told me about the bill and letter that accompanied it, which explained Wen's spend down and how they go by the household income to determine it, etc.    Toan said he did not think the member should have a spend down, as she is a \"[place]\". Toan said [facility] told him to call me and writer advised he needs to contact [facility]. Gave him the phone number ([phone]) and Toan voiced understanding and agreed with the plan.  Ifrah Jewell RN  [facility]   [phone]          "

## 2024-01-09 NOTE — TELEPHONE ENCOUNTER
Forms/Letter Request    Type of form/letter:  foam wnd pad sz 16 subcutaneous in or le,foam wnd pad sz 16 subcutaneous in or le,gze non imprstr pad sz 16      Have you been seen for this request: N/A    Do we have the form/letter: No    Who is the form from? [facility]  (if other please explain)    Where did/will the form come from? form was faxed in    When is form/letter needed by: asap    How would you like the form/letter returned: Fax : [phone]    Patient Notified form requests are processed in 3-5 business days:Yes    Okay to leave a detailed message?: Yes at Other phone number:  [phone]

## 2024-02-13 NOTE — TELEPHONE ENCOUNTER
Forms [facility] [facility] fax # [phone] and copy sent to stat scan.     Rebecca GRAY

## 2024-02-13 NOTE — TELEPHONE ENCOUNTER
Order/Referral Request    Who is requesting: [facility]    Orders being requested: Health Cert/Plan of Care    Reason service is needed/diagnosis:     When are orders needed by:     Has this been discussed with Provider: No    Does patient have a preference on a Group/Provider/Facility?     Does patient have an appointment scheduled?: No    Where to send orders: Fax [phone]    Okay to leave a detailed message?: No at Other phone number:  fax number above

## 2024-02-29 NOTE — TELEPHONE ENCOUNTER
Left voicemail for Burton Manrique to return call to [facility]  To verify if goals have been met for discharge  Or not?  Thanks,  Alma Delia SCHULZ RN

## 2024-02-29 NOTE — TELEPHONE ENCOUNTER
Reason for Call:  Home Health Care    Burton Manrique with [facility] called regarding (reason for call): Verbal Order    Orders are needed for this patient. Yes    PT: N/A    OT: N/A    Skilled Nursing: Discharge from Skilled Nursing    Pt Provider: Wegener    Phone Number Homecare Nurse can be reached at: [phone]    Can we leave a detailed message on this number? YES    Phone number patient can be reached at: Other phone number:  N/A*    Best Time: Today    Call taken on 2/29/2024 at 10:12 AM by Lena Barrera

## 2024-02-29 NOTE — TELEPHONE ENCOUNTER
JW,      See message below.  Spoke with home care nurse.    All goals have been met.  Wounds have healed.  Family requesting HC be discontinued.      Need your verbal ok.      Thanks,  Sofia Rebolledo RN

## 2024-03-01 NOTE — TELEPHONE ENCOUNTER
Forms/Letter Request    Type of form/letter: OTHER: physician orders  Order date: 3/1/24  Order # [id number]       Do we have the form/letter: Yes: in JW's office    Who is the form from? [facility] (if other please explain)    Where did/will the form come from? form was faxed in    When is form/letter needed by: asap    How would you like the form/letter returned: Fax : [phone]

## 2024-03-01 NOTE — PROGRESS NOTES
TRANSITIONS OF CARE (GRISELDA) LOG    GRISELDA tasks should be completed by the CC within one (1) business day of notification of each transition. Follow up contact with member is required after return to their usual care setting.  Note:  If CC finds out about the transitions fifteen (15) days or more after the member has returned to their usual care setting, no GRISELDA log is needed. However, the CC should check in with the member to discuss the transition process, any changes needed to the care plan and document it in a case note.     Member Name:  [facility] [id number]   Product: [facility] Care Coordinator Contact:  Ifrah Jewell RN Agency/County/Care System: [place]   Transition Communication Actions from Care Management Contact   Transition #1   Notification Date: 3/1/24 Transition Date: 2/29/24 Transition From: Home     Is this the member s usual care setting?               yes Transition To: Hospital, [facility]   Transition Type:  Unplanned    Documentation from conversation with the member/responsible party, provider, discharging and receiving facility:   Date: 3/1/24: Received notification of admission to hospital with dx of due to choking episode and hypoxia. A piece of chicken was removed from member's airway.   CC contacted Hospital /discharge planner, spoke with SERA Rg. Member had some improvements over night. Sedation weans but member has not woken up yet. No plans for transfer yet. Not assigned to a  yet. Does have a CC listed, Mykel Car, no number available.   CC reached out to  brother Johnson  regarding transition and offered support as needed. Provided updated per request.   Reviewed and update care plan as needed.  Notified community service providers and placed services PCA RN on hold as needed.  Transition log initiated.   PCP, Wegener, Joel Daniel Irwin, notified of hospitalization via EMR.  Doris Lantigua RN  [place]  "[facility]  [phone]  3/5/24: Called [facility] ([phone]) and was transferred to the Medical ICU. Left voicemail for DESIREE Bearden, asking for a return call. Ifrah Jewell RN [facility] [phone]  3/8/24: Per chart review, Wen had to be re-intubated this morning, due to acute respiratory distress. She was moved from med surg/ortho back to ICU this morning - after intubation.  has seen her and the team is going to move up a goals of care meeting with family, due to member being back on ventilator. Member had a palliative care consult on 3/7/24. See part of provider's note: \"Recommend on going evaluation by SLP for the potential to regain capacity for adequate oral intake. Recommend not offering a PEG in a patient with progressive cognitive and functional decline, TF would be burdensome and she would remain at high risk for aspiration and would not prolong life. Recommend family care conference next week with Johnson and patient's other siblings to further discuss plans moving forward following the swallow study. Would strongly recommend comfort focused care with plans and to eat for pleasure as patient very much enjoys food\".  Called member's brother and POA, Toan, and discussed the events of this morning. He is thinking about her code status and reports several people at the hospital have asked him what he wants to do, as member is currently full code. We talked through this a bit and it is even more difficult at this time because Wen Joya just lost another brother 2 weeks ago. Toan said he has big decisions to make and writer advised him to think of Wen's quality of life and how he and the family want the end of her life to be, whenever that may be. Offered ongoing support as needed and will continue to follow hospitalization in [facility]. Ifrah Jewell RN [facility] [phone]     Transition #2   Notification Date: 3/12/24 Transition Date:   3/11/24 Transition From: Hospital, [facility]     Is " this the member s usual care setting?               no Transition To:     Transition Type:  Member  3/11/24    Documentation from conversation with the member/responsible party, provider, discharging and receiving facility:   Date: 3/12/24: Per [facility] hospital chart, Wen was extubated yesterday afternoon and her code was changed to DNR/DNI. She  on 3/11/24 at 7:55 PM, with her family at the bedside. Spoke with member's brother, Toan, and expressed condolences and offered support. Gave him the phone number for [facility] Assistance ([phone]). Toan said he spoke with the [employer] and they are going to discuss payment details and walk him through the process. Toan did not have any further questions at this time. Advised he can call if they do have any questions or concerns. See Patient Outreach Encounter dated 3/12/24 for disenrollment. Ifrah Jewell RN [facility] [phone]    *RETURN TO USUAL CARE SETTING: NA- member is . Date of death 3/11/24.     Ifrah Jewell RN  [facility]   [phone]

## 2024-03-12 ENCOUNTER — PATIENT OUTREACH (OUTPATIENT)
Dept: GERIATRIC MEDICINE | Facility: CLINIC | Age: 79
End: 2024-03-12
Payer: COMMERCIAL

## 2024-03-12 NOTE — PROGRESS NOTES
[facility] Care Coordination Contact    Notified by report that member passed away on 3/11/24 at [facility].    PCP notified. Called all providers to cancel services: spoke with Yolanda at [facility] (PCA & Supervision). Spoke with Colleen at [facility], who said they discharged the member 3/1/24, after she was admitted to hospital. Emailed [facility] and [facility] confirmed they have cancelled any orders for the member. Spoke with Carmen at [facility] and they will reach out to the member's brother, Toan, to arrange a time to  Wen's wheelchair.    Called family member/caregiver, brother Toan, to offer condolences.   For [facility]: Death Notification form completed and emailed to [facility] at [email or web address]    Chart reviewed and this CC's encounter closed. Chart handed off to CMS to process disenrollment tasks.   Ifrah Jewell RN  [facility]   [phone]

## 2024-03-12 NOTE — Clinical Note
Hi Dr. Wegener,  I am not sure if you already heard, but Wen passed away at [facility] on 3/11. She was extubated that day, after talks between family, the medical ICU team and palliative care team. Her code status was changed to DNR/DNI, per family's wishes, and she  several hours later.  Thanks for all you do for your patients!  Ifrah Jewell, RN care coordinator  [facility]  [phone]

## 2024-03-14 ENCOUNTER — MEDICAL CORRESPONDENCE (OUTPATIENT)
Dept: HEALTH INFORMATION MANAGEMENT | Facility: CLINIC | Age: 79
End: 2024-03-14
Payer: COMMERCIAL

## 2024-03-14 ENCOUNTER — TELEPHONE (OUTPATIENT)
Dept: FAMILY MEDICINE | Facility: CLINIC | Age: 79
End: 2024-03-14
Payer: COMMERCIAL

## 2024-03-14 NOTE — TELEPHONE ENCOUNTER
Forms/Letter Request    Type of form/letter: OTHER: Orders from [facility]  Order #[id number]/29/24- Hospital Hold -Hold all Services Effective Transfer Date -2/29/24, Client Admitted to Inpatient Facility-[facility] With Diagnosis- Aspiration To Airway  Order #[id number]/12/24- Discharge All Services Effective Transfer Date 2/29/24, Client Admitted to Inpatient Facilility- [facility] With Diagnosis- Aspiration To Airway- Client Passed away While IP At [facility]       Do we have the form/letter: Yes    Who is the form from? Home care- [facility]    Where did/will the form come from? form was faxed in    When is form/letter needed by: asap    How would you like the form/letter returned: Fax : [phone]    Patient Notified form requests are processed in 5-7 business days:No    Okay to leave a detailed message?: No at Other phone number:  N/A

## 2025-05-22 NOTE — TELEPHONE ENCOUNTER
Faxed to [facility] [phone] & copy sent to scan.    Sofia GRAY  
Left message for Sandra to call back with number of gloves needed.    Sofia GRAY  
Please call suzan (her caregiver) and ask how many goves (vinyl glove order) are needed per month?  Then I can complete order.     Joel Wegener,MD   
Reason for Call:  Form, our goal is to have forms completed with 72 hours, however, some forms may require a visit or additional information.    Type of letter, form or note:  gloves vinyl pf medium  1qty     Who is the form from?: [employer]  (if other please explain)    Where did the form come from: form was faxed in    What clinic location was the form placed at?: [facility]    Where the form was placed: wegener Box/Folder    What number is listed as a contact on the form?:  Fax [phone]       Additional comments:     Call taken on 7/21/2021 at 11:57 AM by Min Chong      
Received message back from her home care nurse via staff message and uses 200 gloves/month.     Placed in [employer] basket. Joel Wegener, MD Joel Wegener,MD   
20

## 2025-06-03 NOTE — ADDENDUM NOTE
Addended by: LALITO KHANNA on: 11/29/2023 04:56 PM     Modules accepted: Orders     29-May-2025 13:31

## 2025-07-07 NOTE — TELEPHONE ENCOUNTER
Joel Wegener MD,  Please see phone message below    Current Rx:  levETIRAcetam (KEPPRA XR) 750 MG 24 hr tablet 135 tablet 3 9/3/2019  No   Sig: Takes 1/2 tablet daily     This medication should not be split in half per pharmacist    Please advise    Thank You!  Renate nIiguez, SERA  Triage Nurse     Transfer center paged for transfer to [facility].

## (undated) DEVICE — GLOVE PROTEXIS MICRO 6.0  2D73PM60

## (undated) DEVICE — GLOVE PROTEXIS MICRO 7.0  2D73PM70

## (undated) DEVICE — EYE CANN IRR 25GA CYSTOTOME 581610

## (undated) DEVICE — TAPE MICROPORE 1"X1.5YD 1530S-1

## (undated) DEVICE — EYE KNIFE SLIT XSTAR VISITEC 2.5MM 45DEG BEVEL UP 373725

## (undated) DEVICE — EYE TIP IRRIGATION & ASPIRATION POLYMER 35D BENT 8065751511

## (undated) DEVICE — EYE SHIELD PLASTIC

## (undated) DEVICE — EYE KNIFE STILETTO VISITEC 1.1MM ANG 45DEG SIDEPORT 376620

## (undated) DEVICE — TAPE MICROPORE 2"X1.5YD 1530S-2

## (undated) DEVICE — EYE NDL RETROBULBAR ATKINSON 25GA 1.5" 581637

## (undated) DEVICE — EYE CANN IRR 27GA ANTERIOR CHAMBER 581280

## (undated) DEVICE — PACK CATARACT CUSTOM ASC SEY15CPUMC

## (undated) DEVICE — EYE DRSG PAD OVAL

## (undated) DEVICE — LINEN TOWEL PACK X5 5464

## (undated) DEVICE — SOL WATER IRRIG 500ML BOTTLE 2F7113

## (undated) DEVICE — EYE RING MALYUGIN PUPIL EXPANDER 7.0MM MAL-000-2

## (undated) DEVICE — EYE PACK CUSTOM ANTERIOR 30DEG TIP CENTURION PPK6682-04

## (undated) RX ORDER — PROPOFOL 10 MG/ML
INJECTION, EMULSION INTRAVENOUS
Status: DISPENSED
Start: 2022-09-27

## (undated) RX ORDER — BEVACIZUMAB 1.25MG/.05
SYRINGE (ML) INTRAOCULAR
Status: DISPENSED
Start: 2022-09-27

## (undated) RX ORDER — FENTANYL CITRATE 50 UG/ML
INJECTION, SOLUTION INTRAMUSCULAR; INTRAVENOUS
Status: DISPENSED
Start: 2022-09-27

## (undated) RX ORDER — LIDOCAINE HYDROCHLORIDE 20 MG/ML
INJECTION, SOLUTION EPIDURAL; INFILTRATION; INTRACAUDAL; PERINEURAL
Status: DISPENSED
Start: 2022-09-27